# Patient Record
Sex: FEMALE | Race: WHITE | NOT HISPANIC OR LATINO
[De-identification: names, ages, dates, MRNs, and addresses within clinical notes are randomized per-mention and may not be internally consistent; named-entity substitution may affect disease eponyms.]

---

## 2017-01-20 ENCOUNTER — APPOINTMENT (OUTPATIENT)
Dept: INTERNAL MEDICINE | Facility: CLINIC | Age: 47
End: 2017-01-20

## 2017-01-20 ENCOUNTER — OUTPATIENT (OUTPATIENT)
Dept: OUTPATIENT SERVICES | Facility: HOSPITAL | Age: 47
LOS: 1 days | End: 2017-01-20
Payer: MEDICARE

## 2017-01-20 ENCOUNTER — RESULT CHARGE (OUTPATIENT)
Age: 47
End: 2017-01-20

## 2017-01-20 VITALS — HEART RATE: 62 BPM | DIASTOLIC BLOOD PRESSURE: 70 MMHG | SYSTOLIC BLOOD PRESSURE: 110 MMHG

## 2017-01-20 DIAGNOSIS — I10 ESSENTIAL (PRIMARY) HYPERTENSION: ICD-10-CM

## 2017-01-20 DIAGNOSIS — N39.0 URINARY TRACT INFECTION, SITE NOT SPECIFIED: ICD-10-CM

## 2017-01-20 PROCEDURE — G0463: CPT

## 2017-01-21 LAB
BILIRUB UR QL STRIP: NORMAL
CLARITY UR: CLEAR
COLLECTION METHOD: NORMAL
GLUCOSE UR-MCNC: NORMAL
HCG UR QL: 0.2 EU/DL
HGB UR QL STRIP.AUTO: NORMAL
KETONES UR-MCNC: NORMAL
LEUKOCYTE ESTERASE UR QL STRIP: NORMAL
NITRITE UR QL STRIP: NORMAL
PH UR STRIP: 5
PROT UR STRIP-MCNC: NORMAL
SP GR UR STRIP: 1.01

## 2017-01-22 LAB
APPEARANCE: CLEAR
BILIRUBIN URINE: NEGATIVE
BLOOD URINE: NEGATIVE
COLOR: YELLOW
GLUCOSE QUALITATIVE U: NORMAL MG/DL
KETONES URINE: NEGATIVE
LEUKOCYTE ESTERASE URINE: NEGATIVE
NITRITE URINE: NEGATIVE
PH URINE: 5.5
PROTEIN URINE: NEGATIVE MG/DL
SPECIFIC GRAVITY URINE: 1.02
UROBILINOGEN URINE: NORMAL MG/DL

## 2017-02-07 ENCOUNTER — APPOINTMENT (OUTPATIENT)
Dept: INFECTIOUS DISEASE | Facility: CLINIC | Age: 47
End: 2017-02-07

## 2017-02-07 VITALS
TEMPERATURE: 97 F | HEART RATE: 79 BPM | OXYGEN SATURATION: 98 % | DIASTOLIC BLOOD PRESSURE: 68 MMHG | HEIGHT: 59 IN | SYSTOLIC BLOOD PRESSURE: 103 MMHG

## 2017-03-28 ENCOUNTER — APPOINTMENT (OUTPATIENT)
Dept: ENDOCRINOLOGY | Facility: CLINIC | Age: 47
End: 2017-03-28

## 2017-03-28 VITALS
OXYGEN SATURATION: 99 % | DIASTOLIC BLOOD PRESSURE: 74 MMHG | SYSTOLIC BLOOD PRESSURE: 110 MMHG | HEIGHT: 59 IN | HEART RATE: 85 BPM

## 2017-03-31 ENCOUNTER — APPOINTMENT (OUTPATIENT)
Dept: INTERNAL MEDICINE | Facility: CLINIC | Age: 47
End: 2017-03-31

## 2017-03-31 LAB
25(OH)D3 SERPL-MCNC: 44 NG/ML
ALBUMIN SERPL ELPH-MCNC: 4.1 G/DL
ALP BLD-CCNC: 59 U/L
ALT SERPL-CCNC: 12 U/L
ANION GAP SERPL CALC-SCNC: 18 MMOL/L
AST SERPL-CCNC: 14 U/L
BASOPHILS # BLD AUTO: 0.03 K/UL
BASOPHILS NFR BLD AUTO: 0.5 %
BILIRUB SERPL-MCNC: 0.2 MG/DL
BUN SERPL-MCNC: 11 MG/DL
CALCIUM SERPL-MCNC: 9.3 MG/DL
CHLORIDE SERPL-SCNC: 101 MMOL/L
CHOLEST SERPL-MCNC: 215 MG/DL
CHOLEST/HDLC SERPL: 2.7 RATIO
CO2 SERPL-SCNC: 21 MMOL/L
CREAT SERPL-MCNC: 0.4 MG/DL
DHEA-S SERPL-MCNC: 77.9 UG/DL
EOSINOPHIL # BLD AUTO: 0.13 K/UL
EOSINOPHIL NFR BLD AUTO: 2.3 %
ESTRADIOL SERPL-MCNC: 76 PG/ML
FSH SERPL-MCNC: 8.5 IU/L
GLUCOSE SERPL-MCNC: 81 MG/DL
HCT VFR BLD CALC: 35.2 %
HDLC SERPL-MCNC: 79 MG/DL
HGB BLD-MCNC: 11.7 G/DL
IMM GRANULOCYTES NFR BLD AUTO: 0.2 %
LDLC SERPL CALC-MCNC: 126 MG/DL
LH SERPL-ACNC: 6.6 IU/L
LYMPHOCYTES # BLD AUTO: 1.83 K/UL
LYMPHOCYTES NFR BLD AUTO: 32.9 %
MAN DIFF?: NORMAL
MCHC RBC-ENTMCNC: 30 PG
MCHC RBC-ENTMCNC: 33.2 GM/DL
MCV RBC AUTO: 90.3 FL
MONOCYTES # BLD AUTO: 0.44 K/UL
MONOCYTES NFR BLD AUTO: 7.9 %
NEUTROPHILS # BLD AUTO: 3.13 K/UL
NEUTROPHILS NFR BLD AUTO: 56.2 %
PLATELET # BLD AUTO: 229 K/UL
POTASSIUM SERPL-SCNC: 4.2 MMOL/L
PROLACTIN SERPL-MCNC: 105 NG/ML
PROT SERPL-MCNC: 6.9 G/DL
RBC # BLD: 3.9 M/UL
RBC # FLD: 13.1 %
SODIUM SERPL-SCNC: 140 MMOL/L
T4 FREE SERPL-MCNC: 1.1 NG/DL
TESTOST SERPL-MCNC: 18.3 NG/DL
TRIGL SERPL-MCNC: 51 MG/DL
TSH SERPL-ACNC: 5.16 UIU/ML
WBC # FLD AUTO: 5.57 K/UL

## 2017-04-06 ENCOUNTER — APPOINTMENT (OUTPATIENT)
Dept: OTOLARYNGOLOGY | Facility: CLINIC | Age: 47
End: 2017-04-06

## 2017-04-26 ENCOUNTER — FORM ENCOUNTER (OUTPATIENT)
Age: 47
End: 2017-04-26

## 2017-04-27 ENCOUNTER — OUTPATIENT (OUTPATIENT)
Dept: OUTPATIENT SERVICES | Facility: HOSPITAL | Age: 47
LOS: 1 days | End: 2017-04-27
Payer: MEDICARE

## 2017-04-27 ENCOUNTER — APPOINTMENT (OUTPATIENT)
Dept: ULTRASOUND IMAGING | Facility: IMAGING CENTER | Age: 47
End: 2017-04-27

## 2017-04-27 ENCOUNTER — APPOINTMENT (OUTPATIENT)
Dept: MAMMOGRAPHY | Facility: IMAGING CENTER | Age: 47
End: 2017-04-27

## 2017-04-27 DIAGNOSIS — Z00.8 ENCOUNTER FOR OTHER GENERAL EXAMINATION: ICD-10-CM

## 2017-04-27 PROCEDURE — 77067 SCR MAMMO BI INCL CAD: CPT

## 2017-04-27 PROCEDURE — 76641 ULTRASOUND BREAST COMPLETE: CPT

## 2017-04-27 PROCEDURE — 77063 BREAST TOMOSYNTHESIS BI: CPT

## 2017-04-28 ENCOUNTER — APPOINTMENT (OUTPATIENT)
Dept: ULTRASOUND IMAGING | Facility: IMAGING CENTER | Age: 47
End: 2017-04-28

## 2017-04-28 ENCOUNTER — OUTPATIENT (OUTPATIENT)
Dept: OUTPATIENT SERVICES | Facility: HOSPITAL | Age: 47
LOS: 1 days | End: 2017-04-28
Payer: MEDICARE

## 2017-04-28 DIAGNOSIS — Z00.8 ENCOUNTER FOR OTHER GENERAL EXAMINATION: ICD-10-CM

## 2017-04-28 PROCEDURE — 76856 US EXAM PELVIC COMPLETE: CPT

## 2017-05-02 ENCOUNTER — APPOINTMENT (OUTPATIENT)
Dept: OBGYN | Facility: CLINIC | Age: 47
End: 2017-05-02

## 2017-05-09 ENCOUNTER — CLINICAL ADVICE (OUTPATIENT)
Age: 47
End: 2017-05-09

## 2017-05-11 ENCOUNTER — APPOINTMENT (OUTPATIENT)
Dept: OTOLARYNGOLOGY | Facility: CLINIC | Age: 47
End: 2017-05-11

## 2017-05-12 ENCOUNTER — OTHER (OUTPATIENT)
Age: 47
End: 2017-05-12

## 2017-05-12 ENCOUNTER — APPOINTMENT (OUTPATIENT)
Dept: SURGICAL ONCOLOGY | Facility: CLINIC | Age: 47
End: 2017-05-12

## 2017-05-12 VITALS
WEIGHT: 115 LBS | BODY MASS INDEX: 23.18 KG/M2 | HEART RATE: 87 BPM | DIASTOLIC BLOOD PRESSURE: 62 MMHG | RESPIRATION RATE: 15 BRPM | SYSTOLIC BLOOD PRESSURE: 96 MMHG | HEIGHT: 59 IN

## 2017-05-15 LAB
APPEARANCE: CLEAR
BACTERIA UR CULT: NORMAL
BACTERIA: NEGATIVE
BILIRUBIN URINE: NEGATIVE
BLOOD URINE: NEGATIVE
COLOR: YELLOW
GLUCOSE QUALITATIVE U: NORMAL MG/DL
HYALINE CASTS: 0 /LPF
KETONES URINE: NEGATIVE
LEUKOCYTE ESTERASE URINE: NEGATIVE
MICROSCOPIC-UA: NORMAL
NITRITE URINE: NEGATIVE
PH URINE: 6.5
PROTEIN URINE: NEGATIVE MG/DL
RED BLOOD CELLS URINE: 0 /HPF
SPECIFIC GRAVITY URINE: 1.01
SQUAMOUS EPITHELIAL CELLS: 0 /HPF
UROBILINOGEN URINE: NORMAL MG/DL
WHITE BLOOD CELLS URINE: 0 /HPF

## 2017-05-30 ENCOUNTER — APPOINTMENT (OUTPATIENT)
Dept: OPHTHALMOLOGY | Facility: CLINIC | Age: 47
End: 2017-05-30

## 2017-06-06 ENCOUNTER — APPOINTMENT (OUTPATIENT)
Dept: NEUROLOGY | Facility: CLINIC | Age: 47
End: 2017-06-06

## 2017-06-06 VITALS
SYSTOLIC BLOOD PRESSURE: 107 MMHG | HEIGHT: 59 IN | HEART RATE: 99 BPM | WEIGHT: 115 LBS | DIASTOLIC BLOOD PRESSURE: 74 MMHG | BODY MASS INDEX: 23.18 KG/M2

## 2017-06-09 ENCOUNTER — APPOINTMENT (OUTPATIENT)
Dept: INTERNAL MEDICINE | Facility: CLINIC | Age: 47
End: 2017-06-09

## 2017-06-16 ENCOUNTER — OTHER (OUTPATIENT)
Age: 47
End: 2017-06-16

## 2017-06-27 ENCOUNTER — APPOINTMENT (OUTPATIENT)
Dept: OTOLARYNGOLOGY | Facility: CLINIC | Age: 47
End: 2017-06-27

## 2017-06-27 VITALS
DIASTOLIC BLOOD PRESSURE: 68 MMHG | HEIGHT: 59 IN | BODY MASS INDEX: 23.18 KG/M2 | SYSTOLIC BLOOD PRESSURE: 103 MMHG | WEIGHT: 115 LBS | HEART RATE: 77 BPM

## 2017-07-14 ENCOUNTER — APPOINTMENT (OUTPATIENT)
Dept: INTERNAL MEDICINE | Facility: CLINIC | Age: 47
End: 2017-07-14

## 2017-09-01 ENCOUNTER — APPOINTMENT (OUTPATIENT)
Dept: INFECTIOUS DISEASE | Facility: CLINIC | Age: 47
End: 2017-09-01

## 2017-09-05 LAB
APPEARANCE: CLEAR
BACTERIA UR CULT: NORMAL
BACTERIA: NEGATIVE
BILIRUBIN URINE: NEGATIVE
BLOOD URINE: NEGATIVE
COLOR: YELLOW
GLUCOSE QUALITATIVE U: NORMAL MG/DL
KETONES URINE: NEGATIVE
LEUKOCYTE ESTERASE URINE: NEGATIVE
MICROSCOPIC-UA: NORMAL
NITRITE URINE: NEGATIVE
PH URINE: 6.5
PROTEIN URINE: NEGATIVE MG/DL
RED BLOOD CELLS URINE: 0 /HPF
SPECIFIC GRAVITY URINE: 1.01
SQUAMOUS EPITHELIAL CELLS: 0 /HPF
UROBILINOGEN URINE: NORMAL MG/DL
WHITE BLOOD CELLS URINE: 0 /HPF

## 2017-09-21 ENCOUNTER — APPOINTMENT (OUTPATIENT)
Dept: UROLOGY | Facility: CLINIC | Age: 47
End: 2017-09-21
Payer: MEDICARE

## 2017-09-21 PROCEDURE — 99213 OFFICE O/P EST LOW 20 MIN: CPT

## 2017-09-21 RX ORDER — CEFPODOXIME PROXETIL 100 MG/1
100 TABLET, FILM COATED ORAL
Qty: 56 | Refills: 0 | Status: COMPLETED | COMMUNITY
Start: 2017-08-08 | End: 2017-09-21

## 2017-09-22 ENCOUNTER — LABORATORY RESULT (OUTPATIENT)
Age: 47
End: 2017-09-22

## 2017-09-22 ENCOUNTER — APPOINTMENT (OUTPATIENT)
Dept: INTERNAL MEDICINE | Facility: CLINIC | Age: 47
End: 2017-09-22
Payer: MEDICARE

## 2017-09-22 ENCOUNTER — OUTPATIENT (OUTPATIENT)
Dept: OUTPATIENT SERVICES | Facility: HOSPITAL | Age: 47
LOS: 1 days | End: 2017-09-22
Payer: MEDICARE

## 2017-09-22 VITALS
HEIGHT: 59 IN | BODY MASS INDEX: 23.18 KG/M2 | WEIGHT: 115 LBS | SYSTOLIC BLOOD PRESSURE: 110 MMHG | DIASTOLIC BLOOD PRESSURE: 80 MMHG

## 2017-09-22 DIAGNOSIS — I10 ESSENTIAL (PRIMARY) HYPERTENSION: ICD-10-CM

## 2017-09-22 DIAGNOSIS — N31.9 NEUROMUSCULAR DYSFUNCTION OF BLADDER, UNSPECIFIED: ICD-10-CM

## 2017-09-22 DIAGNOSIS — G80.9 CEREBRAL PALSY, UNSPECIFIED: ICD-10-CM

## 2017-09-22 DIAGNOSIS — E03.9 HYPOTHYROIDISM, UNSPECIFIED: ICD-10-CM

## 2017-09-22 DIAGNOSIS — E22.1 HYPERPROLACTINEMIA: ICD-10-CM

## 2017-09-22 LAB
BILIRUB UR QL STRIP: NORMAL
CLARITY UR: CLEAR
COLLECTION METHOD: NORMAL
GLUCOSE UR-MCNC: NORMAL
HCG UR QL: 0.2 EU/DL
HGB UR QL STRIP.AUTO: NORMAL
KETONES UR-MCNC: NORMAL
LEUKOCYTE ESTERASE UR QL STRIP: NORMAL
NITRITE UR QL STRIP: NORMAL
PH UR STRIP: 5.5
PROT UR STRIP-MCNC: NORMAL
SP GR UR STRIP: 1.01

## 2017-09-22 PROCEDURE — 99213 OFFICE O/P EST LOW 20 MIN: CPT | Mod: GE

## 2017-09-22 PROCEDURE — G0463: CPT

## 2017-09-22 PROCEDURE — 85027 COMPLETE CBC AUTOMATED: CPT

## 2017-09-22 PROCEDURE — 84443 ASSAY THYROID STIM HORMONE: CPT

## 2017-09-22 PROCEDURE — 80053 COMPREHEN METABOLIC PANEL: CPT

## 2017-09-22 PROCEDURE — 84439 ASSAY OF FREE THYROXINE: CPT

## 2017-09-22 PROCEDURE — 84146 ASSAY OF PROLACTIN: CPT

## 2017-09-22 RX ORDER — OXICONAZOLE NITRATE 10 MG/ML
1 LOTION TOPICAL
Qty: 60 | Refills: 0 | Status: DISCONTINUED | COMMUNITY
Start: 2017-02-13 | End: 2017-09-22

## 2017-09-23 LAB
ALBUMIN SERPL ELPH-MCNC: 4.1 G/DL — SIGNIFICANT CHANGE UP (ref 3.3–5)
ALP SERPL-CCNC: 55 U/L — SIGNIFICANT CHANGE UP (ref 40–120)
ALT FLD-CCNC: 13 U/L — SIGNIFICANT CHANGE UP (ref 10–45)
ANION GAP SERPL CALC-SCNC: 14 MMOL/L — SIGNIFICANT CHANGE UP (ref 5–17)
AST SERPL-CCNC: 13 U/L — SIGNIFICANT CHANGE UP (ref 10–40)
BILIRUB SERPL-MCNC: 0.3 MG/DL — SIGNIFICANT CHANGE UP (ref 0.2–1.2)
BUN SERPL-MCNC: 13 MG/DL — SIGNIFICANT CHANGE UP (ref 7–23)
CALCIUM SERPL-MCNC: 9 MG/DL — SIGNIFICANT CHANGE UP (ref 8.4–10.5)
CHLORIDE SERPL-SCNC: 103 MMOL/L — SIGNIFICANT CHANGE UP (ref 96–108)
CO2 SERPL-SCNC: 23 MMOL/L — SIGNIFICANT CHANGE UP (ref 22–31)
CREAT SERPL-MCNC: 0.36 MG/DL — LOW (ref 0.5–1.3)
GLUCOSE SERPL-MCNC: 80 MG/DL — SIGNIFICANT CHANGE UP (ref 70–99)
HCT VFR BLD CALC: 34.7 % — SIGNIFICANT CHANGE UP (ref 34.5–45)
HGB BLD-MCNC: 11.2 G/DL — LOW (ref 11.5–15.5)
MCHC RBC-ENTMCNC: 29.6 PG — SIGNIFICANT CHANGE UP (ref 27–34)
MCHC RBC-ENTMCNC: 32.3 GM/DL — SIGNIFICANT CHANGE UP (ref 32–36)
MCV RBC AUTO: 91.8 FL — SIGNIFICANT CHANGE UP (ref 80–100)
PLATELET # BLD AUTO: 221 K/UL — SIGNIFICANT CHANGE UP (ref 150–400)
POTASSIUM SERPL-MCNC: 4.1 MMOL/L — SIGNIFICANT CHANGE UP (ref 3.5–5.3)
POTASSIUM SERPL-SCNC: 4.1 MMOL/L — SIGNIFICANT CHANGE UP (ref 3.5–5.3)
PROLACTIN SERPL-MCNC: 109.2 NG/ML — HIGH (ref 3.4–24.1)
PROT SERPL-MCNC: 7 G/DL — SIGNIFICANT CHANGE UP (ref 6–8.3)
RBC # BLD: 3.78 M/UL — LOW (ref 3.8–5.2)
RBC # FLD: 14.6 % — HIGH (ref 10.3–14.5)
SODIUM SERPL-SCNC: 140 MMOL/L — SIGNIFICANT CHANGE UP (ref 135–145)
T4 FREE SERPL-MCNC: 1.1 NG/DL — SIGNIFICANT CHANGE UP (ref 0.9–1.8)
TSH SERPL-MCNC: 3.55 UIU/ML — SIGNIFICANT CHANGE UP (ref 0.27–4.2)
WBC # BLD: 5.06 K/UL — SIGNIFICANT CHANGE UP (ref 3.8–10.5)
WBC # FLD AUTO: 5.06 K/UL — SIGNIFICANT CHANGE UP (ref 3.8–10.5)

## 2017-09-26 ENCOUNTER — APPOINTMENT (OUTPATIENT)
Dept: INFECTIOUS DISEASE | Facility: CLINIC | Age: 47
End: 2017-09-26
Payer: MEDICARE

## 2017-09-26 VITALS
HEIGHT: 55 IN | DIASTOLIC BLOOD PRESSURE: 68 MMHG | HEART RATE: 82 BPM | OXYGEN SATURATION: 98 % | SYSTOLIC BLOOD PRESSURE: 104 MMHG | TEMPERATURE: 99.5 F

## 2017-09-26 LAB — BACTERIA UR CULT: NORMAL

## 2017-09-26 PROCEDURE — 99213 OFFICE O/P EST LOW 20 MIN: CPT

## 2017-09-26 RX ORDER — NYSTATIN AND TRIAMCINOLONE ACETONIDE 100000; 1 MG/G; MG/G
100000-0.1 CREAM TOPICAL DAILY
Qty: 1 | Refills: 0 | Status: DISCONTINUED | COMMUNITY
Start: 2017-08-08 | End: 2017-09-26

## 2017-09-27 ENCOUNTER — APPOINTMENT (OUTPATIENT)
Dept: OBGYN | Facility: CLINIC | Age: 47
End: 2017-09-27

## 2017-09-29 ENCOUNTER — APPOINTMENT (OUTPATIENT)
Dept: ULTRASOUND IMAGING | Facility: IMAGING CENTER | Age: 47
End: 2017-09-29
Payer: MEDICARE

## 2017-10-03 ENCOUNTER — APPOINTMENT (OUTPATIENT)
Dept: ENDOCRINOLOGY | Facility: CLINIC | Age: 47
End: 2017-10-03

## 2017-10-11 ENCOUNTER — FORM ENCOUNTER (OUTPATIENT)
Age: 47
End: 2017-10-11

## 2017-10-12 ENCOUNTER — APPOINTMENT (OUTPATIENT)
Dept: ULTRASOUND IMAGING | Facility: IMAGING CENTER | Age: 47
End: 2017-10-12
Payer: MEDICARE

## 2017-10-12 ENCOUNTER — OUTPATIENT (OUTPATIENT)
Dept: OUTPATIENT SERVICES | Facility: HOSPITAL | Age: 47
LOS: 1 days | End: 2017-10-12
Payer: MEDICARE

## 2017-10-12 DIAGNOSIS — Z00.8 ENCOUNTER FOR OTHER GENERAL EXAMINATION: ICD-10-CM

## 2017-10-12 PROCEDURE — 76770 US EXAM ABDO BACK WALL COMP: CPT | Mod: 26

## 2017-10-12 PROCEDURE — 76770 US EXAM ABDO BACK WALL COMP: CPT

## 2017-10-17 ENCOUNTER — APPOINTMENT (OUTPATIENT)
Dept: PHYSICAL MEDICINE AND REHAB | Facility: CLINIC | Age: 47
End: 2017-10-17
Payer: MEDICARE

## 2017-10-17 PROCEDURE — 99213 OFFICE O/P EST LOW 20 MIN: CPT

## 2017-10-17 RX ORDER — CEFPODOXIME PROXETIL 100 MG/1
100 TABLET, FILM COATED ORAL
Qty: 56 | Refills: 0 | Status: DISCONTINUED | COMMUNITY
Start: 2017-09-26 | End: 2017-10-17

## 2017-10-18 ENCOUNTER — TRANSCRIPTION ENCOUNTER (OUTPATIENT)
Age: 47
End: 2017-10-18

## 2017-11-21 ENCOUNTER — APPOINTMENT (OUTPATIENT)
Dept: ENDOCRINOLOGY | Facility: CLINIC | Age: 47
End: 2017-11-21
Payer: MEDICARE

## 2017-11-21 VITALS — SYSTOLIC BLOOD PRESSURE: 100 MMHG | OXYGEN SATURATION: 98 % | HEART RATE: 84 BPM | DIASTOLIC BLOOD PRESSURE: 70 MMHG

## 2017-11-21 VITALS — BODY MASS INDEX: 21.17 KG/M2 | HEIGHT: 59 IN | WEIGHT: 105 LBS

## 2017-11-21 PROCEDURE — G0008: CPT

## 2017-11-21 PROCEDURE — 77080 DXA BONE DENSITY AXIAL: CPT | Mod: GA

## 2017-11-21 PROCEDURE — ZZZZZ: CPT

## 2017-11-21 PROCEDURE — 99214 OFFICE O/P EST MOD 30 MIN: CPT | Mod: 25

## 2017-11-21 PROCEDURE — 90686 IIV4 VACC NO PRSV 0.5 ML IM: CPT

## 2017-11-28 ENCOUNTER — FORM ENCOUNTER (OUTPATIENT)
Age: 47
End: 2017-11-28

## 2017-11-29 ENCOUNTER — APPOINTMENT (OUTPATIENT)
Dept: MRI IMAGING | Facility: IMAGING CENTER | Age: 47
End: 2017-11-29
Payer: MEDICARE

## 2017-11-29 ENCOUNTER — OUTPATIENT (OUTPATIENT)
Dept: OUTPATIENT SERVICES | Facility: HOSPITAL | Age: 47
LOS: 1 days | End: 2017-11-29
Payer: MEDICARE

## 2017-11-29 DIAGNOSIS — M47.12 OTHER SPONDYLOSIS WITH MYELOPATHY, CERVICAL REGION: ICD-10-CM

## 2017-11-29 DIAGNOSIS — M47.22 OTHER SPONDYLOSIS WITH RADICULOPATHY, CERVICAL REGION: ICD-10-CM

## 2017-11-29 PROCEDURE — 72141 MRI NECK SPINE W/O DYE: CPT | Mod: 26

## 2017-11-29 PROCEDURE — 72141 MRI NECK SPINE W/O DYE: CPT

## 2017-12-19 ENCOUNTER — APPOINTMENT (OUTPATIENT)
Dept: OBGYN | Facility: CLINIC | Age: 47
End: 2017-12-19

## 2017-12-21 ENCOUNTER — APPOINTMENT (OUTPATIENT)
Dept: OTOLARYNGOLOGY | Facility: CLINIC | Age: 47
End: 2017-12-21
Payer: MEDICARE

## 2017-12-21 VITALS
WEIGHT: 105 LBS | HEIGHT: 59 IN | HEART RATE: 71 BPM | SYSTOLIC BLOOD PRESSURE: 98 MMHG | DIASTOLIC BLOOD PRESSURE: 66 MMHG | BODY MASS INDEX: 21.17 KG/M2

## 2017-12-21 PROCEDURE — 99213 OFFICE O/P EST LOW 20 MIN: CPT | Mod: 25

## 2017-12-21 PROCEDURE — 69210 REMOVE IMPACTED EAR WAX UNI: CPT

## 2018-03-06 ENCOUNTER — OUTPATIENT (OUTPATIENT)
Dept: OUTPATIENT SERVICES | Facility: HOSPITAL | Age: 48
LOS: 1 days | End: 2018-03-06
Payer: MEDICARE

## 2018-03-06 ENCOUNTER — APPOINTMENT (OUTPATIENT)
Dept: ULTRASOUND IMAGING | Facility: IMAGING CENTER | Age: 48
End: 2018-03-06
Payer: MEDICARE

## 2018-03-06 DIAGNOSIS — N92.1 EXCESSIVE AND FREQUENT MENSTRUATION WITH IRREGULAR CYCLE: ICD-10-CM

## 2018-03-06 PROCEDURE — 76830 TRANSVAGINAL US NON-OB: CPT | Mod: 26

## 2018-03-06 PROCEDURE — 76856 US EXAM PELVIC COMPLETE: CPT | Mod: 26

## 2018-03-06 PROCEDURE — 76830 TRANSVAGINAL US NON-OB: CPT

## 2018-03-06 PROCEDURE — 76856 US EXAM PELVIC COMPLETE: CPT

## 2018-03-13 ENCOUNTER — RESULT REVIEW (OUTPATIENT)
Age: 48
End: 2018-03-13

## 2018-03-14 ENCOUNTER — APPOINTMENT (OUTPATIENT)
Dept: OBGYN | Facility: CLINIC | Age: 48
End: 2018-03-14
Payer: MEDICARE

## 2018-03-14 PROCEDURE — 36415 COLL VENOUS BLD VENIPUNCTURE: CPT

## 2018-03-14 PROCEDURE — G0101: CPT

## 2018-04-12 ENCOUNTER — APPOINTMENT (OUTPATIENT)
Dept: OTOLARYNGOLOGY | Facility: CLINIC | Age: 48
End: 2018-04-12
Payer: MEDICARE

## 2018-04-12 VITALS
HEART RATE: 91 BPM | SYSTOLIC BLOOD PRESSURE: 96 MMHG | WEIGHT: 105 LBS | BODY MASS INDEX: 21.17 KG/M2 | DIASTOLIC BLOOD PRESSURE: 62 MMHG | HEIGHT: 59 IN

## 2018-04-12 PROCEDURE — 99213 OFFICE O/P EST LOW 20 MIN: CPT | Mod: 25

## 2018-04-12 PROCEDURE — 69210 REMOVE IMPACTED EAR WAX UNI: CPT

## 2018-04-18 ENCOUNTER — APPOINTMENT (OUTPATIENT)
Dept: INFECTIOUS DISEASE | Facility: CLINIC | Age: 48
End: 2018-04-18
Payer: MEDICARE

## 2018-04-18 ENCOUNTER — LABORATORY RESULT (OUTPATIENT)
Age: 48
End: 2018-04-18

## 2018-04-18 VITALS
RESPIRATION RATE: 16 BRPM | WEIGHT: 100 LBS | TEMPERATURE: 98.1 F | DIASTOLIC BLOOD PRESSURE: 61 MMHG | SYSTOLIC BLOOD PRESSURE: 104 MMHG | OXYGEN SATURATION: 97 % | HEIGHT: 59 IN | HEART RATE: 75 BPM | BODY MASS INDEX: 20.16 KG/M2

## 2018-04-18 PROCEDURE — 99214 OFFICE O/P EST MOD 30 MIN: CPT

## 2018-04-19 ENCOUNTER — APPOINTMENT (OUTPATIENT)
Dept: UROLOGY | Facility: CLINIC | Age: 48
End: 2018-04-19
Payer: MEDICARE

## 2018-04-19 PROCEDURE — 99213 OFFICE O/P EST LOW 20 MIN: CPT

## 2018-04-24 ENCOUNTER — APPOINTMENT (OUTPATIENT)
Dept: ENDOCRINOLOGY | Facility: CLINIC | Age: 48
End: 2018-04-24
Payer: MEDICARE

## 2018-04-24 VITALS
WEIGHT: 100 LBS | HEART RATE: 83 BPM | OXYGEN SATURATION: 96 % | BODY MASS INDEX: 20.16 KG/M2 | HEIGHT: 59 IN | DIASTOLIC BLOOD PRESSURE: 70 MMHG | SYSTOLIC BLOOD PRESSURE: 110 MMHG

## 2018-04-24 DIAGNOSIS — L65.9 NONSCARRING HAIR LOSS, UNSPECIFIED: ICD-10-CM

## 2018-04-24 PROCEDURE — 99214 OFFICE O/P EST MOD 30 MIN: CPT

## 2018-04-25 LAB
25(OH)D3 SERPL-MCNC: 53.5 NG/ML
ALBUMIN SERPL ELPH-MCNC: 4 G/DL
ALP BLD-CCNC: 60 U/L
ALT SERPL-CCNC: 13 U/L
ANION GAP SERPL CALC-SCNC: 12 MMOL/L
AST SERPL-CCNC: 14 U/L
BASOPHILS # BLD AUTO: 0.03 K/UL
BASOPHILS NFR BLD AUTO: 0.6 %
BILIRUB SERPL-MCNC: 0.2 MG/DL
BUN SERPL-MCNC: 10 MG/DL
CALCIUM SERPL-MCNC: 9.2 MG/DL
CHLORIDE SERPL-SCNC: 100 MMOL/L
CHOLEST SERPL-MCNC: 190 MG/DL
CHOLEST/HDLC SERPL: 2.1 RATIO
CO2 SERPL-SCNC: 24 MMOL/L
CREAT SERPL-MCNC: 0.39 MG/DL
EOSINOPHIL # BLD AUTO: 0.13 K/UL
EOSINOPHIL NFR BLD AUTO: 2.4 %
ESTRADIOL SERPL-MCNC: 53 PG/ML
FERRITIN SERPL-MCNC: 18 NG/ML
FSH SERPL-MCNC: 21.8 IU/L
GLUCOSE SERPL-MCNC: 90 MG/DL
HCT VFR BLD CALC: 35.5 %
HDLC SERPL-MCNC: 89 MG/DL
HGB BLD-MCNC: 11.2 G/DL
IGF-1 INTERP: NORMAL
IGF-I BLD-MCNC: 166 NG/ML
IMM GRANULOCYTES NFR BLD AUTO: 0 %
IRON SATN MFR SERPL: 44 %
IRON SERPL-MCNC: 147 UG/DL
LDLC SERPL CALC-MCNC: 92 MG/DL
LH SERPL-ACNC: 11.3 IU/L
LYMPHOCYTES # BLD AUTO: 1.84 K/UL
LYMPHOCYTES NFR BLD AUTO: 34.7 %
MAN DIFF?: NORMAL
MCHC RBC-ENTMCNC: 30.3 PG
MCHC RBC-ENTMCNC: 31.5 GM/DL
MCV RBC AUTO: 95.9 FL
MONOCYTES # BLD AUTO: 0.45 K/UL
MONOCYTES NFR BLD AUTO: 8.5 %
NEUTROPHILS # BLD AUTO: 2.86 K/UL
NEUTROPHILS NFR BLD AUTO: 53.8 %
PLATELET # BLD AUTO: 273 K/UL
POTASSIUM SERPL-SCNC: 4.3 MMOL/L
PROLACTIN SERPL-MCNC: 93.2 NG/ML
PROT SERPL-MCNC: 6.8 G/DL
RBC # BLD: 3.7 M/UL
RBC # FLD: 13.4 %
SODIUM SERPL-SCNC: 136 MMOL/L
T4 FREE SERPL-MCNC: 1.2 NG/DL
TIBC SERPL-MCNC: 337 UG/DL
TRIGL SERPL-MCNC: 44 MG/DL
TSH SERPL-ACNC: 5.38 UIU/ML
UIBC SERPL-MCNC: 190 UG/DL
WBC # FLD AUTO: 5.31 K/UL

## 2018-05-07 ENCOUNTER — FORM ENCOUNTER (OUTPATIENT)
Age: 48
End: 2018-05-07

## 2018-05-08 ENCOUNTER — APPOINTMENT (OUTPATIENT)
Dept: ULTRASOUND IMAGING | Facility: IMAGING CENTER | Age: 48
End: 2018-05-08
Payer: MEDICARE

## 2018-05-08 ENCOUNTER — OUTPATIENT (OUTPATIENT)
Dept: OUTPATIENT SERVICES | Facility: HOSPITAL | Age: 48
LOS: 1 days | End: 2018-05-08
Payer: MEDICARE

## 2018-05-08 ENCOUNTER — APPOINTMENT (OUTPATIENT)
Dept: MAMMOGRAPHY | Facility: IMAGING CENTER | Age: 48
End: 2018-05-08
Payer: MEDICARE

## 2018-05-08 DIAGNOSIS — Z00.8 ENCOUNTER FOR OTHER GENERAL EXAMINATION: ICD-10-CM

## 2018-05-08 PROCEDURE — 77066 DX MAMMO INCL CAD BI: CPT

## 2018-05-08 PROCEDURE — 77063 BREAST TOMOSYNTHESIS BI: CPT | Mod: 26

## 2018-05-08 PROCEDURE — 77067 SCR MAMMO BI INCL CAD: CPT

## 2018-05-08 PROCEDURE — G0279: CPT

## 2018-05-08 PROCEDURE — 77067 SCR MAMMO BI INCL CAD: CPT | Mod: 26

## 2018-05-08 PROCEDURE — 76641 ULTRASOUND BREAST COMPLETE: CPT

## 2018-05-08 PROCEDURE — 77063 BREAST TOMOSYNTHESIS BI: CPT

## 2018-05-09 ENCOUNTER — APPOINTMENT (OUTPATIENT)
Dept: SURGICAL ONCOLOGY | Facility: CLINIC | Age: 48
End: 2018-05-09
Payer: MEDICARE

## 2018-05-09 VITALS
HEIGHT: 59 IN | RESPIRATION RATE: 16 BRPM | SYSTOLIC BLOOD PRESSURE: 104 MMHG | OXYGEN SATURATION: 95 % | HEART RATE: 64 BPM | DIASTOLIC BLOOD PRESSURE: 68 MMHG

## 2018-05-09 PROCEDURE — 99214 OFFICE O/P EST MOD 30 MIN: CPT

## 2018-05-09 PROCEDURE — 76641 ULTRASOUND BREAST COMPLETE: CPT | Mod: 26,50

## 2018-05-25 ENCOUNTER — OUTPATIENT (OUTPATIENT)
Dept: OUTPATIENT SERVICES | Facility: HOSPITAL | Age: 48
LOS: 1 days | End: 2018-05-25
Payer: MEDICARE

## 2018-05-25 ENCOUNTER — APPOINTMENT (OUTPATIENT)
Dept: ULTRASOUND IMAGING | Facility: IMAGING CENTER | Age: 48
End: 2018-05-25
Payer: MEDICARE

## 2018-05-25 DIAGNOSIS — Z00.8 ENCOUNTER FOR OTHER GENERAL EXAMINATION: ICD-10-CM

## 2018-05-25 PROCEDURE — 76856 US EXAM PELVIC COMPLETE: CPT | Mod: 26

## 2018-05-25 PROCEDURE — 76830 TRANSVAGINAL US NON-OB: CPT | Mod: 26

## 2018-05-25 PROCEDURE — 76830 TRANSVAGINAL US NON-OB: CPT

## 2018-05-25 PROCEDURE — 76856 US EXAM PELVIC COMPLETE: CPT

## 2018-05-29 ENCOUNTER — APPOINTMENT (OUTPATIENT)
Dept: OBGYN | Facility: CLINIC | Age: 48
End: 2018-05-29
Payer: MEDICARE

## 2018-05-29 PROCEDURE — 99213 OFFICE O/P EST LOW 20 MIN: CPT

## 2018-06-05 ENCOUNTER — APPOINTMENT (OUTPATIENT)
Dept: INFECTIOUS DISEASE | Facility: CLINIC | Age: 48
End: 2018-06-05

## 2018-06-12 LAB — BACTERIA UR CULT: NORMAL

## 2018-06-28 ENCOUNTER — APPOINTMENT (OUTPATIENT)
Dept: UROLOGY | Facility: CLINIC | Age: 48
End: 2018-06-28
Payer: MEDICARE

## 2018-06-28 VITALS
DIASTOLIC BLOOD PRESSURE: 64 MMHG | HEIGHT: 59 IN | SYSTOLIC BLOOD PRESSURE: 104 MMHG | WEIGHT: 105 LBS | BODY MASS INDEX: 21.17 KG/M2

## 2018-06-28 PROCEDURE — 99215 OFFICE O/P EST HI 40 MIN: CPT

## 2018-07-11 LAB
BASOPHILS # BLD AUTO: 0.04 K/UL
BASOPHILS NFR BLD AUTO: 0.9 %
EOSINOPHIL # BLD AUTO: 0.12 K/UL
EOSINOPHIL NFR BLD AUTO: 2.7 %
HCT VFR BLD CALC: 33.9 %
HGB BLD-MCNC: 10.8 G/DL
IMM GRANULOCYTES NFR BLD AUTO: 0 %
LYMPHOCYTES # BLD AUTO: 2.11 K/UL
LYMPHOCYTES NFR BLD AUTO: 48.1 %
MAN DIFF?: NORMAL
MCHC RBC-ENTMCNC: 29 PG
MCHC RBC-ENTMCNC: 31.9 GM/DL
MCV RBC AUTO: 91.1 FL
MONOCYTES # BLD AUTO: 0.43 K/UL
MONOCYTES NFR BLD AUTO: 9.8 %
NEUTROPHILS # BLD AUTO: 1.69 K/UL
NEUTROPHILS NFR BLD AUTO: 38.5 %
PLATELET # BLD AUTO: 237 K/UL
RBC # BLD: 3.72 M/UL
RBC # FLD: 13.5 %
WBC # FLD AUTO: 4.39 K/UL

## 2018-07-13 ENCOUNTER — APPOINTMENT (OUTPATIENT)
Dept: INTERNAL MEDICINE | Facility: CLINIC | Age: 48
End: 2018-07-13

## 2018-07-24 ENCOUNTER — OUTPATIENT (OUTPATIENT)
Dept: OUTPATIENT SERVICES | Facility: HOSPITAL | Age: 48
LOS: 1 days | End: 2018-07-24
Payer: MEDICARE

## 2018-07-24 ENCOUNTER — APPOINTMENT (OUTPATIENT)
Dept: INTERNAL MEDICINE | Facility: CLINIC | Age: 48
End: 2018-07-24
Payer: MEDICARE

## 2018-07-24 VITALS
HEART RATE: 80 BPM | TEMPERATURE: 99.2 F | HEIGHT: 59 IN | DIASTOLIC BLOOD PRESSURE: 60 MMHG | SYSTOLIC BLOOD PRESSURE: 100 MMHG

## 2018-07-24 DIAGNOSIS — M25.559 PAIN IN UNSPECIFIED HIP: ICD-10-CM

## 2018-07-24 DIAGNOSIS — I10 ESSENTIAL (PRIMARY) HYPERTENSION: ICD-10-CM

## 2018-07-24 DIAGNOSIS — R55 SYNCOPE AND COLLAPSE: ICD-10-CM

## 2018-07-24 DIAGNOSIS — Z91.018 ALLERGY TO OTHER FOODS: ICD-10-CM

## 2018-07-24 DIAGNOSIS — H10.13 ACUTE ATOPIC CONJUNCTIVITIS, BILATERAL: ICD-10-CM

## 2018-07-24 DIAGNOSIS — Z87.09 PERSONAL HISTORY OF OTHER DISEASES OF THE RESPIRATORY SYSTEM: ICD-10-CM

## 2018-07-24 DIAGNOSIS — H91.93 UNSPECIFIED HEARING LOSS, BILATERAL: ICD-10-CM

## 2018-07-24 DIAGNOSIS — Z11.3 ENCOUNTER FOR SCREENING FOR INFECTIONS WITH A PREDOMINANTLY SEXUAL MODE OF TRANSMISSION: ICD-10-CM

## 2018-07-24 DIAGNOSIS — T50.905A ADVERSE EFFECT OF UNSPECIFIED DRUGS, MEDICAMENTS AND BIOLOGICAL SUBSTANCES, INITIAL ENCOUNTER: ICD-10-CM

## 2018-07-24 DIAGNOSIS — Z87.81 PERSONAL HISTORY OF (HEALED) TRAUMATIC FRACTURE: ICD-10-CM

## 2018-07-24 DIAGNOSIS — M24.50 CONTRACTURE, UNSPECIFIED JOINT: ICD-10-CM

## 2018-07-24 DIAGNOSIS — M77.8 OTHER ENTHESOPATHIES, NOT ELSEWHERE CLASSIFIED: ICD-10-CM

## 2018-07-24 DIAGNOSIS — Z87.898 PERSONAL HISTORY OF OTHER SPECIFIED CONDITIONS: ICD-10-CM

## 2018-07-24 DIAGNOSIS — Z87.440 PERSONAL HISTORY OF URINARY (TRACT) INFECTIONS: ICD-10-CM

## 2018-07-24 DIAGNOSIS — N84.1 POLYP OF CERVIX UTERI: ICD-10-CM

## 2018-07-24 DIAGNOSIS — R07.0 PAIN IN THROAT: ICD-10-CM

## 2018-07-24 DIAGNOSIS — T78.1XXA OTHER ADVERSE FOOD REACTIONS, NOT ELSEWHERE CLASSIFIED, INITIAL ENCOUNTER: ICD-10-CM

## 2018-07-24 DIAGNOSIS — M25.569 PAIN IN UNSPECIFIED KNEE: ICD-10-CM

## 2018-07-24 PROCEDURE — 99213 OFFICE O/P EST LOW 20 MIN: CPT | Mod: GE

## 2018-07-24 PROCEDURE — G0463: CPT

## 2018-07-24 RX ORDER — FOLIC ACID 1 MG/1
1 TABLET ORAL DAILY
Refills: 0 | Status: ACTIVE | COMMUNITY
Start: 2018-07-24

## 2018-07-24 RX ORDER — METRONIDAZOLE 7.5 MG/G
0.75 GEL VAGINAL
Qty: 70 | Refills: 3 | Status: DISCONTINUED | COMMUNITY
Start: 2018-06-28 | End: 2018-07-24

## 2018-07-25 LAB
APPEARANCE: CLEAR
BILIRUBIN URINE: NEGATIVE
BLOOD URINE: NEGATIVE
COLOR: YELLOW
GLUCOSE QUALITATIVE U: NEGATIVE MG/DL
KETONES URINE: NEGATIVE
LEUKOCYTE ESTERASE URINE: NEGATIVE
NITRITE URINE: NEGATIVE
PH URINE: 6
PROTEIN URINE: NEGATIVE MG/DL
SPECIFIC GRAVITY URINE: 1.02
UROBILINOGEN URINE: NEGATIVE MG/DL

## 2018-07-25 NOTE — ASSESSMENT
[FreeTextEntry1] : 49 yo F with a PMH cerebral palsy, fibroids and resultant anemia, cervical neuralgia, chronic urinary incontinence (daily straight cath) with recurrent UTIs, vaginitis, and BV, hyperprolactinemia, and subclinical hypothyroidism who presents for f/u reported Hb drop.\par \par 1) Anemia- asymptomatic\par - Stable, likely 2/2 menstrual bleeding and spotting from myomata\par - Recently started on increased dose of iron QD, continue and plan for labs to monitor at next visit\par - No need for blood work today as asymptomatic\par - Discussed with pt if having sxs such as dizziness/lightheadedness to go to the ED\par - Discussed need to make GYN appt\par \par 2) Suprapubic TTP\par - Pt states when she has UTI's "It's really obvious" but in the setting of TTP, will check UA and Ucx. Pt is afebrile, without CVA TTP, and otherwise asymptomatic\par - Of note, pt has chronic incontinence with straight cath daily, and is on Keflex PPX\par - Of note, pt on Keflex chronically. No history of resistant organisms in past UTIs\par \par 3) Breast nodule, L breast\par - BiRADS 3\par - Due for repeat mammogram/US 6 months from last one, in November 2018.  \par - Followed by breast surgeon\par \par 4) Leiomyoma\par - Likely cause of bleeding\par - No thickened endometrium on US, no family hx of cancer, no weight loss in past 8 months\par - Hb relatively stable, will monitor as noted above\par \par 5) Vitamin D deficiency\par - Last level nml, will continue for now\par \par 6) Hyperprolactinemia\par - Follows up with endo\par - No galactorrhea\par \par 7) Hypothyroidism, subclinical\par - Pt's menorrhagia has reasonable alternative cause (myomata), and otherwise asymptomatic\par - Last free T4 wnl\par - Follows up with endo\par \par HCM\par - Mammogram/US as noted above\par - No bloodwork as noted above\par - UTD on tdap\par - CPE in 2 months\par \par D/w Dr. Fleming

## 2018-07-25 NOTE — REVIEW OF SYSTEMS
[Lower Ext Edema] : lower extremity edema [Fever] : no fever [Chills] : no chills [Fatigue] : no fatigue [Vision Problems] : no vision problems [Hearing Loss] : no hearing loss [Sore Throat] : no sore throat [Chest Pain] : no chest pain [Palpitations] : no palpitations [Shortness Of Breath] : no shortness of breath [Cough] : no cough [Abdominal Pain] : no abdominal pain [Constipation] : no constipation [Diarrhea] : diarrhea [Vomiting] : no vomiting [Melena] : no melena [Dysuria] : no dysuria [Hematuria] : no hematuria [Joint Pain] : no joint pain [Back Pain] : no back pain [Itching] : no itching [Skin Rash] : no skin rash [Headache] : no headache [Dizziness] : no dizziness [Anxiety] : no anxiety [Depression] : no depression [Easy Bleeding] : no easy bleeding [Easy Bruising] : no easy bruising [FreeTextEntry8] : menorrhagia and vaginal spotting

## 2018-07-25 NOTE — PHYSICAL EXAM
[No Acute Distress] : no acute distress [Well Nourished] : well nourished [Well-Appearing] : well-appearing [Normal Sclera/Conjunctiva] : normal sclera/conjunctiva [PERRL] : pupils equal round and reactive to light [EOMI] : extraocular movements intact [Normal Outer Ear/Nose] : the outer ears and nose were normal in appearance [Normal Oropharynx] : the oropharynx was normal [Supple] : supple [Thyroid Normal, No Nodules] : the thyroid was normal and there were no nodules present [No Respiratory Distress] : no respiratory distress  [Clear to Auscultation] : lungs were clear to auscultation bilaterally [Normal Rate] : normal rate  [Regular Rhythm] : with a regular rhythm [Normal S1, S2] : normal S1 and S2 [No Murmur] : no murmur heard [Soft] : abdomen soft [Non-distended] : non-distended [No Masses] : no abdominal mass palpated [No HSM] : no HSM [Normal Bowel Sounds] : normal bowel sounds [Normal Supraclavicular Nodes] : no supraclavicular lymphadenopathy [Normal Posterior Cervical Nodes] : no posterior cervical lymphadenopathy [Normal Anterior Cervical Nodes] : no anterior cervical lymphadenopathy [No CVA Tenderness] : no CVA  tenderness [No Spinal Tenderness] : no spinal tenderness [No Joint Swelling] : no joint swelling [No Rash] : no rash [No Skin Lesions] : no skin lesions [No Focal Deficits] : no focal deficits [Normal Affect] : the affect was normal [Alert and Oriented x3] : oriented to person, place, and time [Normal Mood] : the mood was normal [de-identified] : wheelchair bound [de-identified] : MM dry [de-identified] : 1+ pitting edema (baseline) [de-identified] : No rebound or guarding [de-identified] : suprapubic TTP with mild palpation, no rebound or guarding

## 2018-07-25 NOTE — HISTORY OF PRESENT ILLNESS
[FreeTextEntry8] : 49 yo F with a PMH cerebral palsy, fibroids and resultant anemia, cervical neuralgia, chronic urinary incontinence (daily straight cath) with recurrent UTIs, vaginitis, and BV, hyperprolactinemia, and subclinical hypothyroidism who presents for f/u Hb drop. She has been having vaginal spotting for most of her period month for the past 4-5 months and heavier periods for the past 3 months. Her heavy period days last about 3 days every month. \par She started 3 days ago an increased dose of iron. She had been on 27 mg iron chronically but was increased to 65 (325 total). She has not had spotting for the past week.\par \par She denies dizziness, CP, sob, palpitations, pain, burning with urination, fevers, or chills.\par \par Her gynecologist is Dr. Abdalla, whom she follows regularly. She does not have an appt scheduled yet.

## 2018-07-25 NOTE — DATA REVIEWED
[FreeTextEntry1] : [facility] HIE:\par May 2018\par - TV US: stable myomata in the setting of pt's known history of fibroids\par - Mammogram and Breast US: BiRADS-3, new L breast mass, benign appearing

## 2018-08-08 DIAGNOSIS — D64.9 ANEMIA, UNSPECIFIED: ICD-10-CM

## 2018-08-08 DIAGNOSIS — R32 UNSPECIFIED URINARY INCONTINENCE: ICD-10-CM

## 2018-08-08 DIAGNOSIS — E22.1 HYPERPROLACTINEMIA: ICD-10-CM

## 2018-08-08 DIAGNOSIS — E55.9 VITAMIN D DEFICIENCY, UNSPECIFIED: ICD-10-CM

## 2018-08-08 DIAGNOSIS — R10.819 ABDOMINAL TENDERNESS, UNSPECIFIED SITE: ICD-10-CM

## 2018-09-04 ENCOUNTER — APPOINTMENT (OUTPATIENT)
Dept: PHYSICAL MEDICINE AND REHAB | Facility: CLINIC | Age: 48
End: 2018-09-04
Payer: MEDICARE

## 2018-09-04 VITALS — DIASTOLIC BLOOD PRESSURE: 66 MMHG | SYSTOLIC BLOOD PRESSURE: 102 MMHG | OXYGEN SATURATION: 97 % | HEART RATE: 60 BPM

## 2018-09-04 PROCEDURE — 99213 OFFICE O/P EST LOW 20 MIN: CPT

## 2018-09-05 ENCOUNTER — APPOINTMENT (OUTPATIENT)
Dept: ORTHOPEDIC SURGERY | Facility: CLINIC | Age: 48
End: 2018-09-05
Payer: MEDICARE

## 2018-09-05 DIAGNOSIS — G89.29 PAIN IN RIGHT HIP: ICD-10-CM

## 2018-09-05 DIAGNOSIS — M24.851 OTHER SPECIFIC JOINT DERANGEMENTS OF RIGHT HIP, NOT ELSEWHERE CLASSIFIED: ICD-10-CM

## 2018-09-05 DIAGNOSIS — M25.551 PAIN IN RIGHT HIP: ICD-10-CM

## 2018-09-05 PROCEDURE — 73502 X-RAY EXAM HIP UNI 2-3 VIEWS: CPT | Mod: RT

## 2018-09-05 PROCEDURE — 99214 OFFICE O/P EST MOD 30 MIN: CPT

## 2018-09-06 ENCOUNTER — APPOINTMENT (OUTPATIENT)
Dept: OTOLARYNGOLOGY | Facility: CLINIC | Age: 48
End: 2018-09-06
Payer: MEDICARE

## 2018-09-26 ENCOUNTER — LABORATORY RESULT (OUTPATIENT)
Age: 48
End: 2018-09-26

## 2018-09-26 ENCOUNTER — APPOINTMENT (OUTPATIENT)
Dept: INTERNAL MEDICINE | Facility: CLINIC | Age: 48
End: 2018-09-26

## 2018-09-26 ENCOUNTER — OUTPATIENT (OUTPATIENT)
Dept: OUTPATIENT SERVICES | Facility: HOSPITAL | Age: 48
LOS: 1 days | End: 2018-09-26
Payer: MEDICARE

## 2018-09-26 PROCEDURE — 82746 ASSAY OF FOLIC ACID SERUM: CPT

## 2018-09-26 PROCEDURE — 82728 ASSAY OF FERRITIN: CPT

## 2018-09-26 PROCEDURE — 82607 VITAMIN B-12: CPT

## 2018-09-26 PROCEDURE — 83550 IRON BINDING TEST: CPT

## 2018-09-26 PROCEDURE — 85045 AUTOMATED RETICULOCYTE COUNT: CPT

## 2018-09-27 DIAGNOSIS — R32 UNSPECIFIED URINARY INCONTINENCE: ICD-10-CM

## 2018-09-27 DIAGNOSIS — D64.9 ANEMIA, UNSPECIFIED: ICD-10-CM

## 2018-09-27 DIAGNOSIS — R10.819 ABDOMINAL TENDERNESS, UNSPECIFIED SITE: ICD-10-CM

## 2018-09-27 DIAGNOSIS — E55.9 VITAMIN D DEFICIENCY, UNSPECIFIED: ICD-10-CM

## 2018-09-27 DIAGNOSIS — E22.1 HYPERPROLACTINEMIA: ICD-10-CM

## 2018-09-27 LAB
BASOPHILS # BLD AUTO: 0.02 K/UL — SIGNIFICANT CHANGE UP (ref 0–0.2)
BASOPHILS NFR BLD AUTO: 0.4 % — SIGNIFICANT CHANGE UP (ref 0–2)
EOSINOPHIL # BLD AUTO: 0.12 K/UL — SIGNIFICANT CHANGE UP (ref 0–0.5)
EOSINOPHIL NFR BLD AUTO: 2.2 % — SIGNIFICANT CHANGE UP (ref 0–6)
FERRITIN SERPL-MCNC: 39 NG/ML — SIGNIFICANT CHANGE UP (ref 15–150)
FOLATE SERPL-MCNC: 16.3 NG/ML — SIGNIFICANT CHANGE UP
HCT VFR BLD CALC: 36.5 % — SIGNIFICANT CHANGE UP (ref 34.5–45)
HGB BLD-MCNC: 11.5 G/DL — SIGNIFICANT CHANGE UP (ref 11.5–15.5)
IMM GRANULOCYTES NFR BLD AUTO: 0 % — SIGNIFICANT CHANGE UP (ref 0–1.5)
IRON SATN MFR SERPL: 206 UG/DL — HIGH (ref 30–160)
IRON SATN MFR SERPL: 61 % — HIGH (ref 14–50)
LYMPHOCYTES # BLD AUTO: 2.04 K/UL — SIGNIFICANT CHANGE UP (ref 1–3.3)
LYMPHOCYTES # BLD AUTO: 37 % — SIGNIFICANT CHANGE UP (ref 13–44)
MCHC RBC-ENTMCNC: 29.9 PG — SIGNIFICANT CHANGE UP (ref 27–34)
MCHC RBC-ENTMCNC: 31.5 GM/DL — LOW (ref 32–36)
MCV RBC AUTO: 95.1 FL — SIGNIFICANT CHANGE UP (ref 80–100)
MONOCYTES # BLD AUTO: 0.34 K/UL — SIGNIFICANT CHANGE UP (ref 0–0.9)
MONOCYTES NFR BLD AUTO: 6.2 % — SIGNIFICANT CHANGE UP (ref 2–14)
NEUTROPHILS # BLD AUTO: 2.99 K/UL — SIGNIFICANT CHANGE UP (ref 1.8–7.4)
NEUTROPHILS NFR BLD AUTO: 54.2 % — SIGNIFICANT CHANGE UP (ref 43–77)
PLATELET # BLD AUTO: 232 K/UL — SIGNIFICANT CHANGE UP (ref 150–400)
RBC # BLD: 3.84 M/UL — SIGNIFICANT CHANGE UP (ref 3.8–5.2)
RBC # BLD: 3.84 M/UL — SIGNIFICANT CHANGE UP (ref 3.8–5.2)
RBC # FLD: 13.8 % — SIGNIFICANT CHANGE UP (ref 10.3–14.5)
RETICS #: 60.7 K/UL — SIGNIFICANT CHANGE UP (ref 25–125)
RETICS/RBC NFR: 1.6 % — SIGNIFICANT CHANGE UP (ref 0.5–2.5)
TIBC SERPL-MCNC: 340 UG/DL — SIGNIFICANT CHANGE UP (ref 220–430)
UIBC SERPL-MCNC: 134 UG/DL — SIGNIFICANT CHANGE UP (ref 110–370)
VIT B12 SERPL-MCNC: >2000 PG/ML — HIGH (ref 232–1245)
WBC # BLD: 5.51 K/UL — SIGNIFICANT CHANGE UP (ref 3.8–10.5)
WBC # FLD AUTO: 5.51 K/UL — SIGNIFICANT CHANGE UP (ref 3.8–10.5)

## 2018-10-02 ENCOUNTER — FORM ENCOUNTER (OUTPATIENT)
Age: 48
End: 2018-10-02

## 2018-10-03 ENCOUNTER — APPOINTMENT (OUTPATIENT)
Dept: ULTRASOUND IMAGING | Facility: IMAGING CENTER | Age: 48
End: 2018-10-03
Payer: MEDICARE

## 2018-10-03 ENCOUNTER — OUTPATIENT (OUTPATIENT)
Dept: OUTPATIENT SERVICES | Facility: HOSPITAL | Age: 48
LOS: 1 days | End: 2018-10-03
Payer: MEDICARE

## 2018-10-03 DIAGNOSIS — N31.9 NEUROMUSCULAR DYSFUNCTION OF BLADDER, UNSPECIFIED: ICD-10-CM

## 2018-10-03 PROCEDURE — 76770 US EXAM ABDO BACK WALL COMP: CPT

## 2018-10-03 PROCEDURE — 76770 US EXAM ABDO BACK WALL COMP: CPT | Mod: 26

## 2018-10-18 ENCOUNTER — APPOINTMENT (OUTPATIENT)
Dept: OBGYN | Facility: CLINIC | Age: 48
End: 2018-10-18
Payer: MEDICARE

## 2018-10-18 PROCEDURE — 99213 OFFICE O/P EST LOW 20 MIN: CPT

## 2018-10-19 ENCOUNTER — APPOINTMENT (OUTPATIENT)
Dept: INTERNAL MEDICINE | Facility: CLINIC | Age: 48
End: 2018-10-19

## 2018-10-23 ENCOUNTER — APPOINTMENT (OUTPATIENT)
Dept: INFECTIOUS DISEASE | Facility: CLINIC | Age: 48
End: 2018-10-23
Payer: MEDICARE

## 2018-10-23 VITALS — SYSTOLIC BLOOD PRESSURE: 133 MMHG | DIASTOLIC BLOOD PRESSURE: 82 MMHG

## 2018-10-23 VITALS
HEIGHT: 59 IN | OXYGEN SATURATION: 97 % | TEMPERATURE: 98.1 F | WEIGHT: 105 LBS | HEART RATE: 95 BPM | DIASTOLIC BLOOD PRESSURE: 89 MMHG | BODY MASS INDEX: 21.17 KG/M2 | SYSTOLIC BLOOD PRESSURE: 139 MMHG

## 2018-10-23 PROCEDURE — 99214 OFFICE O/P EST MOD 30 MIN: CPT

## 2018-10-25 ENCOUNTER — APPOINTMENT (OUTPATIENT)
Dept: UROLOGY | Facility: CLINIC | Age: 48
End: 2018-10-25

## 2018-10-26 ENCOUNTER — APPOINTMENT (OUTPATIENT)
Dept: UROLOGY | Facility: CLINIC | Age: 48
End: 2018-10-26
Payer: MEDICARE

## 2018-10-26 ENCOUNTER — APPOINTMENT (OUTPATIENT)
Dept: OBGYN | Facility: CLINIC | Age: 48
End: 2018-10-26
Payer: MEDICARE

## 2018-10-26 PROCEDURE — 99213 OFFICE O/P EST LOW 20 MIN: CPT

## 2018-10-30 ENCOUNTER — APPOINTMENT (OUTPATIENT)
Dept: ENDOCRINOLOGY | Facility: CLINIC | Age: 48
End: 2018-10-30
Payer: MEDICARE

## 2018-10-30 VITALS
TEMPERATURE: 98.7 F | OXYGEN SATURATION: 98 % | HEART RATE: 80 BPM | SYSTOLIC BLOOD PRESSURE: 110 MMHG | DIASTOLIC BLOOD PRESSURE: 70 MMHG | HEIGHT: 59 IN

## 2018-10-30 PROCEDURE — G0008: CPT

## 2018-10-30 PROCEDURE — 99214 OFFICE O/P EST MOD 30 MIN: CPT | Mod: 25

## 2018-10-30 PROCEDURE — 90686 IIV4 VACC NO PRSV 0.5 ML IM: CPT

## 2018-11-01 ENCOUNTER — OUTPATIENT (OUTPATIENT)
Dept: OUTPATIENT SERVICES | Facility: HOSPITAL | Age: 48
LOS: 1 days | End: 2018-11-01
Payer: MEDICARE

## 2018-11-01 ENCOUNTER — APPOINTMENT (OUTPATIENT)
Dept: INTERNAL MEDICINE | Facility: CLINIC | Age: 48
End: 2018-11-01
Payer: MEDICARE

## 2018-11-01 VITALS
WEIGHT: 105 LBS | HEIGHT: 59 IN | BODY MASS INDEX: 21.17 KG/M2 | DIASTOLIC BLOOD PRESSURE: 70 MMHG | SYSTOLIC BLOOD PRESSURE: 110 MMHG

## 2018-11-01 DIAGNOSIS — Z87.2 PERSONAL HISTORY OF DISEASES OF THE SKIN AND SUBCUTANEOUS TISSUE: ICD-10-CM

## 2018-11-01 DIAGNOSIS — I10 ESSENTIAL (PRIMARY) HYPERTENSION: ICD-10-CM

## 2018-11-01 PROCEDURE — G0463: CPT

## 2018-11-01 PROCEDURE — 99213 OFFICE O/P EST LOW 20 MIN: CPT | Mod: GE

## 2018-11-02 LAB
ALBUMIN SERPL ELPH-MCNC: 4 G/DL
ALP BLD-CCNC: 75 U/L
ALT SERPL-CCNC: 27 U/L
ANION GAP SERPL CALC-SCNC: 12 MMOL/L
AST SERPL-CCNC: 15 U/L
BASOPHILS # BLD AUTO: 0.02 K/UL
BASOPHILS NFR BLD AUTO: 0.4 %
BILIRUB SERPL-MCNC: 0.2 MG/DL
BUN SERPL-MCNC: 11 MG/DL
CALCIUM SERPL-MCNC: 9.2 MG/DL
CHLORIDE SERPL-SCNC: 103 MMOL/L
CO2 SERPL-SCNC: 22 MMOL/L
CREAT SERPL-MCNC: 0.34 MG/DL
EOSINOPHIL # BLD AUTO: 0.11 K/UL
EOSINOPHIL NFR BLD AUTO: 2.2 %
ESTRADIOL SERPL-MCNC: 169 PG/ML
FSH SERPL-MCNC: 5.4 IU/L
GLUCOSE SERPL-MCNC: 80 MG/DL
HCT VFR BLD CALC: 33.6 %
HGB BLD-MCNC: 10.8 G/DL
IMM GRANULOCYTES NFR BLD AUTO: 0.4 %
LH SERPL-ACNC: 9.7 IU/L
LYMPHOCYTES # BLD AUTO: 1.74 K/UL
LYMPHOCYTES NFR BLD AUTO: 34.9 %
MAN DIFF?: NORMAL
MCHC RBC-ENTMCNC: 30 PG
MCHC RBC-ENTMCNC: 32.1 GM/DL
MCV RBC AUTO: 93.3 FL
MONOCYTES # BLD AUTO: 0.44 K/UL
MONOCYTES NFR BLD AUTO: 8.8 %
NEUTROPHILS # BLD AUTO: 2.66 K/UL
NEUTROPHILS NFR BLD AUTO: 53.3 %
PLATELET # BLD AUTO: 424 K/UL
POTASSIUM SERPL-SCNC: 4.3 MMOL/L
PROLACTIN SERPL-MCNC: 123.9 NG/ML
PROT SERPL-MCNC: 7.5 G/DL
RBC # BLD: 3.6 M/UL
RBC # FLD: 13 %
SODIUM SERPL-SCNC: 137 MMOL/L
T4 FREE SERPL-MCNC: 1.1 NG/DL
TSH SERPL-ACNC: 4.13 UIU/ML
WBC # FLD AUTO: 4.99 K/UL

## 2018-11-02 NOTE — ASSESSMENT
[FreeTextEntry1] : 47 yo F with a PMH cerebral palsy, uterine fibroids with menorrhagia, anemia, cervical neuralgia, chronic urinary incontinence (daily straight cath) with recurrent UTIs, vaginitis, and BV, hyperprolactinemia, and subclinical hypothyroidism presenting for CPE.\par \par #Anemia\par - Pt with Hgb drop from 12 to 11, then 10 within the past few months, coinciding with when she developed heavy menses; however, MCV has always remained in the 90s, RDW normal, with normal reticulocyte count\par - Iron levels low before starting ferrous sulfate, ferritin borderline low suggestive of iron deficiency anemia, however normal MCV and RDW may suggest another ongoing process\par - Vitamin B12 and folate levels WNL\par - Will refer pt to hematology for further evaluation\par \par #Bartholin cyst\par - Symptoms currently resolved\par - Pt will follow up with her gynecologist next week\par \par #Recurrent UTIs\par - C/w Keflex for chronic suppression\par - Pt to follow up with ID in 4 months\par \par #Hyperprolactinemia\par - Pt following up with endocrinology; prolactin levels remain elevated but relatively stable\par \par #HCM\par - UTD with mammogram in March; US scheduled for next week\par - Flu shot given on 10/30\par - UTD with Tdap\par \par RTC in 1 year for CPE

## 2018-11-02 NOTE — PHYSICAL EXAM
[No Acute Distress] : no acute distress [Well Nourished] : well nourished [Well Developed] : well developed [Well-Appearing] : well-appearing [Normal Sclera/Conjunctiva] : normal sclera/conjunctiva [PERRL] : pupils equal round and reactive to light [EOMI] : extraocular movements intact [Normal Outer Ear/Nose] : the outer ears and nose were normal in appearance [Normal Oropharynx] : the oropharynx was normal [No JVD] : no jugular venous distention [Supple] : supple [No Lymphadenopathy] : no lymphadenopathy [No Respiratory Distress] : no respiratory distress  [Clear to Auscultation] : lungs were clear to auscultation bilaterally [No Accessory Muscle Use] : no accessory muscle use [Normal Rate] : normal rate  [Regular Rhythm] : with a regular rhythm [Normal S1, S2] : normal S1 and S2 [No Murmur] : no murmur heard [No Edema] : there was no peripheral edema [Soft] : abdomen soft [Non Tender] : non-tender [Non-distended] : non-distended [Normal Bowel Sounds] : normal bowel sounds [Normal Posterior Cervical Nodes] : no posterior cervical lymphadenopathy [Normal Anterior Cervical Nodes] : no anterior cervical lymphadenopathy [No Joint Swelling] : no joint swelling [Grossly Normal Strength/Tone] : grossly normal strength/tone [No Rash] : no rash [No Skin Lesions] : no skin lesions [Normal Affect] : the affect was normal [Normal Insight/Judgement] : insight and judgment were intact [de-identified] : Pt sitting in wheelchair [de-identified] : Cerumen in left canal, impacted cerumen in right canal [de-identified] : Contracted hands b/l [de-identified] : Pt able to move upper extremities, decreased strength in b/l lower extremities

## 2018-11-02 NOTE — REVIEW OF SYSTEMS
[Lower Ext Edema] : lower extremity edema [Shortness Of Breath] : shortness of breath [Nausea] : nausea [Incontinence] : incontinence [Dizziness] : dizziness [Fever] : no fever [Chills] : no chills [Fatigue] : no fatigue [Discharge] : no discharge [Itching] : no itching [Hearing Loss] : no hearing loss [Sore Throat] : no sore throat [Chest Pain] : no chest pain [Palpitations] : no palpitations [Cough] : no cough [Abdominal Pain] : no abdominal pain [Constipation] : no constipation [Diarrhea] : diarrhea [Vomiting] : no vomiting [Dysuria] : no dysuria [Hematuria] : no hematuria [Joint Pain] : no joint pain [Muscle Pain] : no muscle pain [Skin Rash] : no skin rash [Depression] : no depression

## 2018-11-02 NOTE — HISTORY OF PRESENT ILLNESS
[FreeTextEntry1] : CPE [de-identified] : 47 yo F with a PMH cerebral palsy, uterine fibroids with menorrhagia, anemia, cervical neuralgia, chronic urinary incontinence (daily straight cath) with recurrent UTIs, vaginitis, and BV, hyperprolactinemia, and subclinical hypothyroidism presenting for CPE.\bryce [place] Pt feels well at today's visit. 2 weeks ago, however, she had a right-sided Bartholin abscess which was very painful. She had fevers at home and saw her gynecologist, who prescribed her cefpodoxime, which she took for 7 days. She states that the abscess drained on its own at home and she no longer has any symptoms. She continues to straight cath daily due to urinary retention. \bryce [place] Pt reports her last LMP was 2 weeks ago, and she is still having heavy periods. She goes through multiple pads daily during her period. She feels dizzy and SOB at times. She continues to take iron supplements as prescribed. \bryce [place] Pt lives at home with a home attendant for 12 hours during the day every day of the week. She is wheelchair bound and needs assistance with ADLs. She denies any history of smoking, alcohol use or illicit drug use.

## 2018-11-06 DIAGNOSIS — E22.1 HYPERPROLACTINEMIA: ICD-10-CM

## 2018-11-06 DIAGNOSIS — D64.9 ANEMIA, UNSPECIFIED: ICD-10-CM

## 2018-11-06 DIAGNOSIS — N39.0 URINARY TRACT INFECTION, SITE NOT SPECIFIED: ICD-10-CM

## 2018-11-06 DIAGNOSIS — E03.9 HYPOTHYROIDISM, UNSPECIFIED: ICD-10-CM

## 2018-11-07 ENCOUNTER — FORM ENCOUNTER (OUTPATIENT)
Age: 48
End: 2018-11-07

## 2018-11-08 ENCOUNTER — OUTPATIENT (OUTPATIENT)
Dept: OUTPATIENT SERVICES | Facility: HOSPITAL | Age: 48
LOS: 1 days | End: 2018-11-08
Payer: MEDICARE

## 2018-11-08 ENCOUNTER — APPOINTMENT (OUTPATIENT)
Dept: ULTRASOUND IMAGING | Facility: IMAGING CENTER | Age: 48
End: 2018-11-08
Payer: MEDICARE

## 2018-11-08 DIAGNOSIS — N63.0 UNSPECIFIED LUMP IN UNSPECIFIED BREAST: ICD-10-CM

## 2018-11-08 PROCEDURE — 76642 ULTRASOUND BREAST LIMITED: CPT

## 2018-11-08 PROCEDURE — 76642 ULTRASOUND BREAST LIMITED: CPT | Mod: 26,LT

## 2018-11-09 ENCOUNTER — APPOINTMENT (OUTPATIENT)
Dept: OBGYN | Facility: CLINIC | Age: 48
End: 2018-11-09
Payer: MEDICARE

## 2018-11-09 PROCEDURE — 99213 OFFICE O/P EST LOW 20 MIN: CPT

## 2018-11-13 ENCOUNTER — RX RENEWAL (OUTPATIENT)
Age: 48
End: 2018-11-13

## 2018-11-15 ENCOUNTER — APPOINTMENT (OUTPATIENT)
Dept: OTOLARYNGOLOGY | Facility: CLINIC | Age: 48
End: 2018-11-15
Payer: MEDICARE

## 2018-11-15 VITALS — BODY MASS INDEX: 21.17 KG/M2 | HEIGHT: 59 IN | WEIGHT: 105 LBS

## 2018-11-15 PROCEDURE — 69210 REMOVE IMPACTED EAR WAX UNI: CPT

## 2018-11-15 PROCEDURE — 99213 OFFICE O/P EST LOW 20 MIN: CPT | Mod: 25

## 2018-11-20 ENCOUNTER — APPOINTMENT (OUTPATIENT)
Dept: OPHTHALMOLOGY | Facility: CLINIC | Age: 48
End: 2018-11-20
Payer: MEDICARE

## 2018-11-20 DIAGNOSIS — H31.091 OTHER CHORIORETINAL SCARS, RIGHT EYE: ICD-10-CM

## 2018-11-20 PROCEDURE — 92014 COMPRE OPH EXAM EST PT 1/>: CPT

## 2018-11-23 ENCOUNTER — APPOINTMENT (OUTPATIENT)
Dept: UROLOGY | Facility: CLINIC | Age: 48
End: 2018-11-23

## 2018-11-30 ENCOUNTER — APPOINTMENT (OUTPATIENT)
Dept: HEMATOLOGY ONCOLOGY | Facility: CLINIC | Age: 48
End: 2018-11-30

## 2018-12-03 ENCOUNTER — OUTPATIENT (OUTPATIENT)
Dept: OUTPATIENT SERVICES | Facility: HOSPITAL | Age: 48
LOS: 1 days | Discharge: HOME | End: 2018-12-03

## 2018-12-03 DIAGNOSIS — D64.9 ANEMIA, UNSPECIFIED: ICD-10-CM

## 2018-12-04 ENCOUNTER — APPOINTMENT (OUTPATIENT)
Dept: HEMATOLOGY ONCOLOGY | Facility: CLINIC | Age: 48
End: 2018-12-04
Payer: MEDICARE

## 2018-12-04 ENCOUNTER — RESULT REVIEW (OUTPATIENT)
Age: 48
End: 2018-12-04

## 2018-12-04 VITALS
SYSTOLIC BLOOD PRESSURE: 107 MMHG | RESPIRATION RATE: 16 BRPM | HEART RATE: 75 BPM | DIASTOLIC BLOOD PRESSURE: 72 MMHG | TEMPERATURE: 98.6 F | OXYGEN SATURATION: 99 %

## 2018-12-04 LAB
BASOPHILS # BLD AUTO: 0 K/UL — SIGNIFICANT CHANGE UP (ref 0–0.2)
BASOPHILS NFR BLD AUTO: 0.7 % — SIGNIFICANT CHANGE UP (ref 0–2)
EOSINOPHIL # BLD AUTO: 0.1 K/UL — SIGNIFICANT CHANGE UP (ref 0–0.5)
EOSINOPHIL NFR BLD AUTO: 1.5 % — SIGNIFICANT CHANGE UP (ref 0–6)
HCT VFR BLD CALC: 35.2 % — SIGNIFICANT CHANGE UP (ref 34.5–45)
HGB BLD-MCNC: 11.9 G/DL — SIGNIFICANT CHANGE UP (ref 11.5–15.5)
LYMPHOCYTES # BLD AUTO: 1.9 K/UL — SIGNIFICANT CHANGE UP (ref 1–3.3)
LYMPHOCYTES # BLD AUTO: 29.9 % — SIGNIFICANT CHANGE UP (ref 13–44)
MCHC RBC-ENTMCNC: 30.9 PG — SIGNIFICANT CHANGE UP (ref 27–34)
MCHC RBC-ENTMCNC: 33.8 G/DL — SIGNIFICANT CHANGE UP (ref 32–36)
MCV RBC AUTO: 91.3 FL — SIGNIFICANT CHANGE UP (ref 80–100)
MONOCYTES # BLD AUTO: 0.5 K/UL — SIGNIFICANT CHANGE UP (ref 0–0.9)
MONOCYTES NFR BLD AUTO: 8.2 % — SIGNIFICANT CHANGE UP (ref 2–14)
NEUTROPHILS # BLD AUTO: 3.8 K/UL — SIGNIFICANT CHANGE UP (ref 1.8–7.4)
NEUTROPHILS NFR BLD AUTO: 59.7 % — SIGNIFICANT CHANGE UP (ref 43–77)
PLATELET # BLD AUTO: 224 K/UL — SIGNIFICANT CHANGE UP (ref 150–400)
RBC # BLD: 3.86 M/UL — SIGNIFICANT CHANGE UP (ref 3.8–5.2)
RBC # FLD: 12.1 % — SIGNIFICANT CHANGE UP (ref 10.3–14.5)
WBC # BLD: 6.4 K/UL — SIGNIFICANT CHANGE UP (ref 3.8–10.5)
WBC # FLD AUTO: 6.4 K/UL — SIGNIFICANT CHANGE UP (ref 3.8–10.5)

## 2018-12-04 PROCEDURE — 99204 OFFICE O/P NEW MOD 45 MIN: CPT

## 2018-12-06 NOTE — PHYSICAL EXAM
[Normal] : affect appropriate [de-identified] : in wheelchair [de-identified] : contracted hands b/l; decreased strength b/l LE

## 2018-12-06 NOTE — ASSESSMENT
[FreeTextEntry1] : 47yo F w/ cerebral palsy, chronic urinary incontinence (daily straight cath) with recurrent UTIs, vaginitis, hyperprolactinemia, and subclinical hypothyroidism here for evaluation of anemia.\par Her Hgb today is wnl - 11.9. Will continue her current vitamins. Cont monitoring her CBC every 4 months. \par f/u w/ PMD, gyn and endo as scheduled.

## 2018-12-06 NOTE — HISTORY OF PRESENT ILLNESS
[de-identified] : 47yo F w/ cerebral palsy, chronic urinary incontinence (daily straight cath) with recurrent UTIs, vaginitis, hyperprolactinemia, and subclinical hypothyroidism here for evaluation of anemia.\par Her Hgb has ranged from 10.8-11.2 over the last year. Her MCV is normal with normal WBC and plt count. Iron studies, B12, folate unremarkable in Sept. Retic count wnl. CMP showed a low Cr, but otherwise normal. \par Had recent UTI s/p course of Abx. She is on Keflex for PPx. No other medications. She is taking multiple vitamins including iron 325mg once daily (since July 2018), vit C 1000, B12, folic acid, D3 5000, and probiotic. \par \par She has very heavy menses for 2 days, gotten heavier since April. She has known fibroids. LMP 11/14/18, lasts 2 weeks.

## 2019-01-03 ENCOUNTER — CLINICAL ADVICE (OUTPATIENT)
Age: 49
End: 2019-01-03

## 2019-02-12 ENCOUNTER — APPOINTMENT (OUTPATIENT)
Dept: INTERNAL MEDICINE | Facility: CLINIC | Age: 49
End: 2019-02-12

## 2019-02-12 VITALS — SYSTOLIC BLOOD PRESSURE: 120 MMHG | DIASTOLIC BLOOD PRESSURE: 60 MMHG

## 2019-02-12 DIAGNOSIS — B35.1 TINEA UNGUIUM: ICD-10-CM

## 2019-02-13 NOTE — PHYSICAL EXAM
[No Acute Distress] : no acute distress [Well Nourished] : well nourished [Well Developed] : well developed [Normal Sclera/Conjunctiva] : normal sclera/conjunctiva [PERRL] : pupils equal round and reactive to light [Normal Outer Ear/Nose] : the outer ears and nose were normal in appearance [Normal Oropharynx] : the oropharynx was normal [No JVD] : no jugular venous distention [Supple] : supple [No Respiratory Distress] : no respiratory distress  [Clear to Auscultation] : lungs were clear to auscultation bilaterally [No Accessory Muscle Use] : no accessory muscle use [Normal Rate] : normal rate  [Regular Rhythm] : with a regular rhythm [Normal S1, S2] : normal S1 and S2 [No Murmur] : no murmur heard [No Edema] : there was no peripheral edema [Soft] : abdomen soft [Non Tender] : non-tender [Normal Bowel Sounds] : normal bowel sounds [Normal Posterior Cervical Nodes] : no posterior cervical lymphadenopathy [Normal Anterior Cervical Nodes] : no anterior cervical lymphadenopathy [No Joint Swelling] : no joint swelling [No Rash] : no rash [No Skin Lesions] : no skin lesions [Speech Grossly Normal] : speech grossly normal [Normal Affect] : the affect was normal [de-identified] : Wheelchair-bound [de-identified] : B/l hands contracted at the wrist [de-identified] : Weakness of b/l upper and lower extremities, wheelchair-bound

## 2019-02-13 NOTE — ASSESSMENT
[facility] [address] [address]    Phone:  [phone]                                       After Visit Summary   2018    BabyFarzad Matthews    MRN: [id number]            ID Band Verification     Baby ID 4-part identification band #: [id number]  My baby and I both have the same number on our ID bands. I have confirmed this with a nurse.    .....................................................................................................................    ...........     Patient/Patient Representative Signature           DATE                  After Visit Summary Signature Page     I have received my discharge instructions, and my questions have been answered. I have discussed any challenges I see with this plan with the nurse or doctor.    ..........................................................................................................................................  Patient/Patient Representative Signature      ..........................................................................................................................................  Patient Representative Print Name and Relationship to Patient    ..................................................               ................................................  Date                                            Time    ..........................................................................................................................................  Reviewed by Signature/Title    ...................................................              ..............................................  Date                                                            Time           [FreeTextEntry1] : 49 yo F with a history of cerebral palsy, uterine fibroids with menorrhagia, anemia, cervical neuralgia, chronic urinary incontinence (daily straight cath) with recurrent UTIs, vaginitis, and BV, hyperprolactinemia, and subclinical hypothyroidism presenting for a follow up visit.\par \par

## 2019-02-13 NOTE — HISTORY OF PRESENT ILLNESS
[FreeTextEntry1] : Follow up [de-identified] : 49 yo F with a history of cerebral palsy, uterine fibroids with menorrhagia, anemia, cervical neuralgia, chronic urinary incontinence (daily straight cath) with recurrent UTIs, vaginitis, and BV, hyperprolactinemia, and subclinical hypothyroidism presenting for a follow up visit.\par \par Patient today needs several forms filled out for nursing and home attendant care. She has had issues with her insurance and she has not had home attendants every day for the past 7 weeks. She has been relying on the help of several volunteer nurses to take care of her at home instead. She has not been able to catheterize herself daily and feels some mild burning with urination and thinks she may be having a yeast infection again due to her menses. She denies any suprapubic pain, fevers or chills. She has also been having increased stress and anxiety which has been worsening her symptoms of IBS. She has been having increased loose stools which she is upset about as she does not reliably have someone at home on a daily basis to change her and she is worried about developing a UTI.

## 2019-02-13 NOTE — REVIEW OF SYSTEMS
[Dysuria] : dysuria [Vaginal Discharge] : vaginal discharge [Joint Pain] : joint pain [Back Pain] : back pain [Headache] : headache [Fever] : no fever [Chills] : no chills [Recent Change In Weight] : ~T no recent weight change [Discharge] : no discharge [Vision Problems] : no vision problems [Hearing Loss] : no hearing loss [Sore Throat] : no sore throat [Chest Pain] : no chest pain [Palpitations] : no palpitations [Shortness Of Breath] : no shortness of breath [Cough] : no cough [Abdominal Pain] : no abdominal pain [Vomiting] : no vomiting [Incontinence] : no incontinence [Hematuria] : no hematuria [Frequency] : no frequency [Itching] : no itching [Skin Rash] : no skin rash

## 2019-02-13 NOTE — END OF VISIT
[] : Resident [FreeTextEntry3] : Patient is a 49 yo F with a history of cerebral palsy, uterine fibroids with menorrhagia, anemia, cervical neuralgia, chronic urinary incontinence (daily straight cath) with recurrent UTIs, vaginitis, and BV, hyperprolactinemia, and subclinical hypothyroidism presenting for a follow up visit. Complaints of mild burning with urination, but endorses it does not feel like a UTI but rather like a yeast infection and she will start antifungal topicals today. No other signs or symptoms of UTI. If does not improves, advised to call clinic for possible empiric treatment and obtaining urine cultures. Will fill out needed paper work today.

## 2019-03-16 ENCOUNTER — TRANSCRIPTION ENCOUNTER (OUTPATIENT)
Age: 49
End: 2019-03-16

## 2019-03-19 ENCOUNTER — APPOINTMENT (OUTPATIENT)
Dept: PHYSICAL MEDICINE AND REHAB | Facility: CLINIC | Age: 49
End: 2019-03-19
Payer: MEDICARE

## 2019-03-19 VITALS — SYSTOLIC BLOOD PRESSURE: 112 MMHG | DIASTOLIC BLOOD PRESSURE: 71 MMHG | HEART RATE: 84 BPM

## 2019-03-19 PROCEDURE — 99213 OFFICE O/P EST LOW 20 MIN: CPT

## 2019-03-19 NOTE — HISTORY OF PRESENT ILLNESS
[FreeTextEntry1] : Patient still with neck discomfort - wears collar at times.\par Does not want medications.\par Working with PT- doing stand-pivot transfers.\par Concerned about leg length discrepancy. \par No other new medical issues at this time.

## 2019-03-19 NOTE — REVIEW OF SYSTEMS
[Dysmenorrhea/Abn Vaginal Bleeding] : dysmenorrhea/abnormal vaginal bleeding [Negative] : Heme/Lymph [FreeTextEntry9] : neck pain [de-identified] : spasticity, quadriplegia

## 2019-03-19 NOTE — PHYSICAL EXAM
[Normal] : Oriented to person, place, and time, insight and judgement were intact and the affect was normal [FreeTextEntry1] : \par L leg from ASIS to med malleolus- 29.25"\par R leg from ASIS to med malleolus- 30.25"\par  [de-identified] : In W/C; + tenderness with palpation cervical paraspinals, discomfort with end range rotation and flexion [de-identified] : Isamar 3+ spasticity bl LE extensors, bl FFs/WFs, bl LEs trace, bl UE fair

## 2019-03-19 NOTE — ASSESSMENT
[FreeTextEntry1] : - Referred to PT/OT\par - Offered botox for UE spasticity- will consider\par - Cervical collar when in automobile\par - AFOs\par - Shoe lift for leg length discrepancy\par - f/u 4 months

## 2019-03-20 ENCOUNTER — TRANSCRIPTION ENCOUNTER (OUTPATIENT)
Age: 49
End: 2019-03-20

## 2019-03-29 ENCOUNTER — RX RENEWAL (OUTPATIENT)
Age: 49
End: 2019-03-29

## 2019-04-11 ENCOUNTER — APPOINTMENT (OUTPATIENT)
Dept: NEUROLOGY | Facility: CLINIC | Age: 49
End: 2019-04-11

## 2019-04-24 ENCOUNTER — OUTPATIENT (OUTPATIENT)
Dept: OUTPATIENT SERVICES | Facility: HOSPITAL | Age: 49
LOS: 1 days | End: 2019-04-24
Payer: MEDICARE

## 2019-04-24 ENCOUNTER — APPOINTMENT (OUTPATIENT)
Dept: INTERNAL MEDICINE | Facility: CLINIC | Age: 49
End: 2019-04-24
Payer: MEDICARE

## 2019-04-24 VITALS
BODY MASS INDEX: 22.18 KG/M2 | SYSTOLIC BLOOD PRESSURE: 100 MMHG | DIASTOLIC BLOOD PRESSURE: 70 MMHG | OXYGEN SATURATION: 98 % | WEIGHT: 110 LBS | TEMPERATURE: 98.8 F | HEART RATE: 88 BPM | HEIGHT: 59 IN

## 2019-04-24 DIAGNOSIS — I10 ESSENTIAL (PRIMARY) HYPERTENSION: ICD-10-CM

## 2019-04-24 PROCEDURE — 99213 OFFICE O/P EST LOW 20 MIN: CPT | Mod: GE

## 2019-04-24 PROCEDURE — G0463: CPT

## 2019-04-26 NOTE — END OF VISIT
[FreeTextEntry3] : Kidney function to be assessed (BMP) because of possibility of post-obstructive uropathy. [] : Resident

## 2019-04-26 NOTE — PHYSICAL EXAM
[No Acute Distress] : no acute distress [Well Nourished] : well nourished [Well Developed] : well developed [Normal Sclera/Conjunctiva] : normal sclera/conjunctiva [PERRL] : pupils equal round and reactive to light [Normal Outer Ear/Nose] : the outer ears and nose were normal in appearance [Normal Oropharynx] : the oropharynx was normal [No JVD] : no jugular venous distention [Supple] : supple [No Respiratory Distress] : no respiratory distress  [Clear to Auscultation] : lungs were clear to auscultation bilaterally [No Accessory Muscle Use] : no accessory muscle use [Normal Rate] : normal rate  [Regular Rhythm] : with a regular rhythm [Normal S1, S2] : normal S1 and S2 [No Murmur] : no murmur heard [No Edema] : there was no peripheral edema [Soft] : abdomen soft [Non Tender] : non-tender [Normal Bowel Sounds] : normal bowel sounds [Normal Posterior Cervical Nodes] : no posterior cervical lymphadenopathy [Normal Anterior Cervical Nodes] : no anterior cervical lymphadenopathy [No Joint Swelling] : no joint swelling [No Rash] : no rash [No Skin Lesions] : no skin lesions [Speech Grossly Normal] : speech grossly normal [Normal Affect] : the affect was normal [de-identified] : Wheelchair-bound [de-identified] : Weakness of b/l upper and lower extremities, wheelchair-bound [de-identified] : B/l hands contracted at the wrist

## 2019-04-26 NOTE — ASSESSMENT
[FreeTextEntry1] : 49 yo F with a history of cerebral palsy, uterine fibroids with menorrhagia, anemia, cervical neuralgia, chronic urinary incontinence (daily straight cath) with recurrent UTIs, vaginitis, and BV, hyperprolactinemia, and subclinical hypothyroidism presenting with cc of lower extremity swelling.\par \par

## 2019-04-26 NOTE — HISTORY OF PRESENT ILLNESS
[FreeTextEntry1] : 47 yo F with a history of cerebral palsy, uterine fibroids with menorrhagia, anemia, cervical neuralgia, chronic urinary incontinence (daily straight cath) with recurrent UTIs, vaginitis, and BV, hyperprolactinemia, and subclinical hypothyroidism presenting with cc of lower extremity swelling.  [de-identified] : Patient reported wheelchair bound for the past 20 years and dependent on straight cath. Patient's visiting nurse service was interrupted recently 4/1 to 4/13 and she was not able to have straight cath for urinary retention during the interim. Patient noticed bilateral LE swelling up to knee around the same time. Since the resumption of this nursing service and straight cath provided, she noticed LE swelling has resolved. Patient also had sore throat for one day. No f/c or sob. Only dry cough only.

## 2019-04-26 NOTE — PLAN
[FreeTextEntry1] : LE swelling: unclear etiology, unclear if this is related to urinary retention. However, currently resolved.\par - Will continue to monitor.\par \par Urinary retention:\par - c/w chronic abx for UTI ppx.\par - Patient does has medical needs for nursing service providing straight cath daily at this time.\par \par URI:\par - No f/c or signs of bacterial infection.\par - Saline nasal irrigation.\par - Prn flonase.\par \par Plan discussed with attending.\par \par Keagan Wright PGY3

## 2019-04-26 NOTE — REVIEW OF SYSTEMS
[Cough] : cough [Fever] : no fever [Chills] : no chills [Recent Change In Weight] : ~T no recent weight change [Discharge] : no discharge [Vision Problems] : no vision problems [Hearing Loss] : no hearing loss [Sore Throat] : no sore throat [Chest Pain] : no chest pain [Palpitations] : no palpitations [Shortness Of Breath] : no shortness of breath [Wheezing] : no wheezing [Dyspnea on Exertion] : no dyspnea on exertion [Abdominal Pain] : no abdominal pain [Vomiting] : no vomiting [Dysuria] : no dysuria [Incontinence] : no incontinence [Hematuria] : no hematuria [Frequency] : no frequency [Vaginal Discharge] : no vaginal discharge [Joint Pain] : no joint pain [Joint Stiffness] : no joint stiffness [Joint Swelling] : no joint swelling [Muscle Weakness] : no muscle weakness [Muscle Pain] : no muscle pain [Back Pain] : no back pain [Itching] : no itching [Skin Rash] : no skin rash [Headache] : no headache

## 2019-04-30 ENCOUNTER — APPOINTMENT (OUTPATIENT)
Dept: PHYSICAL MEDICINE AND REHAB | Facility: CLINIC | Age: 49
End: 2019-04-30

## 2019-04-30 ENCOUNTER — APPOINTMENT (OUTPATIENT)
Dept: INFECTIOUS DISEASE | Facility: CLINIC | Age: 49
End: 2019-04-30
Payer: MEDICARE

## 2019-04-30 VITALS
DIASTOLIC BLOOD PRESSURE: 64 MMHG | TEMPERATURE: 98.1 F | WEIGHT: 110 LBS | BODY MASS INDEX: 22.22 KG/M2 | OXYGEN SATURATION: 98 % | SYSTOLIC BLOOD PRESSURE: 97 MMHG | HEART RATE: 77 BPM

## 2019-04-30 PROCEDURE — 99214 OFFICE O/P EST MOD 30 MIN: CPT

## 2019-05-01 ENCOUNTER — APPOINTMENT (OUTPATIENT)
Dept: ENDOCRINOLOGY | Facility: CLINIC | Age: 49
End: 2019-05-01
Payer: MEDICARE

## 2019-05-01 VITALS
BODY MASS INDEX: 22.18 KG/M2 | HEART RATE: 70 BPM | OXYGEN SATURATION: 97 % | HEIGHT: 59 IN | DIASTOLIC BLOOD PRESSURE: 60 MMHG | SYSTOLIC BLOOD PRESSURE: 90 MMHG | WEIGHT: 110 LBS

## 2019-05-01 DIAGNOSIS — R32 UNSPECIFIED URINARY INCONTINENCE: ICD-10-CM

## 2019-05-01 DIAGNOSIS — G82.20 PARAPLEGIA, UNSPECIFIED: ICD-10-CM

## 2019-05-01 DIAGNOSIS — N39.0 URINARY TRACT INFECTION, SITE NOT SPECIFIED: ICD-10-CM

## 2019-05-01 PROCEDURE — 99214 OFFICE O/P EST MOD 30 MIN: CPT

## 2019-05-01 NOTE — ASSESSMENT
[FreeTextEntry1] : 48 y.o. female with h/o hyperprolactinemia, subclinical hypothyroidism, and vitamin D def.\par 1. Hyperprolactinemia- Suspect pituitary microadenoma. Since getting menses and no galactorrhea, have been monitoring given medication sensitivity. If prolactin level is stable, will continue to monitor. Will check serum prolactin. \par 2. Subclinical hypothyroidism- Will check TFTs and if stable, will continue to monitor.\par 3. Vitamin D def- Will check 25 vitamin D level and will continue supplement. Regarding bone health, DEXA scan shows normal BMD. Will monitor for now and repeat in several years.\par 4. Anemia- Will check CBC and iron studies.\par \par If stable, follow up in 6 months

## 2019-05-01 NOTE — REVIEW OF SYSTEMS
[Irregular Menses] : irregular menses [Fatigue] : fatigue [Myalgia] : myalgia  [Dry Skin] : dry skin [Hair Loss] : hair loss [Negative] : Respiratory [FreeTextEntry7] : bowels fluctuate [Polydipsia] : no polydipsia [Swelling] : no swelling

## 2019-05-01 NOTE — HISTORY OF PRESENT ILLNESS
[FreeTextEntry1] : 48 y.o. female with h/o CP, hyperprolactinemia and subclinical hypothyroidism here for follow up visit. Reports regular menses and follows with GYN. Does have fibroids and reports bleeding in between menses has stopped. C/o dry skin. Had avulsion fracture in the past. Last brain MRI in June 2014 showed no obvious pituitary lesion but was noncontrast. Getting headaches but not severe. No galactorrhea. C/o hair loss but stable. Has been taking iron 325 mg daily. Dealing with chronic UTIs and BV infection. Follows with ID. Frustrated with home assistant not being able to make visits for urine catherization. \par \par In regards to thyroid disease, did not tolerate treatment with T4 in the past. Energy is fair. No neck complaints. \par \par Regarding bone health, wheel chair bound therefore unable to perform weight bearing activity. Had DEXA scan in November 2017 showing spine -0.9 (arthritic changes) , left femoral neck -0.5, total hip -0.6 and 1/3 radius 0.2. Takes vitamin D3 5,000 IU daily. Eats leafy greens and cheese. No falls.

## 2019-05-01 NOTE — PHYSICAL EXAM
[No Acute Distress] : no acute distress [Alert] : alert [Supple] : the neck was supple [Normal Sclera/Conjunctiva] : normal sclera/conjunctiva [EOMI] : extra ocular movement intact [No LAD] : no lymphadenopathy [Thyroid Not Enlarged] : the thyroid was not enlarged [No Accessory Muscle Use] : no accessory muscle use [Clear to Auscultation] : lungs were clear to auscultation bilaterally [Regular Rhythm] : with a regular rhythm [Normal S1, S2] : normal S1 and S2 [Not Tender] : non-tender [No Edema] : there was no peripheral edema [Normal Bowel Sounds] : normal bowel sounds [No Clubbing, Cyanosis] : no clubbing  or cyanosis of the fingernails [Soft] : abdomen soft [Not Distended] : not distended [No Rash] : no rash [Normal Affect] : the affect was normal [Normal Mood] : the mood was normal [Normal Reflexes] : deep tendon reflexes were 2+ and symmetric [Abdominal Striae] : no abdominal striae [Acne] : no acne [de-identified] : contracted [Acanthosis Nigricans] : no acanthosis nigricans

## 2019-05-02 LAB — BACTERIA UR CULT: NORMAL

## 2019-05-03 LAB
25(OH)D3 SERPL-MCNC: 52 NG/ML
ALBUMIN SERPL ELPH-MCNC: 4.2 G/DL
ALP BLD-CCNC: 65 U/L
ALT SERPL-CCNC: 19 U/L
ANION GAP SERPL CALC-SCNC: 9 MMOL/L
AST SERPL-CCNC: 14 U/L
BASOPHILS # BLD AUTO: 0.04 K/UL
BASOPHILS NFR BLD AUTO: 0.7 %
BILIRUB SERPL-MCNC: <0.2 MG/DL
BUN SERPL-MCNC: 18 MG/DL
CALCIUM SERPL-MCNC: 9.2 MG/DL
CHLORIDE SERPL-SCNC: 101 MMOL/L
CHOLEST SERPL-MCNC: 209 MG/DL
CHOLEST/HDLC SERPL: 2.5 RATIO
CO2 SERPL-SCNC: 25 MMOL/L
CREAT SERPL-MCNC: 0.35 MG/DL
EOSINOPHIL # BLD AUTO: 0.16 K/UL
EOSINOPHIL NFR BLD AUTO: 2.9 %
FERRITIN SERPL-MCNC: 25 NG/ML
GLUCOSE SERPL-MCNC: 89 MG/DL
HCT VFR BLD CALC: 35.9 %
HDLC SERPL-MCNC: 84 MG/DL
HGB BLD-MCNC: 11.8 G/DL
IMM GRANULOCYTES NFR BLD AUTO: 0.4 %
IRON SATN MFR SERPL: 81 %
IRON SERPL-MCNC: 266 UG/DL
LDLC SERPL CALC-MCNC: 113 MG/DL
LYMPHOCYTES # BLD AUTO: 1.99 K/UL
LYMPHOCYTES NFR BLD AUTO: 36.4 %
MAN DIFF?: NORMAL
MCHC RBC-ENTMCNC: 30.4 PG
MCHC RBC-ENTMCNC: 32.9 GM/DL
MCV RBC AUTO: 92.5 FL
MONOCYTES # BLD AUTO: 0.46 K/UL
MONOCYTES NFR BLD AUTO: 8.4 %
NEUTROPHILS # BLD AUTO: 2.79 K/UL
NEUTROPHILS NFR BLD AUTO: 51.2 %
PLATELET # BLD AUTO: 257 K/UL
POTASSIUM SERPL-SCNC: 4.3 MMOL/L
PROLACTIN SERPL-MCNC: 96.9 NG/ML
PROT SERPL-MCNC: 7.1 G/DL
RBC # BLD: 3.88 M/UL
RBC # FLD: 12.3 %
SODIUM SERPL-SCNC: 135 MMOL/L
T4 FREE SERPL-MCNC: 1.2 NG/DL
TIBC SERPL-MCNC: 329 UG/DL
TRIGL SERPL-MCNC: 62 MG/DL
TSH SERPL-ACNC: 4.67 UIU/ML
UIBC SERPL-MCNC: 63 UG/DL
WBC # FLD AUTO: 5.46 K/UL

## 2019-05-03 NOTE — ASSESSMENT
[FreeTextEntry1] : 48-year-old female comes for followup visit for UTI and chronic abx prophylaxis. Her  symptoms are stable overall. \par \par Continue Keflex for chronic prophylaxis. Pt seems to be overall improved since starting daily prophylaxis\par \par Will check urine cx.\par \par F/u with medical team for medical issues.  [Treatment Adherence] : treatment adherence [Treatment Education] : treatment education [Rx Dose / Side Effects] : Rx dose/side effects

## 2019-05-03 NOTE — PHYSICAL EXAM
[General Appearance - In No Acute Distress] : in no acute distress [General Appearance - Alert] : alert [Sclera] : the sclera and conjunctiva were normal [PERRL With Normal Accommodation] : pupils were equal in size, round, reactive to light [Extraocular Movements] : extraocular movements were intact [Outer Ear] : the ears and nose were normal in appearance [Oropharynx] : the oropharynx was normal with no thrush [Neck Appearance] : the appearance of the neck was normal [Jugular Venous Distention Increased] : there was no jugular-venous distention [Neck Cervical Mass (___cm)] : no neck mass was observed [Thyroid Diffuse Enlargement] : the thyroid was not enlarged [Auscultation Breath Sounds / Voice Sounds] : lungs were clear to auscultation bilaterally [Heart Sounds] : normal S1 and S2 [Heart Rate And Rhythm] : heart rate was normal and rhythm regular [Heart Sounds Gallop] : no gallops [Murmurs] : no murmurs [Abdomen Soft] : soft [Heart Sounds Pericardial Friction Rub] : no pericardial rub [Bowel Sounds] : normal bowel sounds [Abdomen Tenderness] : non-tender [Abdomen Mass (___ Cm)] : no abdominal mass palpated [] : no rash [Costovertebral Angle Tenderness] : no CVA tenderness [FreeTextEntry1] : wheelchair bound [Oriented To Time, Place, And Person] : oriented to person, place, and time [Affect] : the affect was normal

## 2019-05-03 NOTE — HISTORY OF PRESENT ILLNESS
[FreeTextEntry1] : 47 y/o female comes for f/u visit. \par \par She is on long term keflex for chronic UTI. No fever.\par \par Stable. Having issues with nursing care at home due to insurance issues.

## 2019-05-10 ENCOUNTER — APPOINTMENT (OUTPATIENT)
Dept: OTOLARYNGOLOGY | Facility: CLINIC | Age: 49
End: 2019-05-10

## 2019-05-13 ENCOUNTER — FORM ENCOUNTER (OUTPATIENT)
Age: 49
End: 2019-05-13

## 2019-05-14 ENCOUNTER — APPOINTMENT (OUTPATIENT)
Dept: ULTRASOUND IMAGING | Facility: IMAGING CENTER | Age: 49
End: 2019-05-14
Payer: MEDICARE

## 2019-05-14 ENCOUNTER — OUTPATIENT (OUTPATIENT)
Dept: OUTPATIENT SERVICES | Facility: HOSPITAL | Age: 49
LOS: 1 days | End: 2019-05-14
Payer: MEDICARE

## 2019-05-14 ENCOUNTER — APPOINTMENT (OUTPATIENT)
Dept: MAMMOGRAPHY | Facility: IMAGING CENTER | Age: 49
End: 2019-05-14
Payer: MEDICARE

## 2019-05-14 DIAGNOSIS — Z00.8 ENCOUNTER FOR OTHER GENERAL EXAMINATION: ICD-10-CM

## 2019-05-14 PROCEDURE — 77067 SCR MAMMO BI INCL CAD: CPT

## 2019-05-14 PROCEDURE — 77063 BREAST TOMOSYNTHESIS BI: CPT | Mod: 26

## 2019-05-14 PROCEDURE — 77067 SCR MAMMO BI INCL CAD: CPT | Mod: 26

## 2019-05-14 PROCEDURE — 77063 BREAST TOMOSYNTHESIS BI: CPT

## 2019-05-14 PROCEDURE — 76641 ULTRASOUND BREAST COMPLETE: CPT | Mod: 26,50

## 2019-05-14 PROCEDURE — 76641 ULTRASOUND BREAST COMPLETE: CPT

## 2019-05-15 ENCOUNTER — APPOINTMENT (OUTPATIENT)
Dept: ULTRASOUND IMAGING | Facility: IMAGING CENTER | Age: 49
End: 2019-05-15
Payer: MEDICARE

## 2019-05-15 ENCOUNTER — APPOINTMENT (OUTPATIENT)
Age: 49
End: 2019-05-15
Payer: MEDICARE

## 2019-05-15 ENCOUNTER — OUTPATIENT (OUTPATIENT)
Dept: OUTPATIENT SERVICES | Facility: HOSPITAL | Age: 49
LOS: 1 days | End: 2019-05-15
Payer: MEDICARE

## 2019-05-15 VITALS
BODY MASS INDEX: 22.18 KG/M2 | SYSTOLIC BLOOD PRESSURE: 102 MMHG | WEIGHT: 110 LBS | HEART RATE: 78 BPM | RESPIRATION RATE: 15 BRPM | HEIGHT: 59 IN | OXYGEN SATURATION: 98 % | DIASTOLIC BLOOD PRESSURE: 66 MMHG

## 2019-05-15 DIAGNOSIS — D25.9 LEIOMYOMA OF UTERUS, UNSPECIFIED: ICD-10-CM

## 2019-05-15 PROCEDURE — 76856 US EXAM PELVIC COMPLETE: CPT | Mod: 26

## 2019-05-15 PROCEDURE — 76856 US EXAM PELVIC COMPLETE: CPT

## 2019-05-15 PROCEDURE — 99214 OFFICE O/P EST MOD 30 MIN: CPT

## 2019-05-15 NOTE — ASSESSMENT
[FreeTextEntry1] : Multiple bilateral breast nodules\par New nodules bilaterally\par BIRADS-3 breast ultrasound \par Reassured patient index of suspicion for malignancy is low.\par Will get 6 month follow up bilateral breast ultrasound\par RTO 1 year after yearly breast imaging.

## 2019-05-15 NOTE — HISTORY OF PRESENT ILLNESS
[de-identified] : 47 y/o female presents for follow up.\par History multiple bilateral benign US guided needle biopsies (fibrocystic changes and fibroadenomas) \par Denies personal or family history of breast or ovarian cancer\par \par Bilateral MMG/US 5/14/19: Multiple bilateral cysts and nodules bilaterally. Right breast 12:00 4 cm fn: newly noted 0.5 cm circumscribed hypoechoic mass. Newly noted circumscribed hypoechoic masses in left breast at 3:00 4 cm fn, 3:00 3 cm fn, and 7:00 8 cm fn. No evidence of malignancy. BIRADS-3. Mammogram/sonogram recommended in one year. \par \par She c/o breast tenderness upon palpation. \par Denies palpable breast masses, nipple discharge, nipple retraction/inversion, or skin changes. . \par Denies constitutional symptoms. \par Denies fever or chills.  \par Pt has CP and presents in a wheelchair.

## 2019-05-15 NOTE — PHYSICAL EXAM
[Normal] : supple, no neck mass and thyroid not enlarged [Normal Neck Lymph Nodes] : normal neck lymph nodes  [Normal Supraclavicular Lymph Nodes] : normal supraclavicular lymph nodes [Normal Axillary Lymph Nodes] : normal axillary lymph nodes [Normal Groin Lymph Nodes] : normal groin lymph nodes [Normal] : oriented to person, place and time, with appropriate affect [de-identified] : No dominant masses or adenopathy bilaterally.

## 2019-05-15 NOTE — ADDENDUM
[FreeTextEntry1] : I, Misha Jesus, acted solely as a scribe for Dr. Nav Garza on this date 05/15/2019.

## 2019-05-15 NOTE — CONSULT LETTER
[Dear  ___] : Dear  [unfilled], [Courtesy Letter:] : I had the pleasure of seeing your patient, [unfilled], in my office today. [Please see my note below.] : Please see my note below. [Sincerely,] : Sincerely, [FreeTextEntry3] : Nav Garza MD FACS

## 2019-06-10 ENCOUNTER — RESULT REVIEW (OUTPATIENT)
Age: 49
End: 2019-06-10

## 2019-06-11 ENCOUNTER — APPOINTMENT (OUTPATIENT)
Dept: OBGYN | Facility: CLINIC | Age: 49
End: 2019-06-11
Payer: MEDICARE

## 2019-06-11 PROCEDURE — 99213 OFFICE O/P EST LOW 20 MIN: CPT | Mod: 25

## 2019-06-11 PROCEDURE — G0101: CPT

## 2019-06-14 ENCOUNTER — APPOINTMENT (OUTPATIENT)
Dept: UROLOGY | Facility: CLINIC | Age: 49
End: 2019-06-14
Payer: MEDICARE

## 2019-06-14 ENCOUNTER — RX RENEWAL (OUTPATIENT)
Age: 49
End: 2019-06-14

## 2019-06-14 PROCEDURE — 99213 OFFICE O/P EST LOW 20 MIN: CPT

## 2019-06-14 NOTE — REVIEW OF SYSTEMS
[Diarrhea] : diarrhea [Incontinence] : incontinence [see HPI] : see HPI [Limb Weakness] : limb weakness [Difficulty Walking] : difficulty walking [Negative] : Heme/Lymph

## 2019-06-14 NOTE — HISTORY OF PRESENT ILLNESS
[FreeTextEntry1] : 49 year old woman with CP,Tethered cord syndrome,NGB, multiple surgeries on Hips and ankles as a child.She has been told of NGB since 1995 , last UDS was in 2016 -Urodynamic Interpretation : Normal bladder sensation. Normal bladder capacity. Patient experiencing no detrusor instability. The uroflow rate is decreased. The uroflow pattern is staccato. The detrusor contractility is normal. I am unable to assess if patient has bladder outlet obstruction. The intravesical voiding pressure is increased. The patient has incomplete bladder emptying, a post void residual of 185 cc. Unable to assess the spincter activity. \par She also reports Hx of tethered cord- low lying conus 2014, not sure of SCI.She was instructed to do CIC X 2 , but unable to do same due to dexterity issues and Home care agency only covers 1  CIC visit.\par She had no CIC services form home care agency from Dec 31  TO MID APRIL 2019 . She gets CIC once per day with home care RN with volumes of 500   cc.Volitional voids X 4  hours with hesitancy, straining, weak stream ,intermittency and post void dribbling with incomplete emptying sensation.Denies UI, use pad for vaginal discharge. Fluids vary 80 - 90 oz - water mainly, herbal tea ,and cranberry juice.She is on prophylactic Keflex 250 mg daily and it has helped with reduction in her UTI.Reports NO UTI since last visit.Her SS with a UTI are dysuria,sp pressure, muscle aches,frequency ,urgency.Reports rare Urge Incontinence , uses 1 pad for security when going out for vaginal discharge.She has been on Keflex 250  mg po prophylaxis since 5/14/2012 and doing well , no UTI since last visit.She uses # 14 hydrophilic catheter for her CIC and wants to know if we have BPA free product - will look into it and inform pt.  \par Bowel issues: IBS- diarrhea predominant .\par She has multiple uterine fibroids and may consider Hysterectomy and will update us. \par Renal US in Oct 2019 due- orders in.\par RTO in a year.

## 2019-06-14 NOTE — PHYSICAL EXAM
[Normal Appearance] : normal appearance [Well Groomed] : well groomed [General Appearance - Well Nourished] : well nourished [Abdomen Soft] : soft [General Appearance - In No Acute Distress] : no acute distress [] : no respiratory distress [Oriented To Time, Place, And Person] : oriented to person, place, and time [FreeTextEntry1] : Wheelchair bound - assist with ADLS

## 2019-06-14 NOTE — ASSESSMENT
[FreeTextEntry1] : Impression/Plan:49 year old woman with CP,Tethered cord syndrome,NGB, multiple surgeries on Hips and ankles as a child.She has been told of NGB since 1995 , last UDS was in 2016 -Urodynamic Interpretation : Normal bladder sensation. Normal bladder capacity. Patient experiencing no detrusor instability. The uroflow rate is decreased. The uroflow pattern is staccato. The detrusor contractility is normal. I am unable to assess if patient has bladder outlet obstruction. The intravesical voiding pressure is increased. The patient has incomplete bladder emptying, a post void residual of 185 cc. Unable to assess the spincter activity. \par She also reports Hx of tethered cord- low lying conus 2014, not sure of SCI.She was instructed to do CIC X 2 , but unable to do same due to dexterity issues and Home care agency only covers 1  CIC visit.\par She had no CIC services form home care agency from Dec 31  TO MID APRIL 2019 . She gets CIC once per day with home care RN with volumes of 500   cc.Volitional voids X 4  hours with hesitancy, straining, weak stream ,intermittency and post void dribbling with incomplete emptying sensation. Fluids vary 80 - 90 oz - water mainly, herbal tea ,and cranberry juice.She is on prophylactic Keflex 250 mg daily and it has helped with reduction in her UTI.Reports NO UTI since last visit.Her SS with a UTI are dysuria,sp pressure, muscle aches,frequency ,urgency.Reports rare Urge Incontinence , uses 1 pad for security when going out for vaginal discharge.\par Bowel issues: IBS- diarrhea predominant .\par She has multiple uterine fibroids and may consider Hysterectomy and will update us. Call us if any home care agency issues with doing CIC.\par 1.Renal US in Oct 2019 due- orders in.\par 2.RTO in a year. \par

## 2019-07-16 ENCOUNTER — FORM ENCOUNTER (OUTPATIENT)
Age: 49
End: 2019-07-16

## 2019-07-17 ENCOUNTER — APPOINTMENT (OUTPATIENT)
Dept: OBGYN | Facility: CLINIC | Age: 49
End: 2019-07-17
Payer: MEDICARE

## 2019-07-17 ENCOUNTER — RESULT REVIEW (OUTPATIENT)
Age: 49
End: 2019-07-17

## 2019-07-17 ENCOUNTER — FORM ENCOUNTER (OUTPATIENT)
Age: 49
End: 2019-07-17

## 2019-07-17 PROCEDURE — 56405 I&D VULVA/PERINEAL ABSCESS: CPT

## 2019-07-19 ENCOUNTER — APPOINTMENT (OUTPATIENT)
Dept: OBGYN | Facility: CLINIC | Age: 49
End: 2019-07-19

## 2019-07-19 ENCOUNTER — APPOINTMENT (OUTPATIENT)
Dept: OBGYN | Facility: CLINIC | Age: 49
End: 2019-07-19
Payer: MEDICARE

## 2019-07-19 PROCEDURE — 99213 OFFICE O/P EST LOW 20 MIN: CPT

## 2019-07-24 ENCOUNTER — APPOINTMENT (OUTPATIENT)
Dept: OBGYN | Facility: CLINIC | Age: 49
End: 2019-07-24
Payer: MEDICARE

## 2019-07-24 PROCEDURE — 99024 POSTOP FOLLOW-UP VISIT: CPT

## 2019-08-09 ENCOUNTER — APPOINTMENT (OUTPATIENT)
Dept: INTERNAL MEDICINE | Facility: CLINIC | Age: 49
End: 2019-08-09

## 2019-08-29 ENCOUNTER — APPOINTMENT (OUTPATIENT)
Dept: INTERNAL MEDICINE | Facility: CLINIC | Age: 49
End: 2019-08-29

## 2019-09-06 ENCOUNTER — APPOINTMENT (OUTPATIENT)
Dept: PHYSICAL MEDICINE AND REHAB | Facility: CLINIC | Age: 49
End: 2019-09-06

## 2019-09-12 ENCOUNTER — APPOINTMENT (OUTPATIENT)
Dept: OTOLARYNGOLOGY | Facility: CLINIC | Age: 49
End: 2019-09-12
Payer: MEDICARE

## 2019-09-12 VITALS
WEIGHT: 110 LBS | DIASTOLIC BLOOD PRESSURE: 59 MMHG | BODY MASS INDEX: 22.18 KG/M2 | HEART RATE: 86 BPM | SYSTOLIC BLOOD PRESSURE: 94 MMHG | HEIGHT: 59 IN

## 2019-09-12 PROCEDURE — 69210 REMOVE IMPACTED EAR WAX UNI: CPT

## 2019-09-12 PROCEDURE — 99213 OFFICE O/P EST LOW 20 MIN: CPT | Mod: 25

## 2019-09-12 NOTE — HISTORY OF PRESENT ILLNESS
[No] : patient does not have a  history of radiation therapy [de-identified] : 48 yo\par chronic nasal congestion not consistently performing steam humidification\par Bilateral chronic severe cerumen impaction requiring debridement\par neurologic disorder requiring wheelchair confinement\par no throat complaints\par no modifying factors\par  [Ear Fullness] : ear fullness [Otalgia] : otalgia [Nasal Congestion] : nasal congestion [None] : No risk factors have been identified.

## 2019-09-12 NOTE — REASON FOR VISIT
[Subsequent Evaluation] : a subsequent evaluation for [Ear Pain] : ear pain [Nasal Obstruction] : nasal obstruction

## 2019-09-12 NOTE — ASSESSMENT
[FreeTextEntry1] : After informed verbal consent is obtained, cerumen is removed from the right and left  ear canal with a curette and suction.\par Rec: \par Debrox is to be placed in both ears on a routine basis to keep them free of wax.\par Routine debridement suggested to keep the ears free of wax.\par \par deviated septum with associated rhinitis-hypertonic saline nasal irrigations or steam

## 2019-09-12 NOTE — PHYSICAL EXAM
[de-identified] : bilateral cerumen [Nasal Endoscopy Performed] : nasal endoscopy was performed, see procedure section for findings [] : septum deviated bilaterally [de-identified] : congested [Midline] : trachea located in midline position [Normal] : salivary glands are normal

## 2019-09-13 ENCOUNTER — APPOINTMENT (OUTPATIENT)
Dept: NEUROLOGY | Facility: CLINIC | Age: 49
End: 2019-09-13
Payer: MEDICARE

## 2019-09-13 VITALS
DIASTOLIC BLOOD PRESSURE: 69 MMHG | SYSTOLIC BLOOD PRESSURE: 107 MMHG | HEIGHT: 59 IN | HEART RATE: 78 BPM | BODY MASS INDEX: 22.18 KG/M2 | WEIGHT: 110 LBS

## 2019-09-13 PROCEDURE — 99213 OFFICE O/P EST LOW 20 MIN: CPT

## 2019-09-13 NOTE — ASSESSMENT
[FreeTextEntry1] : Patient has a cervical myelopathy not cerebral palsy, based on her history and the normal MRI brain in 2008 and the severe myelomalacia on the MRI C spine from 2017. \par \par She will continue to follow with PM&R.  I have explained the difference in the diagnosis and she voiced understanding.  \par \par f/u with me PRN

## 2019-09-13 NOTE — HISTORY OF PRESENT ILLNESS
[FreeTextEntry1] : Last visit was two years ago, she is still WC bound, has some days that right side arm and leg are weaker but then it improves.  She has aide 12 hours/day.  She states she never walked independently and was diagnosed with CP at age 5.

## 2019-09-13 NOTE — PHYSICAL EXAM
[Person] : oriented to person [Place] : oriented to place [Time] : oriented to time [Remote Intact] : remote memory intact [Short Term Intact] : short term memory intact [Registration Intact] : recent registration memory intact [Concentration Intact] : normal concentrating ability [Visual Intact] : visual attention was ~T not ~L decreased [Naming Objects] : no difficulty naming common objects [Repeating Phrases] : no difficulty repeating a phrase [Writing A Sentence] : no difficulty writing a sentence [Fluency] : fluency intact [Comprehension] : comprehension intact [Past History] : adequate knowledge of personal past history [Reading] : reading intact [Cranial Nerves Oculomotor (III)] : extraocular motion intact [Cranial Nerves Optic (II)] : visual acuity intact bilaterally,  visual fields full to confrontation, pupils equal round and reactive to light [Cranial Nerves Facial (VII)] : face symmetrical [Cranial Nerves Trigeminal (V)] : facial sensation intact symmetrically [Cranial Nerves Vestibulocochlear (VIII)] : hearing was intact bilaterally [Cranial Nerves Glossopharyngeal (IX)] : tongue and palate midline [Cranial Nerves Accessory (XI - Cranial And Spinal)] : head turning and shoulder shrug symmetric [Cranial Nerves Hypoglossal (XII)] : there was no tongue deviation with protrusion [Motor Tone] : muscle tone was normal in all four extremities [No Muscle Atrophy] : normal bulk in all four extremities [Motor Handedness Right-Handed] : the patient is right hand dominant [Hand Weakness Right] : the hand  was weak [4] : wrist flexion 4/5 [Hand Weakness Left] : the hand  was weak [3] : hip extension 3/5 [Sensation Tactile Decrease] : light touch was intact [-4] : ankle plantar flexion -4/5 [Sensation Vibration Decrease] : vibration was intact [Position Sensation Decrease Leg/Foot At Level Of Toes] : impaired at the toes in both legs [Tremor] : no tremor present [Past-pointing] : there was no past-pointing [4+] : Ankle jerk right 4+ [Plantar Reflex Right Only] : abnormal on the right [___] : [unfilled] ~Ubeats present on the right [Plantar Reflex Left Only] : abnormal on the left [___] : [unfilled] ~Ubeats present on the left [FreeTextEntry6] : Isamar 3.5 in most muscle groups of flexion and pronation UE, 3 in extensors, 3.5 in LE's

## 2019-10-10 ENCOUNTER — FORM ENCOUNTER (OUTPATIENT)
Age: 49
End: 2019-10-10

## 2019-10-11 ENCOUNTER — OUTPATIENT (OUTPATIENT)
Dept: OUTPATIENT SERVICES | Facility: HOSPITAL | Age: 49
LOS: 1 days | End: 2019-10-11
Payer: MEDICARE

## 2019-10-11 ENCOUNTER — APPOINTMENT (OUTPATIENT)
Dept: ULTRASOUND IMAGING | Facility: IMAGING CENTER | Age: 49
End: 2019-10-11
Payer: MEDICARE

## 2019-10-11 DIAGNOSIS — N31.9 NEUROMUSCULAR DYSFUNCTION OF BLADDER, UNSPECIFIED: ICD-10-CM

## 2019-10-11 DIAGNOSIS — N30.20 OTHER CHRONIC CYSTITIS WITHOUT HEMATURIA: ICD-10-CM

## 2019-10-11 PROCEDURE — 76770 US EXAM ABDO BACK WALL COMP: CPT

## 2019-10-11 PROCEDURE — 76770 US EXAM ABDO BACK WALL COMP: CPT | Mod: 26

## 2019-11-15 ENCOUNTER — APPOINTMENT (OUTPATIENT)
Dept: ENDOCRINOLOGY | Facility: CLINIC | Age: 49
End: 2019-11-15
Payer: MEDICARE

## 2019-11-15 VITALS — OXYGEN SATURATION: 98 % | DIASTOLIC BLOOD PRESSURE: 68 MMHG | SYSTOLIC BLOOD PRESSURE: 122 MMHG | HEART RATE: 84 BPM

## 2019-11-15 PROCEDURE — 90674 CCIIV4 VAC NO PRSV 0.5 ML IM: CPT

## 2019-11-15 PROCEDURE — G0008: CPT

## 2019-11-15 PROCEDURE — 99214 OFFICE O/P EST MOD 30 MIN: CPT | Mod: 25

## 2019-11-15 NOTE — PHYSICAL EXAM
[Alert] : alert [Normal Sclera/Conjunctiva] : normal sclera/conjunctiva [No Acute Distress] : no acute distress [EOMI] : extra ocular movement intact [Supple] : the neck was supple [Thyroid Not Enlarged] : the thyroid was not enlarged [No LAD] : no lymphadenopathy [No Accessory Muscle Use] : no accessory muscle use [Clear to Auscultation] : lungs were clear to auscultation bilaterally [Normal S1, S2] : normal S1 and S2 [Regular Rhythm] : with a regular rhythm [No Edema] : there was no peripheral edema [Normal Bowel Sounds] : normal bowel sounds [Soft] : abdomen soft [Not Tender] : non-tender [Not Distended] : not distended [No Clubbing, Cyanosis] : no clubbing  or cyanosis of the fingernails [No Rash] : no rash [Acne] : no acne [Abdominal Striae] : no abdominal striae [Normal Reflexes] : deep tendon reflexes were 2+ and symmetric [Acanthosis Nigricans] : no acanthosis nigricans [Normal Affect] : the affect was normal [Normal Mood] : the mood was normal [de-identified] : contracted

## 2019-11-15 NOTE — ASSESSMENT
[FreeTextEntry1] : 49 y.o. female with h/o hyperprolactinemia, subclinical hypothyroidism, and vitamin D def.\par 1. Hyperprolactinemia- Suspect pituitary microadenoma. Since getting menses and no galactorrhea, have been monitoring given medication sensitivity. If prolactin level is stable, will continue to monitor. Will check serum prolactin. \par 2. Subclinical hypothyroidism- Will check TFTs and if stable, will continue to monitor.\par 3. Vitamin D def- Will check 25 vitamin D level and will continue supplement. Regarding bone health, DEXA scan shows normal BMD. Will monitor for now and repeat in several years.\par 4. Anemia- Will check CBC and iron studies.\par \par If stable, follow up in 6 months\par Flu vaccine given today

## 2019-11-15 NOTE — HISTORY OF PRESENT ILLNESS
[FreeTextEntry1] : 49 y.o. female with h/o CP, hyperprolactinemia and subclinical hypothyroidism here for follow up visit. Reports irregular menses and follows with GYN. Menstrual cycle in September was just spotting and in October had heavy menstrual cycle. Does have fibroids. C/o dry skin. Had avulsion fracture in the past. Last brain MRI in June 2014 showed no obvious pituitary lesion but was noncontrast. Getting headaches but not severe. No galactorrhea. C/o hair loss is worse. Has been taking iron 325 mg daily. Dealing with chronic UTIs and BV infection. Follows with ID. Does have daily urine catherization. \par \par In regards to thyroid disease, did not tolerate treatment with T4 in the past. Energy is fair. No neck complaints. \par \par Regarding bone health, wheel chair bound therefore unable to perform weight bearing activity. Had DEXA scan in November 2017 showing spine -0.9 (arthritic changes) , left femoral neck -0.5, total hip -0.6 and 1/3 radius 0.2. Takes vitamin D3 5,000 IU daily. Eats leafy greens and cheese. No falls.

## 2019-11-15 NOTE — REVIEW OF SYSTEMS
[Fatigue] : fatigue [Irregular Menses] : irregular menses [Hair Loss] : hair loss [Myalgia] : myalgia  [Dry Skin] : dry skin [Swelling] : no swelling [Polydipsia] : no polydipsia [FreeTextEntry7] : bowels fluctuate [Negative] : Respiratory

## 2019-11-18 LAB
25(OH)D3 SERPL-MCNC: 49.2 NG/ML
ALBUMIN SERPL ELPH-MCNC: 4.3 G/DL
ALP BLD-CCNC: 60 U/L
ALT SERPL-CCNC: 13 U/L
ANION GAP SERPL CALC-SCNC: 12 MMOL/L
AST SERPL-CCNC: 13 U/L
BASOPHILS # BLD AUTO: 0.05 K/UL
BASOPHILS NFR BLD AUTO: 0.9 %
BILIRUB SERPL-MCNC: 0.2 MG/DL
BUN SERPL-MCNC: 16 MG/DL
CALCIUM SERPL-MCNC: 9.5 MG/DL
CHLORIDE SERPL-SCNC: 102 MMOL/L
CHOLEST SERPL-MCNC: 225 MG/DL
CHOLEST/HDLC SERPL: 2.9 RATIO
CO2 SERPL-SCNC: 21 MMOL/L
CREAT SERPL-MCNC: 0.34 MG/DL
EOSINOPHIL # BLD AUTO: 0.15 K/UL
EOSINOPHIL NFR BLD AUTO: 2.8 %
ESTRADIOL SERPL-MCNC: 58 PG/ML
FERRITIN SERPL-MCNC: 19 NG/ML
FSH SERPL-MCNC: 3.7 IU/L
GLUCOSE SERPL-MCNC: 87 MG/DL
HCT VFR BLD CALC: 36 %
HDLC SERPL-MCNC: 77 MG/DL
HGB BLD-MCNC: 11.9 G/DL
IMM GRANULOCYTES NFR BLD AUTO: 0 %
LDLC SERPL CALC-MCNC: 137 MG/DL
LH SERPL-ACNC: 1 IU/L
LYMPHOCYTES # BLD AUTO: 1.87 K/UL
LYMPHOCYTES NFR BLD AUTO: 35.5 %
MAN DIFF?: NORMAL
MCHC RBC-ENTMCNC: 30.1 PG
MCHC RBC-ENTMCNC: 33.1 GM/DL
MCV RBC AUTO: 91.1 FL
MONOCYTES # BLD AUTO: 0.51 K/UL
MONOCYTES NFR BLD AUTO: 9.7 %
NEUTROPHILS # BLD AUTO: 2.69 K/UL
NEUTROPHILS NFR BLD AUTO: 51.1 %
PLATELET # BLD AUTO: 225 K/UL
POTASSIUM SERPL-SCNC: 4.1 MMOL/L
PROLACTIN SERPL-MCNC: 138 NG/ML
PROT SERPL-MCNC: 6.8 G/DL
RBC # BLD: 3.95 M/UL
RBC # FLD: 12.9 %
SODIUM SERPL-SCNC: 135 MMOL/L
T4 FREE SERPL-MCNC: 1 NG/DL
TRIGL SERPL-MCNC: 53 MG/DL
TSH SERPL-ACNC: 4.19 UIU/ML
WBC # FLD AUTO: 5.27 K/UL

## 2019-11-27 ENCOUNTER — LABORATORY RESULT (OUTPATIENT)
Age: 49
End: 2019-11-27

## 2019-11-27 ENCOUNTER — APPOINTMENT (OUTPATIENT)
Dept: INTERNAL MEDICINE | Facility: CLINIC | Age: 49
End: 2019-11-27
Payer: MEDICARE

## 2019-11-27 ENCOUNTER — OUTPATIENT (OUTPATIENT)
Dept: OUTPATIENT SERVICES | Facility: HOSPITAL | Age: 49
LOS: 1 days | End: 2019-11-27

## 2019-11-27 ENCOUNTER — INPATIENT (INPATIENT)
Facility: HOSPITAL | Age: 49
LOS: 0 days | Discharge: HOME HEALTH | DRG: 153 | End: 2019-11-28
Attending: STUDENT IN AN ORGANIZED HEALTH CARE EDUCATION/TRAINING PROGRAM | Admitting: STUDENT IN AN ORGANIZED HEALTH CARE EDUCATION/TRAINING PROGRAM
Payer: COMMERCIAL

## 2019-11-27 VITALS
SYSTOLIC BLOOD PRESSURE: 103 MMHG | OXYGEN SATURATION: 97 % | HEIGHT: 59 IN | DIASTOLIC BLOOD PRESSURE: 75 MMHG | RESPIRATION RATE: 18 BRPM | WEIGHT: 110.01 LBS | TEMPERATURE: 100 F | HEART RATE: 120 BPM

## 2019-11-27 VITALS
DIASTOLIC BLOOD PRESSURE: 70 MMHG | SYSTOLIC BLOOD PRESSURE: 130 MMHG | OXYGEN SATURATION: 98 % | HEART RATE: 120 BPM | TEMPERATURE: 103.1 F

## 2019-11-27 DIAGNOSIS — I10 ESSENTIAL (PRIMARY) HYPERTENSION: ICD-10-CM

## 2019-11-27 DIAGNOSIS — A41.9 SEPSIS, UNSPECIFIED ORGANISM: ICD-10-CM

## 2019-11-27 DIAGNOSIS — Z87.09 PERSONAL HISTORY OF OTHER DISEASES OF THE RESPIRATORY SYSTEM: ICD-10-CM

## 2019-11-27 DIAGNOSIS — R50.9 FEVER, UNSPECIFIED: ICD-10-CM

## 2019-11-27 DIAGNOSIS — N31.9 NEUROMUSCULAR DYSFUNCTION OF BLADDER, UNSPECIFIED: ICD-10-CM

## 2019-11-27 DIAGNOSIS — Z98.890 OTHER SPECIFIED POSTPROCEDURAL STATES: Chronic | ICD-10-CM

## 2019-11-27 DIAGNOSIS — J06.9 ACUTE UPPER RESPIRATORY INFECTION, UNSPECIFIED: ICD-10-CM

## 2019-11-27 DIAGNOSIS — D64.9 ANEMIA, UNSPECIFIED: ICD-10-CM

## 2019-11-27 DIAGNOSIS — N39.0 URINARY TRACT INFECTION, SITE NOT SPECIFIED: ICD-10-CM

## 2019-11-27 DIAGNOSIS — Z29.9 ENCOUNTER FOR PROPHYLACTIC MEASURES, UNSPECIFIED: ICD-10-CM

## 2019-11-27 LAB
ALBUMIN SERPL ELPH-MCNC: 3.9 G/DL — SIGNIFICANT CHANGE UP (ref 3.3–5)
ALP SERPL-CCNC: 55 U/L — SIGNIFICANT CHANGE UP (ref 40–120)
ALT FLD-CCNC: 15 U/L — SIGNIFICANT CHANGE UP (ref 10–45)
ANION GAP SERPL CALC-SCNC: 13 MMOL/L — SIGNIFICANT CHANGE UP (ref 5–17)
APPEARANCE UR: CLEAR — SIGNIFICANT CHANGE UP
APTT BLD: 29.8 SEC — SIGNIFICANT CHANGE UP (ref 27.5–36.3)
AST SERPL-CCNC: 16 U/L — SIGNIFICANT CHANGE UP (ref 10–40)
B PERT DNA SPEC QL NAA+PROBE: SIGNIFICANT CHANGE UP
BASOPHILS # BLD AUTO: 0 K/UL — SIGNIFICANT CHANGE UP (ref 0–0.2)
BASOPHILS NFR BLD AUTO: 0 % — SIGNIFICANT CHANGE UP (ref 0–2)
BILIRUB SERPL-MCNC: 0.3 MG/DL — SIGNIFICANT CHANGE UP (ref 0.2–1.2)
BILIRUB UR-MCNC: NEGATIVE — SIGNIFICANT CHANGE UP
BUN SERPL-MCNC: 7 MG/DL — SIGNIFICANT CHANGE UP (ref 7–23)
C PNEUM DNA SPEC QL NAA+PROBE: SIGNIFICANT CHANGE UP
CALCIUM SERPL-MCNC: 9.2 MG/DL — SIGNIFICANT CHANGE UP (ref 8.4–10.5)
CHLORIDE SERPL-SCNC: 98 MMOL/L — SIGNIFICANT CHANGE UP (ref 96–108)
CO2 SERPL-SCNC: 23 MMOL/L — SIGNIFICANT CHANGE UP (ref 22–31)
COLOR SPEC: SIGNIFICANT CHANGE UP
CREAT SERPL-MCNC: <0.3 MG/DL — LOW (ref 0.5–1.3)
DIFF PNL FLD: NEGATIVE — SIGNIFICANT CHANGE UP
EOSINOPHIL # BLD AUTO: 0 K/UL — SIGNIFICANT CHANGE UP (ref 0–0.5)
EOSINOPHIL NFR BLD AUTO: 0 % — SIGNIFICANT CHANGE UP (ref 0–6)
FLUAV H1 2009 PAND RNA SPEC QL NAA+PROBE: SIGNIFICANT CHANGE UP
FLUAV H1 RNA SPEC QL NAA+PROBE: SIGNIFICANT CHANGE UP
FLUAV H3 RNA SPEC QL NAA+PROBE: SIGNIFICANT CHANGE UP
FLUAV SUBTYP SPEC NAA+PROBE: SIGNIFICANT CHANGE UP
FLUBV RNA SPEC QL NAA+PROBE: SIGNIFICANT CHANGE UP
GAS PNL BLDV: SIGNIFICANT CHANGE UP
GLUCOSE SERPL-MCNC: 114 MG/DL — HIGH (ref 70–99)
GLUCOSE UR QL: NEGATIVE — SIGNIFICANT CHANGE UP
HADV DNA SPEC QL NAA+PROBE: SIGNIFICANT CHANGE UP
HCG SERPL-ACNC: <2 MIU/ML — SIGNIFICANT CHANGE UP
HCOV PNL SPEC NAA+PROBE: SIGNIFICANT CHANGE UP
HCT VFR BLD CALC: 29.5 % — LOW (ref 34.5–45)
HGB BLD-MCNC: 10 G/DL — LOW (ref 11.5–15.5)
HMPV RNA SPEC QL NAA+PROBE: DETECTED
HPIV1 RNA SPEC QL NAA+PROBE: SIGNIFICANT CHANGE UP
HPIV2 RNA SPEC QL NAA+PROBE: SIGNIFICANT CHANGE UP
HPIV3 RNA SPEC QL NAA+PROBE: SIGNIFICANT CHANGE UP
HPIV4 RNA SPEC QL NAA+PROBE: SIGNIFICANT CHANGE UP
INR BLD: 1.08 RATIO — SIGNIFICANT CHANGE UP (ref 0.88–1.16)
KETONES UR-MCNC: ABNORMAL
LEUKOCYTE ESTERASE UR-ACNC: NEGATIVE — SIGNIFICANT CHANGE UP
LYMPHOCYTES # BLD AUTO: 0.73 K/UL — LOW (ref 1–3.3)
LYMPHOCYTES # BLD AUTO: 10.4 % — LOW (ref 13–44)
MCHC RBC-ENTMCNC: 30 PG — SIGNIFICANT CHANGE UP (ref 27–34)
MCHC RBC-ENTMCNC: 33.9 GM/DL — SIGNIFICANT CHANGE UP (ref 32–36)
MCV RBC AUTO: 88.6 FL — SIGNIFICANT CHANGE UP (ref 80–100)
MONOCYTES # BLD AUTO: 0.79 K/UL — SIGNIFICANT CHANGE UP (ref 0–0.9)
MONOCYTES NFR BLD AUTO: 11.3 % — SIGNIFICANT CHANGE UP (ref 2–14)
NEUTROPHILS # BLD AUTO: 5.5 K/UL — SIGNIFICANT CHANGE UP (ref 1.8–7.4)
NEUTROPHILS NFR BLD AUTO: 78.3 % — HIGH (ref 43–77)
NITRITE UR-MCNC: NEGATIVE — SIGNIFICANT CHANGE UP
PH UR: 6.5 — SIGNIFICANT CHANGE UP (ref 5–8)
PLATELET # BLD AUTO: 215 K/UL — SIGNIFICANT CHANGE UP (ref 150–400)
POTASSIUM SERPL-MCNC: 3.8 MMOL/L — SIGNIFICANT CHANGE UP (ref 3.5–5.3)
POTASSIUM SERPL-SCNC: 3.8 MMOL/L — SIGNIFICANT CHANGE UP (ref 3.5–5.3)
PROT SERPL-MCNC: 7.2 G/DL — SIGNIFICANT CHANGE UP (ref 6–8.3)
PROT UR-MCNC: SIGNIFICANT CHANGE UP
PROTHROM AB SERPL-ACNC: 12.3 SEC — SIGNIFICANT CHANGE UP (ref 10–12.9)
RAPID RVP RESULT: DETECTED
RBC # BLD: 3.33 M/UL — LOW (ref 3.8–5.2)
RBC # FLD: 13 % — SIGNIFICANT CHANGE UP (ref 10.3–14.5)
RV+EV RNA SPEC QL NAA+PROBE: SIGNIFICANT CHANGE UP
SODIUM SERPL-SCNC: 134 MMOL/L — LOW (ref 135–145)
SP GR SPEC: 1.02 — SIGNIFICANT CHANGE UP (ref 1.01–1.02)
UROBILINOGEN FLD QL: NEGATIVE — SIGNIFICANT CHANGE UP
WBC # BLD: 7.03 K/UL — SIGNIFICANT CHANGE UP (ref 3.8–10.5)
WBC # FLD AUTO: 7.03 K/UL — SIGNIFICANT CHANGE UP (ref 3.8–10.5)

## 2019-11-27 PROCEDURE — 93010 ELECTROCARDIOGRAM REPORT: CPT

## 2019-11-27 PROCEDURE — 99214 OFFICE O/P EST MOD 30 MIN: CPT | Mod: GC

## 2019-11-27 PROCEDURE — 99285 EMERGENCY DEPT VISIT HI MDM: CPT

## 2019-11-27 PROCEDURE — 99223 1ST HOSP IP/OBS HIGH 75: CPT | Mod: GC

## 2019-11-27 PROCEDURE — 71045 X-RAY EXAM CHEST 1 VIEW: CPT | Mod: 26

## 2019-11-27 RX ORDER — ENOXAPARIN SODIUM 100 MG/ML
40 INJECTION SUBCUTANEOUS DAILY
Refills: 0 | Status: DISCONTINUED | OUTPATIENT
Start: 2019-11-27 | End: 2019-11-28

## 2019-11-27 RX ORDER — LANOLIN ALCOHOL/MO/W.PET/CERES
0 CREAM (GRAM) TOPICAL
Qty: 0 | Refills: 0 | DISCHARGE

## 2019-11-27 RX ORDER — ACETAMINOPHEN 500 MG
650 TABLET ORAL EVERY 6 HOURS
Refills: 0 | Status: DISCONTINUED | OUTPATIENT
Start: 2019-11-27 | End: 2019-11-28

## 2019-11-27 RX ORDER — SODIUM CHLORIDE 9 MG/ML
1550 INJECTION INTRAMUSCULAR; INTRAVENOUS; SUBCUTANEOUS ONCE
Refills: 0 | Status: COMPLETED | OUTPATIENT
Start: 2019-11-27 | End: 2019-11-27

## 2019-11-27 RX ORDER — ACETAMINOPHEN 500 MG
650 TABLET ORAL ONCE
Refills: 0 | Status: COMPLETED | OUTPATIENT
Start: 2019-11-27 | End: 2019-11-27

## 2019-11-27 RX ORDER — CEFTRIAXONE 500 MG/1
1000 INJECTION, POWDER, FOR SOLUTION INTRAMUSCULAR; INTRAVENOUS ONCE
Refills: 0 | Status: COMPLETED | OUTPATIENT
Start: 2019-11-27 | End: 2019-11-27

## 2019-11-27 RX ORDER — CEPHALEXIN 500 MG
250 CAPSULE ORAL DAILY
Refills: 0 | Status: DISCONTINUED | OUTPATIENT
Start: 2019-11-28 | End: 2019-11-28

## 2019-11-27 RX ADMIN — Medication 650 MILLIGRAM(S): at 23:58

## 2019-11-27 RX ADMIN — SODIUM CHLORIDE 1550 MILLILITER(S): 9 INJECTION INTRAMUSCULAR; INTRAVENOUS; SUBCUTANEOUS at 18:25

## 2019-11-27 RX ADMIN — CEFTRIAXONE 100 MILLIGRAM(S): 500 INJECTION, POWDER, FOR SOLUTION INTRAMUSCULAR; INTRAVENOUS at 19:20

## 2019-11-27 NOTE — ED PROVIDER NOTE - NEUROLOGICAL, MLM
Alert and oriented, no focal deficits, no motor or sensory deficits. (+) wrist and elbowed in flexed position chronically, (+) decreased ROM of B/L chronically

## 2019-11-27 NOTE — H&P ADULT - PSY GEN HX ROS MEA POS PC
Mateo Hanley)  Internal Medicine  [address] [place] [address]  Phone: [phone]  Fax: [phone]  Follow Up Time: anxiety

## 2019-11-27 NOTE — ED ADULT NURSE REASSESSMENT NOTE - NS ED NURSE REASSESS COMMENT FT1
Called pharmacy to confirm that ceftriaxone okay to give w/ pts hx of abx allergies. Per pharmacy unable to determine potential rxn based on prior allergies.

## 2019-11-27 NOTE — H&P ADULT - NSICDXPASTMEDICALHX_GEN_ALL_CORE_FT
PAST MEDICAL HISTORY:  CP (cerebral palsy)     Spinal cord injury, thoracic region PAST MEDICAL HISTORY:  CP (cerebral palsy)     Menorrhagia     Spinal cord injury, thoracic region

## 2019-11-27 NOTE — ED ADULT NURSE REASSESSMENT NOTE - NS ED NURSE REASSESS COMMENT FT1
Pt straight cathed by ED RN using sterile technique. Urine specimen sent to lab. Pt straight cathed by ED RN using sterile technique. 300mL clear yellow urine drained from bladder. Urine specimen sent to lab.

## 2019-11-27 NOTE — H&P ADULT - GASTROINTESTINAL DETAILS
nontender/bowel sounds normal/no rigidity/abdomen feels little hard, likely from fibroids/no guarding

## 2019-11-27 NOTE — ED PROVIDER NOTE - ENMT, MLM
Airway patent, Nasal mucosa clear. Mouth with normal mucosa. (+) pharyngeal erythema, no exudate, no LAD, Throat has no vesicles, no oropharyngeal exudates and uvula is midline. Tm WNL B/L

## 2019-11-27 NOTE — H&P ADULT - NSHPPHYSICALEXAM_GEN_ALL_CORE
Vital Signs Last 24 Hrs  T(C): 38 (27 Nov 2019 18:34), Max: 38.3 (27 Nov 2019 18:05)  T(F): 100.4 (27 Nov 2019 18:34), Max: 100.9 (27 Nov 2019 18:05)  HR: 116 (27 Nov 2019 18:34) (115 - 120)  BP: 97/59 (27 Nov 2019 18:34) (97/59 - 110/67)  BP(mean): --  RR: 20 (27 Nov 2019 18:34) (18 - 20)  SpO2: 96% (27 Nov 2019 18:34) (95% - 97%)

## 2019-11-27 NOTE — H&P ADULT - PROBLEM SELECTOR PLAN 3
Will continue straight cath.  Outpatient urine cx grew Ecoli and E cloacae, both sensitive to CTX. Will continue straight cath.  Outpatient urine cx grew Ecoli and E cloacae, both sensitive to CTX.  F/u urine cultures

## 2019-11-27 NOTE — H&P ADULT - PROBLEM SELECTOR PLAN 1
Patient meets sepsis criteria: tachycardic, febrile, borderline BP. with elevated lactate. Likely from URI, given RVP positive for hPMV.  - s/p 1.5 L NS.  - Started on CTX  - F/u urine and blood cxs Patient meets sepsis criteria: tachycardic, febrile, borderline BP. with elevated lactate. Likely from URI, given RVP positive for hPMV.  - s/p 1.5 L NS.  - Given CTX x1  - Will hold off on additional IV antibiotics. Resume home keflex ppx.  - F/u urine and blood cxs Patient meets sepsis criteria: tachycardic, febrile, borderline BP. Likely from URI, given RVP positive for hPMV.  - s/p 1.5 L NS.  - Given CTX x1  - Will hold off on additional IV antibiotics. Resume home keflex ppx.  - F/u urine and blood cxs  - Encourage po hydration. Patient meets sepsis criteria: tachycardic, febrile, borderline BP. Likely from URI, given RVP positive for hPMV.  - s/p 1.5 L NS.  - Given CTX x1  - Will hold off on additional IV antibiotics. Resume home keflex ppx.  - F/u urine and blood cxs  - Encourage po hydration.  - check procalcitonin

## 2019-11-27 NOTE — ED ADULT NURSE REASSESSMENT NOTE - NS ED NURSE REASSESS COMMENT FT1
Pt expressed concern regarding possible reaction to Ceftriaxone due to allergy to Cefdiner. Pharmacy called, states will get back to us. Straight cath being performed.

## 2019-11-27 NOTE — ED ADULT NURSE NOTE - OBJECTIVE STATEMENT
49 year old female presenting to ED by EMS c/o fevers since Saturday. Pt reports headache, dizziness, SOB, chest pain, nausea, abdominal pain, and burning with urination since Saturday. Pt reports left-sided intermittent stabbing abdominal pain 5/10. Pt reports taking 1000mg Tylenol at 1630 in doctor's office for pain and fever with minor relief. PMH includes IBS with diarrhea and orthostatic hypotension. Pt denies vomiting, difficulty urinating, diarrhea or constipation, numbness or tingling. EKG done at triage. 49 year old female presenting to ED by EMS from PMD office c/o fevers since Saturday. Pt reports throat pain, headache, dizziness, SOB, chest pain, nausea, abdominal pain, and burning with urination since Saturday. Pt reports left-sided intermittent stabbing abdominal pain 5/10. Pt reports taking 1000mg Tylenol at 1630 in doctor's office for pain and fever with minor relief. PMH includes CP (bedbound), IBS with diarrhea and orthostatic hypotension. Hx of neurogenic bladder for which home RN straight caths pt daily. Pt denies vomiting, difficulty urinating, diarrhea or constipation, numbness or tingling.

## 2019-11-27 NOTE — H&P ADULT - PROBLEM SELECTOR PLAN 6
1.  Name of PCP:  Dr. Williams Holm  2.  PCP Contacted on Admission: [ ] Y    [x] N    3.  PCP contacted at Discharge: [ ] Y    [ ] N    [ ] N/A  4.  Post-Discharge Appointment Date and Location:  5.  Summary of Handoff given to PCP:

## 2019-11-27 NOTE — H&P ADULT - ASSESSMENT
49 F with cerebral palsy, uterine fibroids with menorrhagia, anemia, cervical neuralgia, chronic urinary incontinence (daily straight cath) with recurrent UTIs, vaginitis, and BV, hyperprolactinemia, subclinical hypothyroidism, presents to ED with cough, sore throat, admitted with sepsis likely from URI (hMPV),

## 2019-11-27 NOTE — ED ADULT NURSE NOTE - VOIDING
without difficulty/burning with urination 300 mL drained with straight cath/without difficulty/burning with urination

## 2019-11-27 NOTE — ED ADULT NURSE REASSESSMENT NOTE - NS ED NURSE REASSESS COMMENT FT1
Report received from RN Criselda ESTRADA & Kimberli JACOBS. Pt resting comfortably with aide at bedside. Awaiting lab results/admission. Pt tachycardic and febrile, recently given tylenol and IVF, will reassess for improvement. Safety maintained at all times, bed in lowest position, call bell in reach. Will continue to monitor closely.

## 2019-11-27 NOTE — ED PROVIDER NOTE - CLINICAL SUMMARY MEDICAL DECISION MAKING FREE TEXT BOX
49 y.o female with PMHx Cerebral Palsy, cervical stenosis sent by PCP today due to fevers x 3 days. pt notes she has had sore throat, B/L ear pain, and coughing. pt notes she takes keflex on a daily basis for chronic UTI. notes she gets a straight cath for urine daily. pt notes hx of weakness to b/l UE and LE 2/2 cerebral palsy. pt denies chest pain, SOB, back pain, vomiting, abdominal pain, or any other complaints. Alert ill appearing no cyanosis TONI EOM Throat congestion+ No cx adenopathy Lungs fair air entry no rales abdomen soft non tender Pt has limited neck movement active atrophy of arms and legs muscles and marked residual weakness lower extremity>Upper extremities Sensations in torso and extremities altered unable to stand or ambulate Labs cxr iv fluids re eval --Zambrano

## 2019-11-27 NOTE — ED PROVIDER NOTE - ATTENDING CONTRIBUTION TO CARE
I have seen and evaluated this patient with the advance practice clinician.   I agree with the findings  unless other wise stated. I have amended notes where needed.  After my face to face bedside evaluation, I am not[age] y.o female with PMHx Cerebral Palsy, cervical stenosis sent by PCP today due to fevers x 3 days. pt notes she has had sore throat, B/L ear pain, and coughing. pt notes she takes keflex on a daily basis for chronic UTI. notes she gets a straight cath for urine daily. pt notes hx of weakness to b/l UE and LE 2/2 cerebral palsy. pt denies chest pain, SOB, back pain, vomiting, abdominal pain, or any other complaints. Alert ill appearing no cyanosis TONI EOM Throat congestion+ No cx adenopathy Lungs fair air entry no rales abdomen soft non tender Pt has limited neck movement active atrophy of arms and legs muscles and marked residual weakness lower extremity>Upper extremities Sensations in torso and extremities altered unable to stand or ambulate Labs cxr iv fluids re eval --Zambrano

## 2019-11-27 NOTE — H&P ADULT - NSHPLABSRESULTS_GEN_ALL_CORE
CBC Full  -  ( 27 Nov 2019 19:00 )  WBC Count : 7.03 K/uL  RBC Count : 3.33 M/uL  Hemoglobin : 10.0 g/dL  Hematocrit : 29.5 %  Platelet Count - Automated : 215 K/uL  Mean Cell Volume : 88.6 fl  Mean Cell Hemoglobin : 30.0 pg  Mean Cell Hemoglobin Concentration : 33.9 gm/dL  Auto Neutrophil # : 5.50 K/uL  Auto Lymphocyte # : 0.73 K/uL  Auto Monocyte # : 0.79 K/uL  Auto Eosinophil # : 0.00 K/uL  Auto Basophil # : 0.00 K/uL  Auto Neutrophil % : 78.3 %  Auto Lymphocyte % : 10.4 %  Auto Monocyte % : 11.3 %  Auto Eosinophil % : 0.0 %  Auto Basophil % : 0.0 %    11-27    134<L>  |  98  |  7   ----------------------------<  114<H>  3.8   |  23  |  <0.30<L>    Ca    9.2      27 Nov 2019 19:00    TPro  7.2  /  Alb  3.9  /  TBili  0.3  /  DBili  x   /  AST  16  /  ALT  15  /  AlkPhos  55  11-27 CBC Full  -  ( 27 Nov 2019 19:00 )  WBC Count : 7.03 K/uL  RBC Count : 3.33 M/uL  Hemoglobin : 10.0 g/dL  Hematocrit : 29.5 %  Platelet Count - Automated : 215 K/uL  Mean Cell Volume : 88.6 fl  Mean Cell Hemoglobin : 30.0 pg  Mean Cell Hemoglobin Concentration : 33.9 gm/dL  Auto Neutrophil # : 5.50 K/uL  Auto Lymphocyte # : 0.73 K/uL  Auto Monocyte # : 0.79 K/uL  Auto Eosinophil # : 0.00 K/uL  Auto Basophil # : 0.00 K/uL  Auto Neutrophil % : 78.3 %  Auto Lymphocyte % : 10.4 %  Auto Monocyte % : 11.3 %  Auto Eosinophil % : 0.0 %  Auto Basophil % : 0.0 %    11-27    134<L>  |  98  |  7   ----------------------------<  114<H>  3.8   |  23  |  <0.30<L>    Ca    9.2      27 Nov 2019 19:00    TPro  7.2  /  Alb  3.9  /  TBili  0.3  /  DBili  x   /  AST  16  /  ALT  15  /  AlkPhos  55  11-27    CXR: clear lungs Labs reviewed   CBC Full  -  ( 27 Nov 2019 19:00 )  WBC Count : 7.03 K/uL  RBC Count : 3.33 M/uL  Hemoglobin : 10.0 g/dL  Hematocrit : 29.5 %  Platelet Count - Automated : 215 K/uL  Mean Cell Volume : 88.6 fl  Mean Cell Hemoglobin : 30.0 pg  Mean Cell Hemoglobin Concentration : 33.9 gm/dL  Auto Neutrophil # : 5.50 K/uL  Auto Lymphocyte # : 0.73 K/uL  Auto Monocyte # : 0.79 K/uL  Auto Eosinophil # : 0.00 K/uL  Auto Basophil # : 0.00 K/uL  Auto Neutrophil % : 78.3 %  Auto Lymphocyte % : 10.4 %  Auto Monocyte % : 11.3 %  Auto Eosinophil % : 0.0 %  Auto Basophil % : 0.0 %    11-27    134<L>  |  98  |  7   ----------------------------<  114<H>  3.8   |  23  |  <0.30<L>    Ca    9.2      27 Nov 2019 19:00    TPro  7.2  /  Alb  3.9  /  TBili  0.3  /  DBili  x   /  AST  16  /  ALT  15  /  AlkPhos  55  11-27    CXR reviewed: clear lungs    EKG reviewed: sinus tachycardia

## 2019-11-27 NOTE — ED ADULT NURSE NOTE - NSIMPLEMENTINTERV_GEN_ALL_ED
Implemented All Fall Risk Interventions:  [place] to call system. Call bell, personal items and telephone within reach. Instruct patient to call for assistance. Room bathroom lighting operational. Non-slip footwear when patient is off stretcher. Physically safe environment: no spills, clutter or unnecessary equipment. Stretcher in lowest position, wheels locked, appropriate side rails in place. Provide visual cue, wrist band, yellow gown, etc. Monitor gait and stability. Monitor for mental status changes and reorient to person, place, and time. Review medications for side effects contributing to fall risk. Reinforce activity limits and safety measures with patient and family.

## 2019-11-27 NOTE — ED PROVIDER NOTE - OBJECTIVE STATEMENT
49 y.o female with PMHx Cerebral Palsy, cervical stenosis sent by PCP today due to fevers x 3 days. pt notes she has had sore throat, B/L ear pain, and coughing. pt notes she takes keflex on a daily basis for chronic UTI. notes she gets a straight cath for urine daily. pt notes hx of weakness to b/l UE and LE 2/2 cerebral palsy. pt denies chest pain, SOB, back pain, vomiting, abdominal pain, or any other complaints.

## 2019-11-27 NOTE — ED ADULT NURSE REASSESSMENT NOTE - NS ED NURSE REASSESS COMMENT FT1
Vital signs indicate pt septic. Sepsis protocol initiated. RVP performed, pt placed on droplet precaution, family informed regarding isolation protocol. Will continue to monitor.

## 2019-11-27 NOTE — H&P ADULT - HISTORY OF PRESENT ILLNESS
49 y.o female with PMHx Cerebral Palsy, cervical stenosis sent by PCP today due to fevers x 3 days. pt notes she has had sore throat, B/L ear pain, and coughing. pt notes she takes keflex on a daily basis for chronic UTI. notes she gets a straight cath for urine daily. pt notes hx of weakness to b/l UE and LE 2/2 cerebral palsy. pt denies chest pain, SOB, back pain, vomiting, abdominal pain, or any other complaints. 49 F with cerebral palsy, uterine fibroids with menorrhagia, anemia, cervical neuralgia, chronic urinary incontinence (daily straight cath) with recurrent UTIs, vaginitis, and BV, hyperprolactinemia, subclinical hypothyroidism, presents from PCP's office, found to have cough, ear pain, fevers. Patient was also noted to be tachycardic in clinic. States that she received flu vaccine on 11/15 and soon after had a sick contact (HHA, who had URI). Reports SOB, chest tightness for 3 days and sore throat. Rapid strep in clinic was negative. She also has mild dysuria. Takes Keflex prophylaxis for history multiple UTIs, in setting of daily straight cath.    In ED, she was given CTX and 1.5 L of NS. EKG showed sinus tachycardia. RVP was positive for hMPV. 49 F with cerebral palsy, uterine fibroids with menorrhagia, anemia, cervical neuralgia, chronic urinary retention (daily straight cath) with recurrent UTIs, chronic prophylactic abx with keflex, vaginitis, and BV, hyperprolactinemia, subclinical hypothyroidism, presents from PCP's office w/ complaints of cough, sore throat, ear pain, fevers. Patient was also noted to be tachycardic in clinic. States that she received flu vaccine on 11/15 and soon after had a sick contact (HHA, who had URI). Reports SOB, chest tightness for 3 days and sore throat. Rapid strep in clinic was negative. She also has mild dysuria. Takes Keflex prophylaxis for history multiple UTIs, in setting of daily straight cath.    In ED, she was given CTX and 1.5 L of NS. EKG showed sinus tachycardia. RVP was positive for hMPV. 49 F with cerebral palsy, uterine fibroids with menorrhagia, anemia, cervical neuralgia, chronic urinary retention (daily straight cath) with recurrent UTIs, chronic prophylactic abx with keflex, vaginitis, and BV, hyperprolactinemia, subclinical hypothyroidism, presents from PCP's office w/ complaints of cough, sore throat, ear pain, fevers. Patient was also noted to be tachycardic in clinic. States reports that she received flu vaccine on 11/15 and about 5-6 days after she had a sick contact (HHA, who had URI). Since then she reports SOB, chest tightness, runny nose along with cough.  She also had  3 days of fever and sore throat. Rapid strep in clinic was negative. She also has mild dysuria. Takes Keflex prophylaxis for last 7 years for history multiple UTIs, in setting of daily straight cath.    In ED, she was given CTX and 1.5 L of NS. EKG showed sinus tachycardia. RVP was positive for hMPV.

## 2019-11-27 NOTE — H&P ADULT - PROBLEM SELECTOR PLAN 2
Likely viral URI. CXR showed clear lungs.   - Will continue antibiotics for 24 hours, then consider de-escalation. Likely viral URI. CXR showed clear lungs.   - Will d/c IV antibiotics and resume home Keflex ppx dose. Likely viral URI. CXR showed clear lungs.   - Will d/c IV antibiotics and resume home Keflex ppx dose.  - c/w yoana macdonald

## 2019-11-28 ENCOUNTER — TRANSCRIPTION ENCOUNTER (OUTPATIENT)
Age: 49
End: 2019-11-28

## 2019-11-28 VITALS
HEART RATE: 103 BPM | RESPIRATION RATE: 18 BRPM | OXYGEN SATURATION: 96 % | TEMPERATURE: 100 F | DIASTOLIC BLOOD PRESSURE: 68 MMHG | SYSTOLIC BLOOD PRESSURE: 99 MMHG

## 2019-11-28 DIAGNOSIS — Z02.9 ENCOUNTER FOR ADMINISTRATIVE EXAMINATIONS, UNSPECIFIED: ICD-10-CM

## 2019-11-28 LAB
ANION GAP SERPL CALC-SCNC: 11 MMOL/L — SIGNIFICANT CHANGE UP (ref 5–17)
APPEARANCE UR: CLEAR — SIGNIFICANT CHANGE UP
BILIRUB UR-MCNC: NEGATIVE — SIGNIFICANT CHANGE UP
BUN SERPL-MCNC: 6 MG/DL — LOW (ref 7–23)
CALCIUM SERPL-MCNC: 8.9 MG/DL — SIGNIFICANT CHANGE UP (ref 8.4–10.5)
CHLORIDE SERPL-SCNC: 101 MMOL/L — SIGNIFICANT CHANGE UP (ref 96–108)
CO2 SERPL-SCNC: 24 MMOL/L — SIGNIFICANT CHANGE UP (ref 22–31)
COLOR SPEC: SIGNIFICANT CHANGE UP
CREAT SERPL-MCNC: 0.3 MG/DL — LOW (ref 0.5–1.3)
CULTURE RESULTS: NO GROWTH — SIGNIFICANT CHANGE UP
CULTURE RESULTS: SIGNIFICANT CHANGE UP
DIFF PNL FLD: NEGATIVE — SIGNIFICANT CHANGE UP
GLUCOSE SERPL-MCNC: 87 MG/DL — SIGNIFICANT CHANGE UP (ref 70–99)
GLUCOSE UR QL: NEGATIVE — SIGNIFICANT CHANGE UP
HCT VFR BLD CALC: 28.8 % — LOW (ref 34.5–45)
HGB BLD-MCNC: 9.4 G/DL — LOW (ref 11.5–15.5)
KETONES UR-MCNC: ABNORMAL
LEUKOCYTE ESTERASE UR-ACNC: NEGATIVE — SIGNIFICANT CHANGE UP
MAGNESIUM SERPL-MCNC: 2.1 MG/DL — SIGNIFICANT CHANGE UP (ref 1.6–2.6)
MCHC RBC-ENTMCNC: 29.7 PG — SIGNIFICANT CHANGE UP (ref 27–34)
MCHC RBC-ENTMCNC: 32.6 GM/DL — SIGNIFICANT CHANGE UP (ref 32–36)
MCV RBC AUTO: 91.1 FL — SIGNIFICANT CHANGE UP (ref 80–100)
NITRITE UR-MCNC: NEGATIVE — SIGNIFICANT CHANGE UP
PH UR: 6 — SIGNIFICANT CHANGE UP (ref 5–8)
PHOSPHATE SERPL-MCNC: 2.7 MG/DL — SIGNIFICANT CHANGE UP (ref 2.5–4.5)
PLATELET # BLD AUTO: 207 K/UL — SIGNIFICANT CHANGE UP (ref 150–400)
POTASSIUM SERPL-MCNC: 3.4 MMOL/L — LOW (ref 3.5–5.3)
POTASSIUM SERPL-SCNC: 3.4 MMOL/L — LOW (ref 3.5–5.3)
PROCALCITONIN SERPL-MCNC: 0.05 NG/ML — SIGNIFICANT CHANGE UP (ref 0.02–0.1)
PROT UR-MCNC: NEGATIVE — SIGNIFICANT CHANGE UP
RBC # BLD: 3.16 M/UL — LOW (ref 3.8–5.2)
RBC # FLD: 13.2 % — SIGNIFICANT CHANGE UP (ref 10.3–14.5)
SODIUM SERPL-SCNC: 136 MMOL/L — SIGNIFICANT CHANGE UP (ref 135–145)
SP GR SPEC: 1.01 — SIGNIFICANT CHANGE UP (ref 1.01–1.02)
SPECIMEN SOURCE: SIGNIFICANT CHANGE UP
SPECIMEN SOURCE: SIGNIFICANT CHANGE UP
UROBILINOGEN FLD QL: SIGNIFICANT CHANGE UP
WBC # BLD: 5.66 K/UL — SIGNIFICANT CHANGE UP (ref 3.8–10.5)
WBC # FLD AUTO: 5.66 K/UL — SIGNIFICANT CHANGE UP (ref 3.8–10.5)

## 2019-11-28 PROCEDURE — 87633 RESP VIRUS 12-25 TARGETS: CPT

## 2019-11-28 PROCEDURE — 83735 ASSAY OF MAGNESIUM: CPT

## 2019-11-28 PROCEDURE — 82330 ASSAY OF CALCIUM: CPT

## 2019-11-28 PROCEDURE — 87581 M.PNEUMON DNA AMP PROBE: CPT

## 2019-11-28 PROCEDURE — 84484 ASSAY OF TROPONIN QUANT: CPT

## 2019-11-28 PROCEDURE — 82803 BLOOD GASES ANY COMBINATION: CPT

## 2019-11-28 PROCEDURE — 93005 ELECTROCARDIOGRAM TRACING: CPT | Mod: XU

## 2019-11-28 PROCEDURE — 85730 THROMBOPLASTIN TIME PARTIAL: CPT

## 2019-11-28 PROCEDURE — 87486 CHLMYD PNEUM DNA AMP PROBE: CPT

## 2019-11-28 PROCEDURE — 87798 DETECT AGENT NOS DNA AMP: CPT

## 2019-11-28 PROCEDURE — 80048 BASIC METABOLIC PNL TOTAL CA: CPT

## 2019-11-28 PROCEDURE — 85610 PROTHROMBIN TIME: CPT

## 2019-11-28 PROCEDURE — 84100 ASSAY OF PHOSPHORUS: CPT

## 2019-11-28 PROCEDURE — 84132 ASSAY OF SERUM POTASSIUM: CPT

## 2019-11-28 PROCEDURE — 84295 ASSAY OF SERUM SODIUM: CPT

## 2019-11-28 PROCEDURE — 82947 ASSAY GLUCOSE BLOOD QUANT: CPT

## 2019-11-28 PROCEDURE — 84145 PROCALCITONIN (PCT): CPT

## 2019-11-28 PROCEDURE — 81003 URINALYSIS AUTO W/O SCOPE: CPT

## 2019-11-28 PROCEDURE — 85014 HEMATOCRIT: CPT

## 2019-11-28 PROCEDURE — 82435 ASSAY OF BLOOD CHLORIDE: CPT

## 2019-11-28 PROCEDURE — 85027 COMPLETE CBC AUTOMATED: CPT

## 2019-11-28 PROCEDURE — 99239 HOSP IP/OBS DSCHRG MGMT >30: CPT

## 2019-11-28 PROCEDURE — 83605 ASSAY OF LACTIC ACID: CPT

## 2019-11-28 PROCEDURE — 99285 EMERGENCY DEPT VISIT HI MDM: CPT | Mod: 25

## 2019-11-28 PROCEDURE — 87040 BLOOD CULTURE FOR BACTERIA: CPT

## 2019-11-28 PROCEDURE — G0463: CPT

## 2019-11-28 PROCEDURE — 84702 CHORIONIC GONADOTROPIN TEST: CPT

## 2019-11-28 PROCEDURE — 87086 URINE CULTURE/COLONY COUNT: CPT

## 2019-11-28 PROCEDURE — 80053 COMPREHEN METABOLIC PANEL: CPT

## 2019-11-28 PROCEDURE — 96374 THER/PROPH/DIAG INJ IV PUSH: CPT | Mod: XU

## 2019-11-28 PROCEDURE — 71045 X-RAY EXAM CHEST 1 VIEW: CPT

## 2019-11-28 PROCEDURE — 51701 INSERT BLADDER CATHETER: CPT

## 2019-11-28 RX ORDER — IPRATROPIUM/ALBUTEROL SULFATE 18-103MCG
3 AEROSOL WITH ADAPTER (GRAM) INHALATION EVERY 6 HOURS
Refills: 0 | Status: DISCONTINUED | OUTPATIENT
Start: 2019-11-28 | End: 2019-11-28

## 2019-11-28 RX ORDER — SODIUM CHLORIDE 9 MG/ML
1000 INJECTION, SOLUTION INTRAVENOUS
Refills: 0 | Status: DISCONTINUED | OUTPATIENT
Start: 2019-11-28 | End: 2019-11-28

## 2019-11-28 RX ORDER — POTASSIUM CHLORIDE 20 MEQ
40 PACKET (EA) ORAL EVERY 4 HOURS
Refills: 0 | Status: COMPLETED | OUTPATIENT
Start: 2019-11-28 | End: 2019-11-28

## 2019-11-28 RX ADMIN — Medication 100 MILLIGRAM(S): at 09:09

## 2019-11-28 RX ADMIN — Medication 250 MILLIGRAM(S): at 12:29

## 2019-11-28 RX ADMIN — SODIUM CHLORIDE 75 MILLILITER(S): 9 INJECTION, SOLUTION INTRAVENOUS at 12:29

## 2019-11-28 RX ADMIN — Medication 40 MILLIEQUIVALENT(S): at 12:30

## 2019-11-28 RX ADMIN — Medication 40 MILLIEQUIVALENT(S): at 09:09

## 2019-11-28 NOTE — DISCHARGE NOTE PROVIDER - NSDCMRMEDTOKEN_GEN_ALL_CORE_FT
d-mannose:   Keflex 250 mg oral capsule: 1 cap(s) orally once a day  l-lysine 500mg:   Oxistat 1% topical lotion: Apply topically to affected area once a day  Tylenol 500 mg oral tablet: 2 tab(s) orally every 6 hours, As Needed  Vitamin B12 500 mcg oral tablet:   Vitamin C 1000 mg oral tablet:   Vitamin D3 5000 intl units oral tablet:   Zinc 50 mg Pink: benzonatate 100 mg oral capsule: 1 cap(s) orally every 8 hours, As needed, Cough  d-mannose:   Keflex 250 mg oral capsule: 1 cap(s) orally once a day  l-lysine 500mg:   Oxistat 1% topical lotion: Apply topically to affected area once a day  Tylenol 500 mg oral tablet: 2 tab(s) orally every 6 hours, As Needed  Vitamin B12 500 mcg oral tablet:   Vitamin C 1000 mg oral tablet:   Vitamin D3 5000 intl units oral tablet:   Zinc 50 mg Pink:

## 2019-11-28 NOTE — DISCHARGE NOTE PROVIDER - NSDCCPCAREPLAN_GEN_ALL_CORE_FT
PRINCIPAL DISCHARGE DIAGNOSIS  Diagnosis: Infection due to human metapneumovirus (hMPV)  Assessment and Plan of Treatment: You were found to have an infection with a virus called human metapneumvirus, which is part of a respiratory vinal panel test you received. This virus may explain some of your symptoms. You were taken off of IV antibiotics because your symptoms are most likely due to a viral infection. You were still given your oral keflex to treat the chronic UTI. Please follow-up with your PCP, Dr. Holm regarding further management of your care.      SECONDARY DISCHARGE DIAGNOSES  Diagnosis: Chronic UTI  Assessment and Plan of Treatment: You were found to have a UTI in your UA, which has been a chronic condition for you. You have been taking an oral antibiotic called keflex  for these symptoms for many years. please continue taking this medication in the outpatient setting and follow up with your primary care doctor regarding adjustment in your medications. PRINCIPAL DISCHARGE DIAGNOSIS  Diagnosis: Infection due to human metapneumovirus (hMPV)  Assessment and Plan of Treatment: You were found to have an infection with a virus called human metapneumvirus, which is part of a respiratory vinal panel test you received. This virus may explain some of your symptoms. You were taken off of IV antibiotics because your symptoms are most likely due to a viral infection. Please continue to take tyleneol for pain and fever relief and robitussin for cough relief. If you feel worsening shortness of breath or develop chest pain return to the hospital for further care. You were still given your oral keflex to treat the chronic UTI. Please follow-up with your PCP, Dr. Holm regarding further management of your care.      SECONDARY DISCHARGE DIAGNOSES  Diagnosis: Chronic UTI  Assessment and Plan of Treatment: You were found to have a UTI in your UA, which has been a chronic condition for you. You have been taking an oral antibiotic called keflex  for these symptoms for many years. please continue taking this medication in the outpatient setting and follow up with your primary care doctor regarding adjustment in your medications.

## 2019-11-28 NOTE — DISCHARGE NOTE NURSING/CASE MANAGEMENT/SOCIAL WORK - PATIENT PORTAL LINK FT
You can access the FollowMyHealth Patient Portal offered by [facility] by registering at the following website: [email or web address]. By joining [employer]’s FollowMyHealth portal, you will also be able to view your health information using other applications (apps) compatible with our system.

## 2019-11-28 NOTE — PROGRESS NOTE ADULT - ATTENDING COMMENTS
See, examined the patient  Sitting in chair, feels much better, remains Tmax 100.1F, SBP , -106, congested, no wheeze  - Reviewed labs, imaging. Normal WBC, UA, CXR clear  - d/c anbx, f/u blood c/s, BP closely  - d/c planning in progress if she remains stable. See, examined the patient  Sitting in chair, feels much better, remains Tmax 100.1F, SBP , -106, congested, no wheeze  - Reviewed labs, imaging. Normal WBC, UA, CXR clear  - d/c anbx, f/u blood c/s, BP closely  - d/c planning today with home care as patient requested   - d/c time 43 min

## 2019-11-28 NOTE — PROGRESS NOTE ADULT - PROBLEM SELECTOR PLAN 1
Patient meets sepsis criteria: tachycardic, febrile, borderline BP. Likely from URI, given RVP positive for hPMV.  - s/p 1.5 L NS.  - Given CTX x1  - Will hold off on additional IV antibiotics. Resume home keflex ppx.  - F/u urine and blood cxs  - Encourage po hydration.  - check procalcitonin Patient meets sepsis criteria: tachycardic, febrile, borderline BP. Likely from URI, given RVP positive for hPMV.  - currently resolved, still tachycardic, no fevers  - s/p 1.5 L NS. will give additional tody  - Given CTX x1, monitor off abx  - Resume home keflex ppx.  - F/u urine and blood cxs  - Encourage po hydration.  - check procalcitonin

## 2019-11-28 NOTE — DISCHARGE NOTE PROVIDER - NSDCFUSCHEDAPPT_GEN_ALL_CORE_FT
MELANI BROTHERS ; 12/11/2019 ; NPP Med  Comm MELANI Garcia ; 12/12/2019 ; [facility] 12/13/2019 ; NPP Med Int [address] [facility]  MELANI BROTHERS ; 12/20/2019 ; [facility] [facility] 01/09/2020 ; NPP Rad  Opd [facility] MELANI BROTHERS ; 12/11/2019 ; NPP Med  Comm MELANI Garcia ; 12/12/2019 ; [facility] 12/13/2019 ; NPP Med Int [address] [facility]  MELANI BROTHERS ; 12/20/2019 ; [facility] 01/09/2020 ; NPP Rad  Op[facility] MELANI BROTHERS ; 12/11/2019 ; NPP Med  Comm MELANI Garcia ; 12/12/2019 ; [facility] 12/13/2019 ; NPP Med Int [address] [facility]  MELANI BROTHERS ; 12/20/2019 ; [facility] ; 01/09/2020 ; NPP Rad  Opd [facility] MELANI BROTHERS ; 12/11/2019 ; NPP Med  Comm MELANI Garcia ; 12/12/2019 ; [facility] ; 12/13/2019 ; NPP Med Int [address] [facility]  MELANI BROTHERS ; 12/20/2019 ; [facility] ; 01/09/2020 ; NPP Rad  Opd [facility] MELANI BROTHERS ; 12/11/2019 ; NPP Med  Comm MELANI Garcia ; 12/12/2019 ; [facility] 12/13/2019 ; NPP Med Int [address] [facility]  MELAIN BROTHERS ; 12/20/2019 ; [facility] [facility] 01/09/2020 ; NPP Rad  Opd [facility] MELANI BROTHERS ; 12/11/2019 ; NPP Med  Comm MELANI Garcia ; 12/12/2019 ; [facility]hthal [facility] ; 12/13/2019 ; NPP Med Int [address] Op[address] [facility]  MELANI BROTHERS ; 12/20/2019 ; [facility]PP [facility]Med [facility] ; 01/09/2020 ; NPP Rad  Opd [facility] MELANI BROTHERS ; 12/11/2019 ; NPP Med  Comm MELANI Garcia ; 12/12/2019 ; [facility]hthal [facility] ; 12/13/2019 ; NPP Med Int 865 Opd [facility]  MELANI BROTHERS ; 12/20/2019 ; [facility]PP PhysMed [facility] ; 01/09/2020 ; NPP Rad  Opd [facility]

## 2019-11-28 NOTE — DISCHARGE NOTE PROVIDER - CARE PROVIDER_API CALL
Williams Holm)  Internal Medicine  [address] [facility] [address] [facility] [address] [facility], [facility] [address]  [place] [address]  Phone: [phone]  Fax: [phone]  Established Patient  Follow Up Time: 1 week

## 2019-11-28 NOTE — PROGRESS NOTE ADULT - PROBLEM SELECTOR PLAN 6
1.  Name of PCP:  Dr. Williams Holm  2.  PCP Contacted on Admission: [ ] Y    [x] N    3.  PCP contacted at Discharge: [ ] Y    [ ] N    [ ] N/A  4.  Post-Discharge Appointment Date and Location:  5.  Summary of Handoff given to PCP: 1.  Name of PCP:  Dr. Williams Holm  2.  PCP Contacted on Admission: [ ] Y    [x] N    3.  PCP contacted at Discharge: [X] Y    [ ] N    [ ] N/A  4.  Post-Discharge Appointment Date and Location: [address] [place] [address], within 1 week  5.  Summary of Handoff given to PCP: yes

## 2019-11-28 NOTE — PROGRESS NOTE ADULT - PROBLEM SELECTOR PLAN 2
Likely viral URI. CXR showed clear lungs.   - Will d/c IV antibiotics and resume home Keflex ppx dose.  - c/w yoana macdonald

## 2019-11-28 NOTE — PROGRESS NOTE ADULT - SUBJECTIVE AND OBJECTIVE BOX
DENEENMELANI  49y  Female    Complaints:  Subjective:     Pt seen sitting up in her wheelchair. Reports some low-grade temperature last night 99.3, and continued coughs. Expressing wishes to go home today. Otherwise denies chills, chest p/p, sob, n/v/d.     Vital Signs Last 24 Hrs  T(C): 37.4 (28 Nov 2019 05:40), Max: 38.3 (27 Nov 2019 18:05)  T(F): 99.3 (28 Nov 2019 05:40), Max: 100.9 (27 Nov 2019 18:05)  HR: 103 (28 Nov 2019 05:40) (103 - 120)  BP: 116/73 (28 Nov 2019 05:40) (97/59 - 116/73)  BP(mean): --  RR: 18 (28 Nov 2019 05:40) (18 - 20)  SpO2: 94% (28 Nov 2019 05:40) (94% - 97%)    PHYSICAL EXAM:  GENERAL: NAD, well-developed  HEENT - NC/AT, pupils equal and reactive to light,  ; Moist mucous membranes, Good dentition, No lesions  NECK: Supple, No JVD  CHEST/LUNG: Clear to auscultation bilaterally; No rales, rhonchi, wheezing  HEART: Regular rate and rhythm; No murmurs, rubs, or gallops  ABDOMEN: Soft, Nontender, Nondistended; Bowel sounds present  EXTREMITIES:  2+ Peripheral Pulses, No clubbing, cyanosis, or edema, sensation intact  NEURO:  No Focal deficits, sensory and motor intact  SKIN: No rashes or lesions    Consultant(s) Notes Reviewed:  [x ] YES  [ ] NO  Care Discussed with Consultants/Other Providers [ x] YES  [ ] NO    LABS:        RADIOLOGY & ADDITIONAL TESTS:    Imaging Personally Reviewed:  [ ] YES  [ ] NO  MedsMEDICATIONS  (STANDING):  cephalexin 250 milliGRAM(s) Oral daily  enoxaparin Injectable 40 milliGRAM(s) SubCutaneous daily    MEDICATIONS  (PRN):  acetaminophen   Tablet .. 650 milliGRAM(s) Oral every 6 hours PRN Temp greater or equal to 38C (100.4F)  albuterol/ipratropium for Nebulization 3 milliLiter(s) Nebulizer every 6 hours PRN Shortness of Breath and/or Wheezing  benzonatate 100 milliGRAM(s) Oral every 8 hours PRN Cough  guaiFENesin    Syrup 100 milliGRAM(s) Oral every 6 hours PRN Cough      HEALTH ISSUES - PROBLEM Dx:  Discharge planning issues: Discharge planning issues  Anemia: Anemia  Neurogenic bladder: Neurogenic bladder  URI, acute: URI, acute  Prophylactic measure: Prophylactic measure  UTI (urinary tract infection): UTI (urinary tract infection)  Sepsis: Sepsis MELANI BROTHERS  49y  Female    Complaints:  Subjective:     Pt seen sitting up in her wheelchair. Reports some low-grade temperature last night 99.3, and continued coughs. Expressing wishes to go home today. Otherwise denies chills, chest p/p, sob, n/v/d.     Vital Signs Last 24 Hrs  T(C): 37.4 (28 Nov 2019 05:40), Max: 38.3 (27 Nov 2019 18:05)  T(F): 99.3 (28 Nov 2019 05:40), Max: 100.9 (27 Nov 2019 18:05)  HR: 103 (28 Nov 2019 05:40) (103 - 120)  BP: 116/73 (28 Nov 2019 05:40) (97/59 - 116/73)  BP(mean): --  RR: 18 (28 Nov 2019 05:40) (18 - 20)  SpO2: 94% (28 Nov 2019 05:40) (94% - 97%)    PHYSICAL EXAM:  GENERAL: NAD, well-developed  HEENT - NC/AT, pupils equal and reactive to light,  ; Moist mucous membranes, Good dentition, No lesions  NECK: Supple, No JVD  CHEST/LUNG: Clear to auscultation bilaterally; No rales, rhonchi, wheezing  HEART: Regular rate and rhythm; No murmurs, rubs, or gallops  ABDOMEN: Soft, Nontender, Nondistended; Bowel sounds present  EXTREMITIES:  2+ Peripheral Pulses, No clubbing, cyanosis, or edema, sensation intact  NEURO:  No Focal deficits, sensory and motor intact  SKIN: No rashes or lesions    Consultant(s) Notes Reviewed:  [x ] YES  [ ] NO  Care Discussed with Consultants/Other Providers [ x] YES  [ ] NO    LABS:                      9.4    5.66  )-----------( 207      ( 28 Nov 2019 09:04 )             28.8     11-28    136  |  101  |  6<L>  ----------------------------<  87  3.4<L>   |  24  |  0.30<L>    Ca    8.9      28 Nov 2019 07:02  Phos  2.7     11-28  Mg     2.1     11-28    TPro  7.2  /  Alb  3.9  /  TBili  0.3  /  DBili  x   /  AST  16  /  ALT  15  /  AlkPhos  55  11-27    proBNP:   Lipid Profile:   HgA1c:   TSH:         RADIOLOGY & ADDITIONAL TESTS:    Imaging Personally Reviewed:  [ ] YES  [ ] NO  MedsMEDICATIONS  (STANDING):  cephalexin 250 milliGRAM(s) Oral daily  enoxaparin Injectable 40 milliGRAM(s) SubCutaneous daily    MEDICATIONS  (PRN):  acetaminophen   Tablet .. 650 milliGRAM(s) Oral every 6 hours PRN Temp greater or equal to 38C (100.4F)  albuterol/ipratropium for Nebulization 3 milliLiter(s) Nebulizer every 6 hours PRN Shortness of Breath and/or Wheezing  benzonatate 100 milliGRAM(s) Oral every 8 hours PRN Cough  guaiFENesin    Syrup 100 milliGRAM(s) Oral every 6 hours PRN Cough      HEALTH ISSUES - PROBLEM Dx:  Discharge planning issues: Discharge planning issues  Anemia: Anemia  Neurogenic bladder: Neurogenic bladder  URI, acute: URI, acute  Prophylactic measure: Prophylactic measure  UTI (urinary tract infection): UTI (urinary tract infection)  Sepsis: Sepsis

## 2019-11-28 NOTE — DISCHARGE NOTE PROVIDER - HOSPITAL COURSE
49 F with cerebral palsy, uterine fibroids with menorrhagia, anemia, cervical neuralgia, chronic urinary retention (daily straight cath) with recurrent UTIs, chronic prophylactic abx with keflex, vaginitis, and BV, hyperprolactinemia, subclinical hypothyroidism, presents from PCP's office w/ complaints of cough, sore throat, ear pain, fevers, septic with tachycardia to 120, fever 103. At the ED, temp 99.7, tachycardic to 120, given CTX and 1.5 L of NS. EKG showed sinus tachycardia. RVP was positive for hMPV, admitted for further management. Pt taken off IV abx, continued keflex for chronic UTI. Symptomatically managed with fluids and Robitussin for coughs. Sepsis resolved, pt hemodynamically stable throughout hospital course. Pt medically optimized for discharge with close follow-up with PCP, Dr. Holm. This is a 49 F with cerebral palsy, uterine fibroids with menorrhagia, anemia, cervical neuralgia, chronic urinary retention (daily straight cath) with recurrent UTIs, chronic prophylactic abx with keflex, vaginitis, and BV, hyperprolactinemia, subclinical hypothyroidism, presents from PCP's office w/ complaints of cough, sore throat, ear pain, low grade fever. At the ED, temp 99.7, She received IV hydration and IV ceftriaxone CTX. EKG showed sinus tachycardia. RVP was positive for hMPV and UA was negative. She was not septic. Pt taken off IV abx, continued her home medicine keflex for chronic UTI. Symptomatically managed with fluids and Robitussin for coughs. She was hemodynamically stable throughout hospital course. Pt medically optimized for discharge with close follow-up with PCP, Dr. Holm.

## 2019-11-28 NOTE — PROGRESS NOTE ADULT - PROBLEM SELECTOR PLAN 3
Will continue straight cath.  Outpatient urine cx grew Ecoli and E cloacae, both sensitive to CTX.  F/u urine cultures

## 2019-12-02 LAB
CULTURE RESULTS: SIGNIFICANT CHANGE UP
CULTURE RESULTS: SIGNIFICANT CHANGE UP
SPECIMEN SOURCE: SIGNIFICANT CHANGE UP
SPECIMEN SOURCE: SIGNIFICANT CHANGE UP

## 2019-12-02 NOTE — ASSESSMENT
[FreeTextEntry1] : 50 yo F with a history of cerebral palsy, uterine fibroids with menorrhagia, anemia, cervical neuralgia, chronic urinary incontinence (daily straight cath) with recurrent UTIs, vaginitis, and BV, hyperprolactinemia, and subclinical hypothyroidism p/w fevers, chills, and sore throat.

## 2019-12-02 NOTE — PHYSICAL EXAM
[No Acute Distress] : no acute distress [Normal Sclera/Conjunctiva] : normal sclera/conjunctiva [PERRL] : pupils equal round and reactive to light [EOMI] : extraocular movements intact [Normal Outer Ear/Nose] : the outer ears and nose were normal in appearance [Normal Oropharynx] : the oropharynx was normal [No JVD] : no jugular venous distention [Supple] : supple [No Respiratory Distress] : no respiratory distress  [No Accessory Muscle Use] : no accessory muscle use [Clear to Auscultation] : lungs were clear to auscultation bilaterally [Regular Rhythm] : with a regular rhythm [Normal S1, S2] : normal S1 and S2 [No Murmur] : no murmur heard [No Edema] : there was no peripheral edema [No Palpable Aorta] : no palpable aorta [No Extremity Clubbing/Cyanosis] : no extremity clubbing/cyanosis [Soft] : abdomen soft [Non Tender] : non-tender [Non-distended] : non-distended [No Masses] : no abdominal mass palpated [No HSM] : no HSM [Normal Bowel Sounds] : normal bowel sounds [No Joint Swelling] : no joint swelling [Normal Affect] : the affect was normal [Normal Insight/Judgement] : insight and judgment were intact [No Rash] : no rash [de-identified] : wheel chair bound. sitting in wheelchair in NAD [de-identified] : tachycardia [de-identified] : mild tender to palpation left submandibular node [de-identified] : Mild CVA tenderness left flank [de-identified] : quadraplegic

## 2019-12-02 NOTE — PLAN
[FreeTextEntry1] : \par #Sepsis given tachycardia (), Febrile (103.1F), and suspected source of infection either respiratory and/or urinary\par -patient was advised to go to the emergency room. Patient understood and EMS was called to transport patient to the ED at [facility]\par \par #Influenza like illness: likely viral\par -advised hydration\par \par #Sore throat: centor criteria 2. \par -rapid strep test negative (expected as patient is on keflex for UTI prophylaxis)\par -Patient will need antibiotics given s/s. Advised to go to the ED. patient understands\par \par #Dysuria w/ CVA tenderness, suspect pyelo.\par -POC UA: negative for nitrites/ leukesterase. \par -Patient will need antibiotics given s/s. Advised to go to the ED. patient understands\par \par d/w Dr. Bustillos

## 2019-12-02 NOTE — REVIEW OF SYSTEMS
[Fever] : fever [Chills] : chills [Night Sweats] : night sweats [Chest Pain] : chest pain [Palpitations] : palpitations [Shortness Of Breath] : shortness of breath [Cough] : cough [Nausea] : nausea [Dysuria] : dysuria [Negative] : Heme/Lymph [Wheezing] : no wheezing [Dyspnea on Exertion] : no dyspnea on exertion

## 2019-12-02 NOTE — HISTORY OF PRESENT ILLNESS
[FreeTextEntry8] : Cc: acute visit for fevers, chills, and sore throat\par 50 yo F with a history of cerebral palsy, uterine fibroids with menorrhagia, anemia, cervical neuralgia, chronic urinary incontinence (daily straight cath) with recurrent UTIs, vaginitis, and BV, hyperprolactinemia, and subclinical hypothyroidism p/w fevers, chills, and sore throat. Patient got her flu shot on 11/15th. She felt sxs on 11/23/19 with sore throat, fevers, and sore throat which got progressively worse. Patient also endorsed CP that worsens with cough. Her cough is productive with green mucus. As well as running nose and eyes. She also had generalized body aches along with her symptoms. TMax at home was 100F after taking Tylenol. She also endorses mild dysuria that started 3 days ago. \par

## 2019-12-09 DIAGNOSIS — J02.9 ACUTE PHARYNGITIS, UNSPECIFIED: ICD-10-CM

## 2019-12-09 DIAGNOSIS — N39.0 URINARY TRACT INFECTION, SITE NOT SPECIFIED: ICD-10-CM

## 2019-12-09 DIAGNOSIS — R65.10 SYSTEMIC INFLAMMATORY RESPONSE SYNDROME (SIRS) OF NON-INFECTIOUS ORIGIN WITHOUT ACUTE ORGAN DYSFUNCTION: ICD-10-CM

## 2019-12-13 ENCOUNTER — APPOINTMENT (OUTPATIENT)
Dept: INTERNAL MEDICINE | Facility: CLINIC | Age: 49
End: 2019-12-13
Payer: MEDICARE

## 2019-12-13 ENCOUNTER — OUTPATIENT (OUTPATIENT)
Dept: OUTPATIENT SERVICES | Facility: HOSPITAL | Age: 49
LOS: 1 days | End: 2019-12-13
Payer: MEDICARE

## 2019-12-13 VITALS
HEIGHT: 59 IN | WEIGHT: 110 LBS | DIASTOLIC BLOOD PRESSURE: 70 MMHG | BODY MASS INDEX: 22.18 KG/M2 | SYSTOLIC BLOOD PRESSURE: 100 MMHG

## 2019-12-13 VITALS — HEART RATE: 72 BPM

## 2019-12-13 DIAGNOSIS — I10 ESSENTIAL (PRIMARY) HYPERTENSION: ICD-10-CM

## 2019-12-13 DIAGNOSIS — Z98.890 OTHER SPECIFIED POSTPROCEDURAL STATES: Chronic | ICD-10-CM

## 2019-12-13 DIAGNOSIS — Z86.19 PERSONAL HISTORY OF OTHER INFECTIOUS AND PARASITIC DISEASES: ICD-10-CM

## 2019-12-13 PROBLEM — N92.0 EXCESSIVE AND FREQUENT MENSTRUATION WITH REGULAR CYCLE: Chronic | Status: ACTIVE | Noted: 2019-11-27

## 2019-12-13 PROCEDURE — G0463: CPT

## 2019-12-13 PROCEDURE — 99214 OFFICE O/P EST MOD 30 MIN: CPT | Mod: GC

## 2019-12-13 RX ORDER — CHLORHEXIDINE GLUCONATE 4 %
325 (65 FE) LIQUID (ML) TOPICAL DAILY
Refills: 0 | Status: DISCONTINUED | COMMUNITY
Start: 2018-07-24 | End: 2019-12-13

## 2019-12-16 NOTE — HISTORY OF PRESENT ILLNESS
[FreeTextEntry1] : annual CPE [de-identified] : 48 y/o F with hx of cerebral palsy, uterine fibroids with menorrhagia, anemia, cervical neuralgia, chronic urinary incontinence (daily straight caths) with recurrent UTIs, vaginitis, and BV, hyperprolactinemia, and subclinical hypothyroidism presenting for her annual CPE. Her last visit on 11/27/2019 was for worsening sore throat, fevers, cough, and runny nose, found to be septic and sent to the ED at [facility]. Patient was found to be positive with hPMV, which likely caused her URI symptoms and sepsis. She was admitted, initially  received antibiotics at the hospital but was stopped when etiology was found to be viral, responded to fluids, and then discharged the next day.\par \par Today, she reports feeling much better, but not at 100%. She notes that she is still fatigued at times and has less strength than her baseline. She also notes having some post-nasal drip that is worse than what she chronically has, which has resulted in a bit of phlegm. She reports having some slight increase in incontinence as well, but does not feel it is from an UTI, especially since the UA was negative for infection during her hospitalization. Her labs during hospitalization showed she was anemic to Hgb 9.4, but she notes that she was having heavy menses around that time. She still feels a little dizziness at times, but it is something she has been dealing with for a long time. She also reports some stomach discomfort at times and her stool being more loose than usual. At baseline, she needs help with ADLs and iADLs, has a home health aide who helps her out 12 hrs/day for 7 days a week. Pt is requesting form to be filled out for renewing home health services.\par \par Denied f/c/n/v, CP, SOB, abdominal pain, constipation, dysuria, hematuria, hematochezia, melena, changes in vision or hearing.

## 2019-12-16 NOTE — REVIEW OF SYSTEMS
[Fatigue] : fatigue [Postnasal Drip] : postnasal drip [Diarrhea] : diarrhea [Muscle Weakness] : muscle weakness [Dizziness] : dizziness [Negative] : Neurological [Fever] : no fever [Chills] : no chills [Hot Flashes] : no hot flashes [Discharge] : no discharge [Pain] : no pain [Vision Problems] : no vision problems [Itching] : no itching [Earache] : no earache [Hearing Loss] : no hearing loss [Sore Throat] : no sore throat [Chest Pain] : no chest pain [Palpitations] : no palpitations [Lower Ext Edema] : no lower extremity edema [Shortness Of Breath] : no shortness of breath [Wheezing] : no wheezing [Cough] : no cough [Abdominal Pain] : no abdominal pain [Nausea] : no nausea [Constipation] : no constipation [Melena] : no melena [Vomiting] : no vomiting [Dysuria] : no dysuria [Incontinence] : no incontinence [Nocturia] : no nocturia [Hematuria] : no hematuria [Frequency] : no frequency [Muscle Pain] : no muscle pain [Back Pain] : no back pain [Skin Rash] : no skin rash [Itching] : no itching [Headache] : no headache [Fainting] : no fainting [Depression] : no depression [FreeTextEntry7] : abdominal discomfort [de-identified] : redness on left elbow from sleeping on it and not being able to re-position in middle of night

## 2019-12-16 NOTE — ASSESSMENT
[FreeTextEntry1] : 48 y/o F with hx of cerebral palsy, uterine fibroids with menorrhagia, anemia, cervical neuralgia, chronic urinary incontinence (daily straight caths) with recurrent UTIs, vaginitis, and BV, hyperprolactinemia, and subclinical hypothyroidism presenting for her annual CPE.\par \par #Cerebral palsy c/b functional quadriplegia - stable\par -continue to use wheelchair\par -has HHA 12 hrs/day for 7 days/week\par -form for renewal of home health services to be filled out and faxed\par -follows with Neurology\par \par #Chronic urinary retention/incontinence - worsened after recent viral URI and septic episode\par -likely 2/2 neurogenic bladder\par -c/w daily straight caths\par -if increased incontinence persists, consider rechecking UA\par \par #Recurrent UTIs - stable\par -c/w Keflex for chronic suppression\par -follows with ID\par \par #Anemia\par -Hgb dropped to 9.4 during recent hospitalization (baseline appears to be Hgb ~12)\par -Pt states drop coincides with heavy menses\par -c/w iron supplementation\par -follows with Hematology\par -advised to return to clinic or go to ED if symptomatic anemia (i.e. dizziness, diaphoresis, SOB, etc.)\par -consider rechecking CBC at next visit or CPE\par \par #Recurrent vaginitis/BV/Vaginal candidiasis\par -c/w Terconazole vaginal  suppositories PRN\par -c/w metronidazole vaginal gel PRN\par \par #Hyperprolactinemia and subclinical hypothyroidism\par -Prolactin level 138 in 11/2019\par -TSH level 4.19 in 11/2019\par -follows with Endocrinology\par \par #Uterine fibroids w/ menorrhagia\par -follows with Ob-Gyn\par \par #HCM\par -Flu vaccine: given on 11/15/2019\par -Td/Tdap vaccine: last in May 2009, pt would like to defer vaccine for another 6 months\par -Depression screening: PHQ-2 score of 0 today\par -Cervical cancer screening: pap smear performed within the past year at Ob-Gyn, as per pt\par -Breast cancer screening: BIRADS 3; bilateral breast US scheduled for Jan 2020, as per pt\par -To be considered at next visit at age 50: Zoster vaccine, Pneumo vaccine, colon cancer screening\par -No labs needed today as patient was recently admitted to the hospital, consider labs next visit\par \par RTC in 6 months for vaccines and f/u, or 1 year for CPE\par d/w Dr. Chakraborty

## 2019-12-16 NOTE — PHYSICAL EXAM
[No Extremity Clubbing/Cyanosis] : no extremity clubbing/cyanosis [Normal] : no CVA or spinal tenderness [Coordination Grossly Intact] : coordination grossly intact [No Rash] : no rash [Speech Grossly Normal] : speech grossly normal [Memory Grossly Normal] : memory grossly normal [Normal Mood] : the mood was normal [Normal Affect] : the affect was normal [Normal Insight/Judgement] : insight and judgment were intact [de-identified] : cerumen in bilateral ear canals, TM clear bilaterally [de-identified] : in wheelchair [de-identified] : 1+ edema in LEs [de-identified] : erythema in L elbow area [de-identified] : contracted UEs, decreased strength throughout (LEs < UEs), in wheelchair at baseline

## 2019-12-26 DIAGNOSIS — E03.9 HYPOTHYROIDISM, UNSPECIFIED: ICD-10-CM

## 2019-12-26 DIAGNOSIS — N39.0 URINARY TRACT INFECTION, SITE NOT SPECIFIED: ICD-10-CM

## 2019-12-26 DIAGNOSIS — E22.1 HYPERPROLACTINEMIA: ICD-10-CM

## 2019-12-26 DIAGNOSIS — B37.3 CANDIDIASIS OF VULVA AND VAGINA: ICD-10-CM

## 2019-12-26 DIAGNOSIS — R53.2 FUNCTIONAL QUADRIPLEGIA: ICD-10-CM

## 2019-12-26 DIAGNOSIS — N31.9 NEUROMUSCULAR DYSFUNCTION OF BLADDER, UNSPECIFIED: ICD-10-CM

## 2019-12-26 DIAGNOSIS — N76.0 ACUTE VAGINITIS: ICD-10-CM

## 2019-12-26 DIAGNOSIS — G80.9 CEREBRAL PALSY, UNSPECIFIED: ICD-10-CM

## 2020-01-01 ENCOUNTER — FORM ENCOUNTER (OUTPATIENT)
Age: 50
End: 2020-01-01

## 2020-01-14 RX ORDER — CHLORHEXIDINE/GLYCERIN/HE-CELL
JELLY (GRAM) TOPICAL
Qty: 1 | Refills: 11 | Status: ACTIVE | OUTPATIENT
Start: 2020-01-14

## 2020-02-04 ENCOUNTER — FORM ENCOUNTER (OUTPATIENT)
Age: 50
End: 2020-02-04

## 2020-02-05 ENCOUNTER — OUTPATIENT (OUTPATIENT)
Dept: OUTPATIENT SERVICES | Facility: HOSPITAL | Age: 50
LOS: 1 days | End: 2020-02-05
Payer: MEDICARE

## 2020-02-05 ENCOUNTER — APPOINTMENT (OUTPATIENT)
Dept: ULTRASOUND IMAGING | Facility: IMAGING CENTER | Age: 50
End: 2020-02-05
Payer: MEDICARE

## 2020-02-05 DIAGNOSIS — N63.0 UNSPECIFIED LUMP IN UNSPECIFIED BREAST: ICD-10-CM

## 2020-02-05 DIAGNOSIS — Z98.890 OTHER SPECIFIED POSTPROCEDURAL STATES: Chronic | ICD-10-CM

## 2020-02-05 PROCEDURE — 76642 ULTRASOUND BREAST LIMITED: CPT

## 2020-02-05 PROCEDURE — 76642 ULTRASOUND BREAST LIMITED: CPT | Mod: 26,50

## 2020-02-28 ENCOUNTER — APPOINTMENT (OUTPATIENT)
Dept: PHYSICAL MEDICINE AND REHAB | Facility: CLINIC | Age: 50
End: 2020-02-28
Payer: MEDICARE

## 2020-02-28 PROCEDURE — 99213 OFFICE O/P EST LOW 20 MIN: CPT

## 2020-02-28 NOTE — HISTORY OF PRESENT ILLNESS
[FreeTextEntry1] : patient with mid-back pain=- non-traumatic- started 2 months ago.\par Has not had imaging or therapies. Prefers to avoid medications.\par

## 2020-02-28 NOTE — PHYSICAL EXAM
[Normal] : Oriented to person, place, and time, insight and judgement were intact and the affect was normal [FreeTextEntry1] : + tenderness mid thoracic spine and with palpation of paraspinals [de-identified] : no edema [de-identified] : no respiratory distress [de-identified] : Stable spasticity FFs/EFs/and KFs

## 2020-04-22 ENCOUNTER — APPOINTMENT (OUTPATIENT)
Dept: INFECTIOUS DISEASE | Facility: CLINIC | Age: 50
End: 2020-04-22
Payer: MEDICARE

## 2020-04-22 PROCEDURE — 99442: CPT

## 2020-04-22 NOTE — HISTORY OF PRESENT ILLNESS
[FreeTextEntry1] : 50 y/o female comes for f/u visit. \par \par She is on long term keflex for chronic UTI. No fever.\par \par Staying at home.\par \par Had HMPV infection at the end of November. Also had anemia issues at that tie and taking supplemental iron. \par \par Urine is clear and has no other urinary complaints.  \par

## 2020-04-22 NOTE — ASSESSMENT
[Treatment Education] : treatment education [FreeTextEntry1] : 49-year-old female comes for followup visit for UTI and chronic abx prophylaxis. Her  symptoms are stable overall. \par \par Continue Keflex for chronic prophylaxis. Pt seems to be stable since starting daily prophylaxis.\par \par Keflex renewed.\par \par To have a telehealth visit with endocrine early next month. \par \par F/u with primary medical team for medical issues. \par \par COVID prevention advised - social distancing, washing hands and wearing a mask in public advised.\par \par F/u in 6 months or earlier if needed. [Treatment Adherence] : treatment adherence [Rx Dose / Side Effects] : Rx dose/side effects

## 2020-04-22 NOTE — REASON FOR VISIT
[Verbal consent obtained from patient] : the patient, [unfilled] [Follow-Up: _____] : a [unfilled] follow-up visit

## 2020-05-08 ENCOUNTER — APPOINTMENT (OUTPATIENT)
Dept: ENDOCRINOLOGY | Facility: CLINIC | Age: 50
End: 2020-05-08
Payer: MEDICARE

## 2020-05-08 PROCEDURE — 99442: CPT | Mod: CR

## 2020-06-04 ENCOUNTER — APPOINTMENT (OUTPATIENT)
Dept: OTOLARYNGOLOGY | Facility: CLINIC | Age: 50
End: 2020-06-04

## 2020-06-05 ENCOUNTER — OUTPATIENT (OUTPATIENT)
Dept: OUTPATIENT SERVICES | Facility: HOSPITAL | Age: 50
LOS: 1 days | End: 2020-06-05
Payer: MEDICARE

## 2020-06-05 ENCOUNTER — APPOINTMENT (OUTPATIENT)
Dept: INTERNAL MEDICINE | Facility: CLINIC | Age: 50
End: 2020-06-05
Payer: MEDICARE

## 2020-06-05 DIAGNOSIS — J30.9 ALLERGIC RHINITIS, UNSPECIFIED: ICD-10-CM

## 2020-06-05 DIAGNOSIS — E03.9 HYPOTHYROIDISM, UNSPECIFIED: ICD-10-CM

## 2020-06-05 DIAGNOSIS — I10 ESSENTIAL (PRIMARY) HYPERTENSION: ICD-10-CM

## 2020-06-05 DIAGNOSIS — Z98.890 OTHER SPECIFIED POSTPROCEDURAL STATES: Chronic | ICD-10-CM

## 2020-06-05 DIAGNOSIS — E22.1 HYPERPROLACTINEMIA: ICD-10-CM

## 2020-06-05 DIAGNOSIS — D64.9 ANEMIA, UNSPECIFIED: ICD-10-CM

## 2020-06-05 DIAGNOSIS — N92.0 EXCESSIVE AND FREQUENT MENSTRUATION WITH REGULAR CYCLE: ICD-10-CM

## 2020-06-05 DIAGNOSIS — G80.9 CEREBRAL PALSY, UNSPECIFIED: ICD-10-CM

## 2020-06-05 DIAGNOSIS — R53.2 FUNCTIONAL QUADRIPLEGIA: ICD-10-CM

## 2020-06-05 PROCEDURE — 99213 OFFICE O/P EST LOW 20 MIN: CPT | Mod: GE

## 2020-06-05 NOTE — HISTORY OF PRESENT ILLNESS
[FreeTextEntry1] : follow-up [de-identified] : 49 y/o F with hx of cerebral palsy, uterine fibroids with menorrhagia, anemia, cervical neuralgia, chronic urinary incontinence (daily straight caths) with recurrent UTIs, vaginitis, and BV, hyperprolactinemia, and subclinical hypothyroidism presenting for follow-up. Last visit was in Dec 2019 for her CPE. Patient had hospitalization for hMPV in Nov 2019. Since last visit pt has followed up with Urology, ID, Endocrine, and PM&R. Main concern today is her anemia since her Hgb was down to 9.4 during her hospitalization in Nov 2019. Pt reports having a month-long period in May with only a 6-day break and now having menses again that are very heavy. States that she has noticed that eating berries has made her bleed more. Pt reports feeling dizzy and very weak, but symptoms improve with increased fluid intake. Pt also notes loose BMs for the past 2 days. Reports intermittent abdominal pain as well. Also notes prior that she had sneezing, mild cough, and runny nose that is typical of her allergies, though currently does not have those symptoms. Pt did not take allergy meds. Otherwise pt denies f/c/n/v, CP, SOB, wheezing, loss of taste or smell, dysuria, hematuria, hematochezia, melena, constipation.

## 2020-06-05 NOTE — PHYSICAL EXAM
[de-identified] : Over the phone, speech appeared normal, not in acute distress, no difficulty in completing full sentences, no cough, wheezing or SOB noted

## 2020-06-05 NOTE — ASSESSMENT
[FreeTextEntry1] : 51 y/o F with hx of cerebral palsy, uterine fibroids with menorrhagia, anemia, cervical neuralgia, chronic urinary incontinence (daily straight caths) with recurrent UTIs, vaginitis, and BV, hyperprolactinemia, and subclinical hypothyroidism presenting for follow-up.\par \par #Anemia c/b menorrhagia\par -pt reports dizziness and fatigue in setting of month-long menses and recent heavier menses\par -Other than Hgb of 9.4 during hospitalization in 11/2019, pt has been consistently around baseline Hgb ~11-12\par -At the time of previous drop, pt noted to also have heavy menses\par -c/w iron supplementation, increased to 65mcg PO daily by Endo last month\par -c/w Vit D3 125mcg PO daily; last Vit D 25-hydroxy level wnl in 11/2019\par -follows with Hematology\par -will check CBC and BMP given pt is symptomatic\par \par #Allergic Rhinitis\par -advised to try OTC allergy meds (e.g. Allegra, Zyrtec, or Claritin) if needed\par \par #Diarrhea\par -continue to monitor for now\par -advised to call clinic and speak to a doctor if diarrhea persists/worsens, or if she develops any other new symptoms (particularly fever, cough, SOB)\par \par #Cerebral palsy c/b functional quadriplegia - stable\par -c/w wheelchair-use\par -has HHA 12 hrs/day for 7 days/week\par -follows with Neurology\par \par #Chronic urinary retention/incontinence\par -likely 2/2 neurogenic bladder\par -c/w daily straight caths (has RN who comes once a day to help with cath)\par \par #Recurrent UTIs - stable\par -c/w daily Keflex for ppx/chronic suppression\par -Last UA/UCx in 11/2019 was negative for infection\par -follows with ID\par \par #Recurrent vaginitis/BV/Vaginal candidiasis\par -c/w Terconazole vaginal suppositories PRN\par -c/w metronidazole vaginal gel PRN\par \par #Hyperprolactinemia and subclinical hypothyroidism\par -Prolactin level 138 in 11/2019\par -TSH 4.19 and FT4 1.0 in 11/2019\par -follows with Endocrinology\par -as per Endocrinology note from 5/8/20, hypothyroidism and hyperprolactinemia stable with plans to check TFTs and serum prolactin at next visit; will order these labs for next blood draw\par \par #Uterine fibroids w/ menorrhagia\par -advised to f/u with Ob-Gyn\par \par #HCM\par -Td/Tdap vaccine: last in May 2009, pt previously deferred vaccine, to be considered at next in-clinic visit\par -Depression screening: PHQ-2 score of 0 in 12/2019, repeat at next CPE\par -Cervical cancer screening: pap smear performed within the past year at Ob-Gyn, as per pt; advised to f/u with Ob-Gyn\par -Breast cancer screening: BIRADS 3, stable on Mammogram in 5/2019 and on Breast US in 2/2020; mammogram and breast US to be repeated in August 2020\par -To be considered at next in-clinic visit given age 50: Zoster vaccine, Pneumo vaccine, colon cancer screening\par -Labs ordered: CBC, BMP, TSH w/ FT4, prolactin level, COVID-19 IgG antibody test (as requested by pt)\par \par RTC in 6 months for annual CPE or sooner PRN\par d/w Dr. Marcial

## 2020-06-05 NOTE — REVIEW OF SYSTEMS
[Fever] : no fever [Chills] : no chills [Discharge] : no discharge [Pain] : no pain [Vision Problems] : no vision problems [Earache] : no earache [Hearing Loss] : no hearing loss [Sore Throat] : no sore throat [Shortness Of Breath] : no shortness of breath [Wheezing] : no wheezing [Nausea] : no nausea [Constipation] : no constipation [Vomiting] : no vomiting [Melena] : no melena [Dysuria] : no dysuria [Hematuria] : no hematuria [Joint Pain] : no joint pain [Joint Swelling] : no joint swelling [Muscle Pain] : no muscle pain [Skin Rash] : no skin rash [Headache] : no headache [Fainting] : no fainting [FreeTextEntry2] : fatigue [FreeTextEntry4] : previously had mild runny nose (pt attributes to her typical allergy symptoms) [FreeTextEntry6] : previously had mild cough (pt attributes to her typical allergy symptoms) [FreeTextEntry7] : loose BMs for past 2 days, intermittent abdominal pain [FreeTextEntry8] : chronic urinary retention/incontinence [de-identified] : intermittent skin itching [de-identified] : dizziness

## 2020-06-19 ENCOUNTER — LABORATORY RESULT (OUTPATIENT)
Age: 50
End: 2020-06-19

## 2020-06-19 PROCEDURE — 84146 ASSAY OF PROLACTIN: CPT

## 2020-06-19 PROCEDURE — G0463: CPT

## 2020-06-19 PROCEDURE — 84439 ASSAY OF FREE THYROXINE: CPT

## 2020-06-19 PROCEDURE — 85027 COMPLETE CBC AUTOMATED: CPT

## 2020-06-19 PROCEDURE — 84443 ASSAY THYROID STIM HORMONE: CPT

## 2020-06-19 PROCEDURE — 80048 BASIC METABOLIC PNL TOTAL CA: CPT

## 2020-06-19 PROCEDURE — 86769 SARS-COV-2 COVID-19 ANTIBODY: CPT

## 2020-06-20 LAB
ANION GAP SERPL CALC-SCNC: 15 MMOL/L — SIGNIFICANT CHANGE UP (ref 5–17)
BUN SERPL-MCNC: 18 MG/DL — SIGNIFICANT CHANGE UP (ref 7–23)
CALCIUM SERPL-MCNC: 9.6 MG/DL — SIGNIFICANT CHANGE UP (ref 8.4–10.5)
CHLORIDE SERPL-SCNC: 100 MMOL/L — SIGNIFICANT CHANGE UP (ref 96–108)
CO2 SERPL-SCNC: 21 MMOL/L — LOW (ref 22–31)
CREAT SERPL-MCNC: 0.35 MG/DL — LOW (ref 0.5–1.3)
GLUCOSE SERPL-MCNC: 84 MG/DL — SIGNIFICANT CHANGE UP (ref 70–99)
HCT VFR BLD CALC: 36.1 % — SIGNIFICANT CHANGE UP (ref 34.5–45)
HGB BLD-MCNC: 11.6 G/DL — SIGNIFICANT CHANGE UP (ref 11.5–15.5)
MCHC RBC-ENTMCNC: 30.4 PG — SIGNIFICANT CHANGE UP (ref 27–34)
MCHC RBC-ENTMCNC: 32.1 GM/DL — SIGNIFICANT CHANGE UP (ref 32–36)
MCV RBC AUTO: 94.5 FL — SIGNIFICANT CHANGE UP (ref 80–100)
PLATELET # BLD AUTO: 275 K/UL — SIGNIFICANT CHANGE UP (ref 150–400)
POTASSIUM SERPL-MCNC: 4.5 MMOL/L — SIGNIFICANT CHANGE UP (ref 3.5–5.3)
POTASSIUM SERPL-SCNC: 4.5 MMOL/L — SIGNIFICANT CHANGE UP (ref 3.5–5.3)
PROLACTIN SERPL-MCNC: 200 NG/ML — HIGH (ref 3.4–24.1)
RBC # BLD: 3.82 M/UL — SIGNIFICANT CHANGE UP (ref 3.8–5.2)
RBC # FLD: 12.8 % — SIGNIFICANT CHANGE UP (ref 10.3–14.5)
SARS-COV-2 IGG SERPL QL IA: NEGATIVE — SIGNIFICANT CHANGE UP
SARS-COV-2 IGM SERPL IA-ACNC: 0 INDEX — SIGNIFICANT CHANGE UP
SODIUM SERPL-SCNC: 136 MMOL/L — SIGNIFICANT CHANGE UP (ref 135–145)
T4 FREE SERPL-MCNC: 1 NG/DL — SIGNIFICANT CHANGE UP (ref 0.9–1.8)
TSH SERPL-MCNC: 4.67 UIU/ML — HIGH (ref 0.27–4.2)
WBC # BLD: 6.74 K/UL — SIGNIFICANT CHANGE UP (ref 3.8–10.5)
WBC # FLD AUTO: 6.74 K/UL — SIGNIFICANT CHANGE UP (ref 3.8–10.5)

## 2020-06-22 ENCOUNTER — FORM ENCOUNTER (OUTPATIENT)
Age: 50
End: 2020-06-22

## 2020-07-09 LAB
MONOMERIC PROLACTIN (ICMA)*: 189 NG/ML — HIGH
PERCENT MACROPROLACTIN: 1 % — SIGNIFICANT CHANGE UP
PROLACTIN, SERUM (ICMA)*: 191 NG/ML — HIGH

## 2020-08-14 ENCOUNTER — APPOINTMENT (OUTPATIENT)
Dept: ULTRASOUND IMAGING | Facility: IMAGING CENTER | Age: 50
End: 2020-08-14
Payer: MEDICARE

## 2020-08-14 ENCOUNTER — OUTPATIENT (OUTPATIENT)
Dept: OUTPATIENT SERVICES | Facility: HOSPITAL | Age: 50
LOS: 1 days | End: 2020-08-14
Payer: MEDICARE

## 2020-08-14 ENCOUNTER — RESULT REVIEW (OUTPATIENT)
Age: 50
End: 2020-08-14

## 2020-08-14 ENCOUNTER — APPOINTMENT (OUTPATIENT)
Dept: SURGICAL ONCOLOGY | Facility: CLINIC | Age: 50
End: 2020-08-14
Payer: MEDICARE

## 2020-08-14 ENCOUNTER — APPOINTMENT (OUTPATIENT)
Dept: MAMMOGRAPHY | Facility: IMAGING CENTER | Age: 50
End: 2020-08-14
Payer: MEDICARE

## 2020-08-14 VITALS
OXYGEN SATURATION: 97 % | WEIGHT: 110 LBS | SYSTOLIC BLOOD PRESSURE: 103 MMHG | BODY MASS INDEX: 22.18 KG/M2 | HEART RATE: 77 BPM | DIASTOLIC BLOOD PRESSURE: 68 MMHG | RESPIRATION RATE: 15 BRPM | HEIGHT: 59 IN

## 2020-08-14 DIAGNOSIS — Z00.00 ENCOUNTER FOR GENERAL ADULT MEDICAL EXAMINATION WITHOUT ABNORMAL FINDINGS: ICD-10-CM

## 2020-08-14 DIAGNOSIS — Z98.890 OTHER SPECIFIED POSTPROCEDURAL STATES: Chronic | ICD-10-CM

## 2020-08-14 PROCEDURE — 77063 BREAST TOMOSYNTHESIS BI: CPT

## 2020-08-14 PROCEDURE — 99214 OFFICE O/P EST MOD 30 MIN: CPT

## 2020-08-14 PROCEDURE — 76641 ULTRASOUND BREAST COMPLETE: CPT | Mod: 26,50

## 2020-08-14 PROCEDURE — 77067 SCR MAMMO BI INCL CAD: CPT | Mod: 26

## 2020-08-14 PROCEDURE — 77063 BREAST TOMOSYNTHESIS BI: CPT | Mod: 26

## 2020-08-14 PROCEDURE — 77067 SCR MAMMO BI INCL CAD: CPT

## 2020-08-14 PROCEDURE — 76641 ULTRASOUND BREAST COMPLETE: CPT

## 2020-08-14 NOTE — HISTORY OF PRESENT ILLNESS
[de-identified] : 51 y/o female presents for follow up.\par History multiple bilateral benign US guided needle biopsies (fibrocystic changes and fibroadenomas) \par Denies personal or family history of breast or ovarian cancer\par \par MMG/US 8/14/20 \par BIRADS-2\par Extremely dense breasts.\par No mammographic evidence of malignancy. No significant change.\par No sonographic evidence of malignancy.\par \par US 2/5/20\par BIRADS-3\par At 7:00, 8 cm from the nipple, there is a 0.5 x 0.2 x 0.6 cm circumscribed hypoechoic mass, stable since 05/14/2019. \par At 3:00, 3 cm from the nipple, there is a 0.8 x 0.2 x 3.5 cm probable cyst versus hypoechoic mass, stable since 05/14/2019. \par At 3:00, 4 cm from the nipple, there is a 0.4 x 0.2 x 0.3 cm circumscribed hypoechoic mass, stable since 05/14/2019.\par Stable bilateral circumscribed hypoechoic masses, probably benign, for which six-month ultrasound follow-up in May 2020 at time of annual mammogram is recommended.\par \par Bilateral MMG/US 5/14/19: Multiple bilateral cysts and nodules bilaterally. Right breast 12:00 4 cm fn: newly noted 0.5 cm circumscribed hypoechoic mass. Newly noted circumscribed hypoechoic masses in left breast at 3:00 4 cm fn, 3:00 3 cm fn, and 7:00 8 cm fn. No evidence of malignancy. BIRADS-3. Mammogram/sonogram recommended in one year. \par \par Pt notes she got extremely sick in November, diagnosed with HPMV. \par Denies palpable breast masses, nipple discharge, nipple retraction/inversion, or skin changes. . \par Denies constitutional symptoms. \par Denies fever or chills.  \par Pt has CP and presents in a wheelchair.

## 2020-08-14 NOTE — PHYSICAL EXAM
[Normal] : supple, no neck mass and thyroid not enlarged [Normal Neck Lymph Nodes] : normal neck lymph nodes  [Normal Axillary Lymph Nodes] : normal axillary lymph nodes [Normal Groin Lymph Nodes] : normal groin lymph nodes [Normal Supraclavicular Lymph Nodes] : normal supraclavicular lymph nodes [Normal] : oriented to person, place and time, with appropriate affect [de-identified] : No dominant masses or adenopathy bilaterally.

## 2020-08-14 NOTE — ADDENDUM
[FreeTextEntry1] : I, Vero Jansen, acted soley as a scribe for Dr. Nav Garza on this date 08/14/2020.

## 2020-08-14 NOTE — ASSESSMENT
[FreeTextEntry1] : Multiple bilateral breast nodules\par New nodules bilaterally\par BIRADS-2 US \par Reassured patient index of suspicion for malignancy is low.\par Will get 6 month follow up bilateral breast ultrasound\par RTO 1 year after yearly breast imaging.

## 2020-08-28 DIAGNOSIS — M25.512 PAIN IN LEFT SHOULDER: ICD-10-CM

## 2020-09-03 ENCOUNTER — APPOINTMENT (OUTPATIENT)
Dept: RADIOLOGY | Facility: IMAGING CENTER | Age: 50
End: 2020-09-03
Payer: MEDICARE

## 2020-09-03 ENCOUNTER — RESULT REVIEW (OUTPATIENT)
Age: 50
End: 2020-09-03

## 2020-09-03 ENCOUNTER — APPOINTMENT (OUTPATIENT)
Dept: RADIOLOGY | Facility: IMAGING CENTER | Age: 50
End: 2020-09-03

## 2020-09-03 ENCOUNTER — APPOINTMENT (OUTPATIENT)
Dept: ULTRASOUND IMAGING | Facility: IMAGING CENTER | Age: 50
End: 2020-09-03

## 2020-09-03 ENCOUNTER — APPOINTMENT (OUTPATIENT)
Dept: UROLOGY | Facility: CLINIC | Age: 50
End: 2020-09-03
Payer: MEDICARE

## 2020-09-03 ENCOUNTER — OUTPATIENT (OUTPATIENT)
Dept: OUTPATIENT SERVICES | Facility: HOSPITAL | Age: 50
LOS: 1 days | End: 2020-09-03
Payer: MEDICARE

## 2020-09-03 VITALS — TEMPERATURE: 97.3 F

## 2020-09-03 DIAGNOSIS — M54.9 DORSALGIA, UNSPECIFIED: ICD-10-CM

## 2020-09-03 DIAGNOSIS — N30.20 OTHER CHRONIC CYSTITIS W/OUT HEMATURIA: ICD-10-CM

## 2020-09-03 DIAGNOSIS — Z98.890 OTHER SPECIFIED POSTPROCEDURAL STATES: Chronic | ICD-10-CM

## 2020-09-03 DIAGNOSIS — G95.9 DISEASE OF SPINAL CORD, UNSPECIFIED: ICD-10-CM

## 2020-09-03 PROCEDURE — 72070 X-RAY EXAM THORAC SPINE 2VWS: CPT

## 2020-09-03 PROCEDURE — 72100 X-RAY EXAM L-S SPINE 2/3 VWS: CPT | Mod: 26

## 2020-09-03 PROCEDURE — 72050 X-RAY EXAM NECK SPINE 4/5VWS: CPT | Mod: 26

## 2020-09-03 PROCEDURE — 76856 US EXAM PELVIC COMPLETE: CPT | Mod: 26

## 2020-09-03 PROCEDURE — 73030 X-RAY EXAM OF SHOULDER: CPT

## 2020-09-03 PROCEDURE — 73030 X-RAY EXAM OF SHOULDER: CPT | Mod: 26,LT

## 2020-09-03 PROCEDURE — 72070 X-RAY EXAM THORAC SPINE 2VWS: CPT | Mod: 26

## 2020-09-03 PROCEDURE — 99213 OFFICE O/P EST LOW 20 MIN: CPT

## 2020-09-03 PROCEDURE — 76856 US EXAM PELVIC COMPLETE: CPT

## 2020-09-03 PROCEDURE — 72050 X-RAY EXAM NECK SPINE 4/5VWS: CPT

## 2020-09-03 PROCEDURE — 72100 X-RAY EXAM L-S SPINE 2/3 VWS: CPT

## 2020-09-09 ENCOUNTER — APPOINTMENT (OUTPATIENT)
Dept: OTOLARYNGOLOGY | Facility: CLINIC | Age: 50
End: 2020-09-09
Payer: MEDICARE

## 2020-09-09 VITALS
HEART RATE: 84 BPM | HEIGHT: 59 IN | WEIGHT: 110 LBS | SYSTOLIC BLOOD PRESSURE: 121 MMHG | DIASTOLIC BLOOD PRESSURE: 74 MMHG | BODY MASS INDEX: 22.18 KG/M2 | TEMPERATURE: 98.4 F

## 2020-09-09 DIAGNOSIS — J30.9 ALLERGIC RHINITIS, UNSPECIFIED: ICD-10-CM

## 2020-09-09 PROCEDURE — 69210 REMOVE IMPACTED EAR WAX UNI: CPT

## 2020-09-09 PROCEDURE — 99213 OFFICE O/P EST LOW 20 MIN: CPT | Mod: 25

## 2020-09-09 NOTE — PHYSICAL EXAM
[Nasal Endoscopy Performed] : nasal endoscopy was performed, see procedure section for findings [] : septum deviated bilaterally [Midline] : trachea located in midline position [Normal] : no rashes [de-identified] : congested [de-identified] : bilateral cerumen

## 2020-09-09 NOTE — ASSESSMENT
[FreeTextEntry1] : Wax:\par -Cerumen is removed from the right and left  ear canal with a curette and suction.\par -Rx:Debrox be placed in both ears on a routine basis to keep them free of wax.\par -Routine debridement suggested to keep the ears free of wax.\par \par f/u 6 months or prn

## 2020-09-09 NOTE — HISTORY OF PRESENT ILLNESS
[No] : patient does not have a  history of radiation therapy [Otalgia] : otalgia [Nasal Congestion] : nasal congestion [None] : No risk factors have been identified. [de-identified] : 51 y/o female with hx of severe cerumen impaction presents for routine cleaning. Otherwise feeling well. \par neurologic disorder requiring wheelchair confinement\par no throat complaints\par no modifying factors\par  [Dizziness] : no dizziness [Anxiety] : no anxiety [Recurrent Otitis Media] : no recurrent otitis media [Headache] : no headache [Hearing Loss] : no hearing loss [Presbycusis] : no presbycusis [Otitis Media with Effusion] : no otitis media with effusion [Congenital Ear Malformation] : no congenital ear malformation [Perilymphatic Fistula] : no perilymphatic fistula [Meniere Disease] : no Meniere disease [Otosclerosis] : no otosclerosis [Hypertension] : no hypertension [Loud Noise Exposure] : no history of loud noise exposure [Stroke] : no stroke [Smoking] : no smoking [Early Onset Hearing Loss] : no early onset hearing loss [Facial Pressure] : no facial pressure [Facial Pain] : no facial pain [Diplopia] : no diplopia [Ear Fullness] : no ear fullness [Allergic Rhinitis] : no allergic rhinitis [Maxillary Tooth Infection] : no maxillary tooth infection [Environmental Allergies] : no environmental allergies [Septal Deviation] : no septal deviation [Environmental Allergens] : no environmental allergens [Seasonal Allergies] : no seasonal allergies [Adenoidectomy] : no adenoidectomy [Nasal FB Removal] : no nasal foreign body removal [Asthma] : no asthma [Allergies] : no allergies [Neck Mass] : no neck mass [Neck Pain] : no neck pain [Chills] : no chills [Cold Intolerance] : no cold intolerance [Fatigue] : no fatigue [Cough] : no cough [Heat Intolerance] : no heat intolerance [Hyperthyroidism] : no hyperthyroidism [Hodgkin Disease] : no hodgkin disease [Sialadenitis] : no sialadenitis [Non-Hodgkin Lymphoma] : no non-hodgkin lymphoma [Alcohol Use] : no alcohol use [Graves Disease] : no graves disease [Tobacco Use] : no tobacco use [Thyroid Cancer] : no thyroid cancer

## 2020-09-10 ENCOUNTER — APPOINTMENT (OUTPATIENT)
Dept: OPHTHALMOLOGY | Facility: CLINIC | Age: 50
End: 2020-09-10

## 2020-09-11 ENCOUNTER — APPOINTMENT (OUTPATIENT)
Dept: PHYSICAL MEDICINE AND REHAB | Facility: CLINIC | Age: 50
End: 2020-09-11
Payer: MEDICARE

## 2020-09-11 DIAGNOSIS — M54.9 DORSALGIA, UNSPECIFIED: ICD-10-CM

## 2020-09-11 PROCEDURE — 99213 OFFICE O/P EST LOW 20 MIN: CPT | Mod: 95

## 2020-09-11 NOTE — HISTORY OF PRESENT ILLNESS
[FreeTextEntry1] : Patient continuies to c/o pain.\par X-rays reviewed and discussed with her- cervical spine with multilevel DDD and no fxs; thoracic/lumbar/L shoulder negative.\par Has not had therapies- still would like to hold off due to concern regarding COVID. Prefers to avoid medications - takes tyelenol occasionally.\par Pain is in the neck- radiates to the L arm.\par

## 2020-09-11 NOTE — PHYSICAL EXAM
[Normal] : Alert and in no acute distress [de-identified] : speech clear/fluent and intelligible.

## 2020-09-11 NOTE — REASON FOR VISIT
[Follow-Up] : a follow-up visit [Home] : at home, [unfilled] , at the time of the visit. [Medical Office: ([facility])___] : at the medical office located in

## 2020-09-11 NOTE — ASSESSMENT
[FreeTextEntry1] : - Tyelenol prn- offered muscle relaxant but she prefers to avoid meds.\par - Referred to Dr. Walter for possible US guided evaluation and injection of the L shoulder and trigger points.\par - Advised to use moist heat for neck\par - When she is comfortable in regards to pandemic will resume PT

## 2020-09-17 ENCOUNTER — APPOINTMENT (OUTPATIENT)
Dept: UROLOGY | Facility: CLINIC | Age: 50
End: 2020-09-17

## 2020-09-29 ENCOUNTER — FORM ENCOUNTER (OUTPATIENT)
Age: 50
End: 2020-09-29

## 2020-09-29 ENCOUNTER — RESULT REVIEW (OUTPATIENT)
Age: 50
End: 2020-09-29

## 2020-09-30 ENCOUNTER — APPOINTMENT (OUTPATIENT)
Dept: OBGYN | Facility: CLINIC | Age: 50
End: 2020-09-30
Payer: MEDICARE

## 2020-09-30 ENCOUNTER — FORM ENCOUNTER (OUTPATIENT)
Age: 50
End: 2020-09-30

## 2020-09-30 DIAGNOSIS — Z86.018 PERSONAL HISTORY OF OTHER BENIGN NEOPLASM: ICD-10-CM

## 2020-09-30 DIAGNOSIS — D25.9 LEIOMYOMA OF UTERUS, UNSPECIFIED: ICD-10-CM

## 2020-09-30 PROCEDURE — 99396 PREV VISIT EST AGE 40-64: CPT | Mod: GY

## 2020-10-02 ENCOUNTER — OUTPATIENT (OUTPATIENT)
Dept: OUTPATIENT SERVICES | Facility: HOSPITAL | Age: 50
LOS: 1 days | End: 2020-10-02
Payer: MEDICARE

## 2020-10-02 ENCOUNTER — RESULT REVIEW (OUTPATIENT)
Age: 50
End: 2020-10-02

## 2020-10-02 ENCOUNTER — APPOINTMENT (OUTPATIENT)
Dept: ULTRASOUND IMAGING | Facility: IMAGING CENTER | Age: 50
End: 2020-10-02
Payer: MEDICARE

## 2020-10-02 DIAGNOSIS — N31.9 NEUROMUSCULAR DYSFUNCTION OF BLADDER, UNSPECIFIED: ICD-10-CM

## 2020-10-02 DIAGNOSIS — Z98.890 OTHER SPECIFIED POSTPROCEDURAL STATES: Chronic | ICD-10-CM

## 2020-10-02 PROCEDURE — 76770 US EXAM ABDO BACK WALL COMP: CPT

## 2020-10-02 PROCEDURE — 76770 US EXAM ABDO BACK WALL COMP: CPT | Mod: 26

## 2020-10-04 ENCOUNTER — FORM ENCOUNTER (OUTPATIENT)
Age: 50
End: 2020-10-04

## 2020-10-07 ENCOUNTER — APPOINTMENT (OUTPATIENT)
Dept: INFECTIOUS DISEASE | Facility: CLINIC | Age: 50
End: 2020-10-07
Payer: MEDICARE

## 2020-10-07 VITALS
HEART RATE: 95 BPM | BODY MASS INDEX: 23.18 KG/M2 | OXYGEN SATURATION: 97 % | DIASTOLIC BLOOD PRESSURE: 64 MMHG | HEIGHT: 59 IN | TEMPERATURE: 98.2 F | WEIGHT: 115 LBS | SYSTOLIC BLOOD PRESSURE: 113 MMHG

## 2020-10-07 DIAGNOSIS — Z87.09 PERSONAL HISTORY OF OTHER DISEASES OF THE RESPIRATORY SYSTEM: ICD-10-CM

## 2020-10-07 PROCEDURE — 99213 OFFICE O/P EST LOW 20 MIN: CPT

## 2020-10-07 NOTE — HISTORY OF PRESENT ILLNESS
[FreeTextEntry1] : 51 y/o female comes for visit. \par \par She is on chronic daily Keflex for recurrent UTIs. Her urine is at baseline and still self caths once daily with visiting RN.\par \par C/o cough, nasal congestion x 5 days. low grade fever of 100.3 at home.\par \par No sick contacts, travel.

## 2020-10-07 NOTE — ASSESSMENT
[FreeTextEntry1] : 50-year-old female comes for followup visit for UTI and chronic abx prophylaxis. Her  symptoms are stable overall. \par \par Continue Keflex for chronic prophylaxis. Pt seems to be overall improved since starting daily prophylaxis\par \par Will check urine cx.\par \par Also has UTI symptoms - want to get tested. WIll check COVID and RVP PCR tests. \par \par Supportive care and fluids. \par \par Hold off flu shot given active URI issues. \par \par F/u with medical team for medical issues.  [Treatment Education] : treatment education [Treatment Adherence] : treatment adherence [Rx Dose / Side Effects] : Rx dose/side effects

## 2020-10-07 NOTE — PHYSICAL EXAM
[General Appearance - Alert] : alert [General Appearance - In No Acute Distress] : in no acute distress [Sclera] : the sclera and conjunctiva were normal [PERRL With Normal Accommodation] : pupils were equal in size, round, reactive to light [Extraocular Movements] : extraocular movements were intact [Outer Ear] : the ears and nose were normal in appearance [Oropharynx] : the oropharynx was normal with no thrush [Neck Appearance] : the appearance of the neck was normal [Neck Cervical Mass (___cm)] : no neck mass was observed [Jugular Venous Distention Increased] : there was no jugular-venous distention [Thyroid Diffuse Enlargement] : the thyroid was not enlarged [Auscultation Breath Sounds / Voice Sounds] : lungs were clear to auscultation bilaterally [Heart Rate And Rhythm] : heart rate was normal and rhythm regular [Heart Sounds] : normal S1 and S2 [Heart Sounds Gallop] : no gallops [Murmurs] : no murmurs [Heart Sounds Pericardial Friction Rub] : no pericardial rub [Bowel Sounds] : normal bowel sounds [Abdomen Soft] : soft [Abdomen Tenderness] : non-tender [Abdomen Mass (___ Cm)] : no abdominal mass palpated [Costovertebral Angle Tenderness] : no CVA tenderness [] : no rash [FreeTextEntry1] : wheelchair bound [Oriented To Time, Place, And Person] : oriented to person, place, and time [Affect] : the affect was normal

## 2020-10-08 ENCOUNTER — APPOINTMENT (OUTPATIENT)
Dept: PHYSICAL MEDICINE AND REHAB | Facility: CLINIC | Age: 50
End: 2020-10-08

## 2020-10-08 LAB
RAPID RVP RESULT: DETECTED
RV+EV RNA SPEC QL NAA+PROBE: DETECTED
SARS-COV-2 N GENE NPH QL NAA+PROBE: NOT DETECTED

## 2020-10-09 ENCOUNTER — APPOINTMENT (OUTPATIENT)
Dept: INTERNAL MEDICINE | Facility: CLINIC | Age: 50
End: 2020-10-09

## 2020-10-09 LAB — BACTERIA UR CULT: NORMAL

## 2020-10-30 ENCOUNTER — APPOINTMENT (OUTPATIENT)
Dept: INFECTIOUS DISEASE | Facility: CLINIC | Age: 50
End: 2020-10-30
Payer: MEDICARE

## 2020-10-30 PROCEDURE — G0008: CPT

## 2020-10-30 PROCEDURE — 90686 IIV4 VACC NO PRSV 0.5 ML IM: CPT

## 2020-11-02 ENCOUNTER — MED ADMIN CHARGE (OUTPATIENT)
Age: 50
End: 2020-11-02

## 2020-11-13 ENCOUNTER — APPOINTMENT (OUTPATIENT)
Dept: ENDOCRINOLOGY | Facility: CLINIC | Age: 50
End: 2020-11-13
Payer: MEDICARE

## 2020-11-13 PROCEDURE — 99443: CPT | Mod: 95

## 2020-11-13 NOTE — ASSESSMENT
[FreeTextEntry1] : 50 y.o. female with h/o hyperprolactinemia, subclinical hypothyroidism, and vitamin D def.\par 1. Hyperprolactinemia- Suspect pituitary microadenoma. Since getting menses and no galactorrhea, have been monitoring given medication sensitivity. If prolactin level is stable, will continue to monitor. Will check serum prolactin. \par 2. Subclinical hypothyroidism- Will check TFTs and if stable, will continue to monitor.\par 3. Vitamin D def- Will check 25 vitamin D level and will continue supplement. Regarding bone health, DEXA scan shows normal BMD. Will monitor for now and repeat in several years.\par 4. Anemia- Will check CBC and iron studies.\par 5. Hyperlipidemia- Encouraged low fat diet. Will check lipids. \par \par If stable, follow up in 6 months\par Already received flu vaccine\par \par Time spent on telephone visit is 21 minutes.

## 2020-11-13 NOTE — REASON FOR VISIT
[Home] : at home, [unfilled] , at the time of the visit. [Medical Office: ([facility])___] : at the medical office located in  [Verbal consent obtained from patient] : the patient, [unfilled] [Follow - Up] : a follow-up visit [Hypothyroidism] : hypothyroidism [Other___] : [unfilled]

## 2020-11-13 NOTE — REVIEW OF SYSTEMS
[Fatigue] : fatigue [Recent Weight Gain (___ Lbs)] : recent weight gain: [unfilled] lbs [Constipation] : constipation [Diarrhea] : diarrhea [Irregular Menses] : irregular menses [Joint Pain] : joint pain [Dry Skin] : dry skin [Hair Loss] : hair loss [Headaches] : headaches [Polydipsia] : no polydipsia [Hot Flashes] : hot flashes [Negative] : Respiratory

## 2020-11-13 NOTE — HISTORY OF PRESENT ILLNESS
[FreeTextEntry1] : 50 y.o. female with h/o CP, hyperprolactinemia and subclinical hypothyroidism here for follow up visit. Reports irregular menses and follows with GYN. Reports heavy and long menstrual cycle. Does have fibroids. C/o dry skin. Had avulsion fracture in the past. Last brain MRI in June 2014 showed no obvious pituitary lesion but was noncontrast. Getting headaches but not severe. No galactorrhea. C/o hair loss is worse. Has been taking iron 325 mg daily. Dealing with chronic UTIs and BV infection. Follows with ID. Does have daily urine catherization. \par \par In regards to thyroid disease (subclinical hypothyroidism), did not tolerate treatment with T4 in the past. C/o fatigue. No neck complaints. \par \par Regarding bone health, wheel chair bound therefore unable to perform weight bearing activity. Had DEXA scan in November 2017 showing spine -0.9 (arthritic changes) , left femoral neck -0.5, total hip -0.6 and 1/3 radius 0.2. Takes vitamin D3 5,000 IU daily. Eats leafy greens and cheese. No falls.

## 2020-11-23 ENCOUNTER — APPOINTMENT (OUTPATIENT)
Dept: INTERNAL MEDICINE | Facility: CLINIC | Age: 50
End: 2020-11-23

## 2020-11-23 DIAGNOSIS — R65.10 SYSTEMIC INFLAMMATORY RESPONSE SYNDROME (SIRS) OF NON-INFECTIOUS ORIGIN W/OUT ACUTE ORGAN DYSFUNCTION: ICD-10-CM

## 2020-11-23 DIAGNOSIS — Z00.00 ENCOUNTER FOR GENERAL ADULT MEDICAL EXAMINATION W/OUT ABNORMAL FINDINGS: ICD-10-CM

## 2020-11-24 PROBLEM — Z00.00 HEALTHCARE MAINTENANCE: Status: ACTIVE | Noted: 2019-12-14

## 2020-11-24 PROBLEM — R65.10 SIRS (SYSTEMIC INFLAMMATORY RESPONSE SYNDROME): Status: RESOLVED | Noted: 2019-12-02 | Resolved: 2020-11-24

## 2020-11-25 ENCOUNTER — APPOINTMENT (OUTPATIENT)
Dept: INTERNAL MEDICINE | Facility: CLINIC | Age: 50
End: 2020-11-25

## 2020-11-26 NOTE — PHYSICAL EXAM
[No Acute Distress] : no acute distress [de-identified] : Unable to perform physical exam given nature of telephonic visit. Over the phone, pt was not in acute distress and able to speak full sentences without difficulty.

## 2020-11-26 NOTE — HISTORY OF PRESENT ILLNESS
[Home] : at home, [unfilled] , at the time of the visit. [Other Location: e.g. Home (Enter Location, City,State)___] : at [unfilled] [Verbal consent obtained from patient] : the patient, [unfilled] [FreeTextEntry1] : CPE [de-identified] : 51 y/o F with hx of cervical myelopathy/cerebral palsy (wheelchair bound), uterine fibroids with menorrhagia, anemia, ?IBS, chronic urinary incontinence (daily straight caths) with recurrent UTIs, vaginitis, and BV, hyperprolactinemia, and subclinical hypothyroidism presenting for annual CPE via telephonic visit. Pt rescheduled in-person visit to telephonic given concerns the COVID-19 pandemic and her immunocompromised state. Pt reports feeling well overall. Notes that she caught a cold in early October (+ for entero/rhinovirus), which resolved, and she subsequently got her flu vaccine at the end of October. Also noted that like last visit in June 2020, she still has heavy menses and loose bowel movements. Notes that her recurrent BV/vaginitis symptoms have been stable and she tries not to use medications for it now if she doesn't have to since it seems disturbing the balance causes more issues; states sitz bath with camomile have really helped. Denied f/c/n/v, CP, SOB, abdominal pain, dysuria, hematuria, hematochezia, constipation.

## 2020-11-26 NOTE — ASSESSMENT
[FreeTextEntry1] : 49 y/o F with hx of cervical myelopathy/cerebral palsy (wheelchair bound), uterine fibroids with menorrhagia, anemia, ?IBS, chronic urinary incontinence (daily straight caths) with recurrent UTIs, vaginitis, and BV, hyperprolactinemia, and subclinical hypothyroidism presenting for annual CPE.\par \par #Menorrhagia in setting of uterine fibroids\par -pt continues to report heavy menses, hx of anemia\par -Other than Hgb of 9.4 during hospitalization in 11/2019, pt has been consistently around baseline Hgb ~11-12\par -c/w iron supplement 65mcg PO daily\par -c/w Vit D3 125mcg PO daily; last Vit D 25-hydroxy level wnl in 11/2019\par -follows with Hematology and OBGYN\par -continue to monitor CBC given reports of heavy menses\par \par #Diarrhea\par -pt attributes to possibly IBS, however could not find supporting evidence of IBS in chart\par -appears to occur intermittently, continue to monitor for now\par \par #Cerebral palsy/cervical myelopathy c/b functional quadriplegia - stable\par -c/w wheelchair-use\par -has HHA 12 hrs/day for 7 days/week\par -follows with Neurology\par \par #Chronic urinary retention/incontinence\par -likely 2/2 neurogenic bladder\par -c/w daily straight caths (has RN who comes once a day to help with cath)\par \par #Recurrent UTIs - stable\par -c/w daily Keflex for ppx/chronic suppression\par -Last UCx in 10/2020 was negative for infection\par -follows with ID\par \par #Recurrent vaginitis/BV/Vaginal candidiasis\par -c/w Terconazole vaginal suppositories PRN\par -c/w metronidazole vaginal gel PRN\par \par #Hyperprolactinemia and subclinical hypothyroidism\par -Prolactin level 200 in 6/2020\par -TSH 4.67 and FT4 1.0 in 6/2020\par -as per pt, labs to be drawn by Endo on 11/25/20 \par -follows with Endocrinology\par \par #HCM\par -Immunizations: received flu vaccine on 11/2/20; due for Td/Tdap (last was May 2009) and zoster vaccine (age 50)\par -Colon cancer screening: pt would like FIT test, will  kit and script after her blood draw with Endo on 11/25/20\par -Depression screening: PHQ-2 score of 0 today\par -Cervical cancer screening: Pap smear in 9/2020 was negative\par -Breast cancer screening: BIRADS 2 on Mammogram and Breast US in 8/2020; mammogram/breast US to be repeated in 1 year\par -Labs ordered: pt would like to minimize exposure/blood draws, plans to have full set of labs drawn by Endocrine on 11/25/20\par \par RTC in 6 months for follow-up, or sooner PRN\par d/w Dr. Teran

## 2020-11-26 NOTE — REVIEW OF SYSTEMS
[Negative] : Neurological [Fever] : no fever [Chills] : no chills [Vision Problems] : no vision problems [Hearing Loss] : no hearing loss [Sore Throat] : no sore throat [Chest Pain] : no chest pain [Shortness Of Breath] : no shortness of breath [Wheezing] : no wheezing [Cough] : no cough [Abdominal Pain] : no abdominal pain [Nausea] : no nausea [Constipation] : no constipation [Vomiting] : no vomiting [Dysuria] : no dysuria [Hematuria] : no hematuria [Skin Rash] : no skin rash [Headache] : no headache [Dizziness] : no dizziness [FreeTextEntry7] : diarrhea/loose bowel movements [FreeTextEntry8] : chronic incontinence/retention [FreeTextEntry9] : wheelchair bound

## 2020-11-27 ENCOUNTER — APPOINTMENT (OUTPATIENT)
Dept: INTERNAL MEDICINE | Facility: CLINIC | Age: 50
End: 2020-11-27

## 2020-11-30 LAB
25(OH)D3 SERPL-MCNC: 48.9 NG/ML
ABO + RH PNL BLD: NORMAL
ALBUMIN SERPL ELPH-MCNC: 4.3 G/DL
ALP BLD-CCNC: 60 U/L
ALT SERPL-CCNC: 14 U/L
ANION GAP SERPL CALC-SCNC: 13 MMOL/L
AST SERPL-CCNC: 15 U/L
BASOPHILS # BLD AUTO: 0.04 K/UL
BASOPHILS NFR BLD AUTO: 0.7 %
BILIRUB SERPL-MCNC: 0.2 MG/DL
BUN SERPL-MCNC: 11 MG/DL
CALCIUM SERPL-MCNC: 9.4 MG/DL
CHLORIDE SERPL-SCNC: 102 MMOL/L
CHOLEST SERPL-MCNC: 227 MG/DL
CO2 SERPL-SCNC: 22 MMOL/L
CREAT SERPL-MCNC: 0.33 MG/DL
EOSINOPHIL # BLD AUTO: 0.1 K/UL
EOSINOPHIL NFR BLD AUTO: 1.8 %
ESTRADIOL SERPL-MCNC: 57 PG/ML
FERRITIN SERPL-MCNC: 23 NG/ML
FSH SERPL-MCNC: 4.1 IU/L
GLUCOSE SERPL-MCNC: 74 MG/DL
HCT VFR BLD CALC: 36.3 %
HDLC SERPL-MCNC: 87 MG/DL
HGB BLD-MCNC: 11.9 G/DL
IMM GRANULOCYTES NFR BLD AUTO: 0.2 %
IRON SATN MFR SERPL: 12 %
IRON SERPL-MCNC: 43 UG/DL
LDLC SERPL CALC-MCNC: 129 MG/DL
LH SERPL-ACNC: 1.4 IU/L
LYMPHOCYTES # BLD AUTO: 1.92 K/UL
LYMPHOCYTES NFR BLD AUTO: 35.2 %
MAN DIFF?: NORMAL
MCHC RBC-ENTMCNC: 29.6 PG
MCHC RBC-ENTMCNC: 32.8 GM/DL
MCV RBC AUTO: 90.3 FL
MONOCYTES # BLD AUTO: 0.43 K/UL
MONOCYTES NFR BLD AUTO: 7.9 %
NEUTROPHILS # BLD AUTO: 2.95 K/UL
NEUTROPHILS NFR BLD AUTO: 54.2 %
NONHDLC SERPL-MCNC: 141 MG/DL
PLATELET # BLD AUTO: 240 K/UL
POTASSIUM SERPL-SCNC: 4.5 MMOL/L
PROLACTIN SERPL-MCNC: 125 NG/ML
PROT SERPL-MCNC: 7.1 G/DL
RBC # BLD: 4.02 M/UL
RBC # FLD: 12.1 %
SODIUM SERPL-SCNC: 136 MMOL/L
T4 FREE SERPL-MCNC: 1 NG/DL
TIBC SERPL-MCNC: 369 UG/DL
TRIGL SERPL-MCNC: 57 MG/DL
TSH SERPL-ACNC: 4.34 UIU/ML
UIBC SERPL-MCNC: 326 UG/DL
VIT B12 SERPL-MCNC: >2000 PG/ML
WBC # FLD AUTO: 5.45 K/UL

## 2020-12-06 LAB
MONOMERIC PROLACTIN (ICMA)*: 97 NG/ML
PERCENT MACROPROLACTIN: 19 %
PROLACTIN, SERUM (ICMA)*: 120 NG/ML

## 2020-12-09 ENCOUNTER — NON-APPOINTMENT (OUTPATIENT)
Age: 50
End: 2020-12-09

## 2020-12-21 PROBLEM — Z87.09 HISTORY OF SORE THROAT: Status: RESOLVED | Noted: 2019-11-27 | Resolved: 2020-12-21

## 2020-12-23 PROBLEM — Z87.09 HISTORY OF UPPER RESPIRATORY INFECTION: Status: RESOLVED | Noted: 2020-10-07 | Resolved: 2020-12-23

## 2020-12-31 ENCOUNTER — APPOINTMENT (OUTPATIENT)
Dept: OPHTHALMOLOGY | Facility: CLINIC | Age: 50
End: 2020-12-31

## 2021-02-01 ENCOUNTER — APPOINTMENT (OUTPATIENT)
Dept: INTERNAL MEDICINE | Facility: CLINIC | Age: 51
End: 2021-02-01

## 2021-02-04 ENCOUNTER — APPOINTMENT (OUTPATIENT)
Dept: INFECTIOUS DISEASE | Facility: CLINIC | Age: 51
End: 2021-02-04

## 2021-02-08 NOTE — ASSESSMENT
[FreeTextEntry1] : 51 y/o F with hx of cervical myelopathy/cerebral palsy (wheelchair bound), uterine fibroids with menorrhagia, anemia, ?IBS, chronic urinary incontinence (daily straight caths) with recurrent UTIs, vaginitis, and BV, hyperprolactinemia, and subclinical hypothyroidism presenting via telephonic visit for pruritic skin rash, increased chronic joint pains, and questions about the COVID-19 vaccine.\par \par #Pruritic skin rash\par - likely 2/2 eczema/dermatitis given hx of conditions and reported improvement in areas with use of Eucerin emollient\par - advised to continue using Eucerin on not only hands but rest of body with similar rash as pt reports that is has worked for her; can also consider other OTC emollients/moisturizers such as Aquaphor or Aleve\par - if concerned about preservatives in products, advised pt to look for preservative-free versions e.g. Hypersensitive/Ultrasensitive line of Eucerin products\par - unable to visualize rash this visit as pt requested telephonic visit; if rash persistent or without improvement, advised to call clinic back and at that time will need either in-clinic visit, video, or photos to better assess rash\par - continue to monitor on conservative therapy for now\par \par #COVID-19 vaccine information\par - informed pt that the clinic is not offering the vaccine at this time\par - discussed her thoughts and worries about the vaccine\par - advised pt that despite her allergy hx, she has had a safe record with taking vaccines in the past; ultimately the benefits outweigh the risks of getting the vaccine\par \par #Cervical myelopathy/functional quadriplegia c/b increased chronic joint pains\par - pt reports that over the past year she feels her chronic joint pains have increased since she has not had physical therapy sessions for past year (does not wish to risk going outside for it due to the pandemic)\par - advised pt to try short interval exercises throughout the day at home when possible and as able to keep active\par - c/w wheelchair-use and has HHA 12 hrs/day x7 days/wk (can possibly help with simple exercises?) \par - Tylenol PRN for pain\par - pt to call clinic back if pain is uncontrolled or continues to worsen despite above interventions\par \par RTC in 2-3 months for follow-up, or sooner PRN\par d/w Dr. Chakraborty

## 2021-02-08 NOTE — PHYSICAL EXAM
[de-identified] : Unable to perform given nature of telephonic visit. Over the phone, pt was NAD and able to speak full sentences without difficulty.

## 2021-02-08 NOTE — REVIEW OF SYSTEMS
[Negative] : Genitourinary [Fever] : no fever [Chills] : no chills [Pain] : no pain [Vision Problems] : no vision problems [Hearing Loss] : no hearing loss [Sore Throat] : no sore throat [Chest Pain] : no chest pain [Shortness Of Breath] : no shortness of breath [Wheezing] : no wheezing [Abdominal Pain] : no abdominal pain [Nausea] : no nausea [Vomiting] : no vomiting [Dysuria] : no dysuria [Hematuria] : no hematuria [Headache] : no headache [Dizziness] : no dizziness [FreeTextEntry8] : chronic incontinence/retention [FreeTextEntry9] : +baseline joint pains but increased over past year; wheelchair bound [de-identified] : +itchy red patches on hand, R hip, L knee, and back of R knee

## 2021-02-08 NOTE — END OF VISIT
[Time Spent: ___ minutes] : I have spent [unfilled] minutes of time on the encounter. [FreeTextEntry3] : I personally discussed this patient with the Resident via audio technology at the time of the visit. I agree with the assessment and plan as written, unless noted below.\par

## 2021-02-08 NOTE — HISTORY OF PRESENT ILLNESS
[Home] : at home, [unfilled] , at the time of the visit. [Other Location: e.g. Home (Enter Location, City,State)___] : at [unfilled] [Verbal consent obtained from patient] : the patient, [unfilled] [FreeTextEntry8] : 51 y/o F with hx of cervical myelopathy/cerebral palsy (wheelchair bound), uterine fibroids with menorrhagia, anemia, ?IBS, chronic urinary incontinence (daily straight caths) with recurrent UTIs, vaginitis, and BV, hyperprolactinemia, and subclinical hypothyroidism presenting via telephonic visit for two main issues: (1) itchy skin rash for the past weeks-1 month, and (2) questions about the COVID-19 vaccine. She reports that overall she feels well other than some increased pain in her joints for the past year from not working with physical therapy in a long time due to the COVID-19 pandemic.\par \par #Skin rash\par - Pt reports having a red bumpy patches on her skin that is itchy on a few areas of her body including her R hip, L knee, and behind R knee for the past couple weeks to ~1 month. Also notes having similar rash on her hands that improves with Eucerin. Though she reports only being able to use it during the night because she washes her hands frequently during the day. Pt reports having a hx of eczema/dermatitis, but states she has not had an episode for years. Notes that she has tried bacitracin zinc and zinc oxide on the other areas of her body that have helped a little bit but the rash in some areas have persisted for a month. Additionally notes that she prefers more natural products given her many allergies and is wary of using certain products on areas of her body that she hasn't tried before.\par \par #COVID-19 vaccine\par - Pt had questions about the COVID-19 vaccine including whether the clinic will have the vaccine and if she should get the vaccine given her history of being allergic to many things. Reports anaphylactic reactions to latex and macrolide-class antibiotics, and possibly sulfonamides. Denies having any history of anaphylactic reactions to previous vaccines (received flu and Tdap vaccines recently).\par \par #Joint pains\par - Pt reports having some increased joint pain compared to baseline for the past year or so. Attributes it to not being able to work with physical therapy since last February as she does not want to go out in the COVID-19 pandemic. Reports staying inside her home for the most part during this pandemic. Does not take any medication for the pain.

## 2021-03-12 ENCOUNTER — NON-APPOINTMENT (OUTPATIENT)
Age: 51
End: 2021-03-12

## 2021-03-15 ENCOUNTER — OUTPATIENT (OUTPATIENT)
Dept: OUTPATIENT SERVICES | Facility: HOSPITAL | Age: 51
LOS: 1 days | Discharge: HOME | End: 2021-03-15

## 2021-03-15 DIAGNOSIS — Z98.890 OTHER SPECIFIED POSTPROCEDURAL STATES: Chronic | ICD-10-CM

## 2021-03-15 DIAGNOSIS — D64.9 ANEMIA, UNSPECIFIED: ICD-10-CM

## 2021-03-18 ENCOUNTER — APPOINTMENT (OUTPATIENT)
Dept: HEMATOLOGY ONCOLOGY | Facility: CLINIC | Age: 51
End: 2021-03-18
Payer: MEDICARE

## 2021-03-18 ENCOUNTER — RESULT REVIEW (OUTPATIENT)
Age: 51
End: 2021-03-18

## 2021-03-18 VITALS
HEIGHT: 59 IN | TEMPERATURE: 97.7 F | BODY MASS INDEX: 24.19 KG/M2 | OXYGEN SATURATION: 98 % | DIASTOLIC BLOOD PRESSURE: 76 MMHG | RESPIRATION RATE: 15 BRPM | SYSTOLIC BLOOD PRESSURE: 115 MMHG | HEART RATE: 89 BPM | WEIGHT: 120 LBS

## 2021-03-18 LAB
BASOPHILS # BLD AUTO: 0.06 K/UL — SIGNIFICANT CHANGE UP (ref 0–0.2)
BASOPHILS NFR BLD AUTO: 1.1 % — SIGNIFICANT CHANGE UP (ref 0–2)
EOSINOPHIL # BLD AUTO: 0.14 K/UL — SIGNIFICANT CHANGE UP (ref 0–0.5)
EOSINOPHIL NFR BLD AUTO: 2.6 % — SIGNIFICANT CHANGE UP (ref 0–6)
HCT VFR BLD CALC: 35.7 % — SIGNIFICANT CHANGE UP (ref 34.5–45)
HGB BLD-MCNC: 11.8 G/DL — SIGNIFICANT CHANGE UP (ref 11.5–15.5)
IMM GRANULOCYTES NFR BLD AUTO: 0.2 % — SIGNIFICANT CHANGE UP (ref 0–1.5)
LYMPHOCYTES # BLD AUTO: 1.95 K/UL — SIGNIFICANT CHANGE UP (ref 1–3.3)
LYMPHOCYTES # BLD AUTO: 36.9 % — SIGNIFICANT CHANGE UP (ref 13–44)
MCHC RBC-ENTMCNC: 29.5 PG — SIGNIFICANT CHANGE UP (ref 27–34)
MCHC RBC-ENTMCNC: 33.1 G/DL — SIGNIFICANT CHANGE UP (ref 32–36)
MCV RBC AUTO: 89.3 FL — SIGNIFICANT CHANGE UP (ref 80–100)
MONOCYTES # BLD AUTO: 0.48 K/UL — SIGNIFICANT CHANGE UP (ref 0–0.9)
MONOCYTES NFR BLD AUTO: 9.1 % — SIGNIFICANT CHANGE UP (ref 2–14)
NEUTROPHILS # BLD AUTO: 2.65 K/UL — SIGNIFICANT CHANGE UP (ref 1.8–7.4)
NEUTROPHILS NFR BLD AUTO: 50.1 % — SIGNIFICANT CHANGE UP (ref 43–77)
NRBC # BLD: 0 /100 WBCS — SIGNIFICANT CHANGE UP (ref 0–0)
PLATELET # BLD AUTO: 256 K/UL — SIGNIFICANT CHANGE UP (ref 150–400)
RBC # BLD: 4 M/UL — SIGNIFICANT CHANGE UP (ref 3.8–5.2)
RBC # FLD: 14.2 % — SIGNIFICANT CHANGE UP (ref 10.3–14.5)
WBC # BLD: 5.29 K/UL — SIGNIFICANT CHANGE UP (ref 3.8–10.5)
WBC # FLD AUTO: 5.29 K/UL — SIGNIFICANT CHANGE UP (ref 3.8–10.5)

## 2021-03-18 PROCEDURE — 99214 OFFICE O/P EST MOD 30 MIN: CPT

## 2021-03-18 NOTE — HISTORY OF PRESENT ILLNESS
[de-identified] : 47yo F w/ cerebral palsy, chronic urinary incontinence (daily straight cath) with recurrent UTIs, vaginitis, hyperprolactinemia, and subclinical hypothyroidism here for evaluation of anemia.\par Her Hgb has ranged from 10.8-11.2 over the last year. Her MCV is normal with normal WBC and plt count. Iron studies, B12, folate unremarkable in Sept. Retic count wnl. CMP showed a low Cr, but otherwise normal. \par Had recent UTI s/p course of Abx. She is on Keflex for PPx. No other medications. She is taking multiple vitamins including iron 325mg once daily (since July 2018), vit C 1000, B12, folic acid, D3 5000, and probiotic. \par \par She has very heavy menses for 2 days, gotten heavier since April. She has known fibroids. LMP 11/14/18, lasts 2 weeks.  [de-identified] : Pt was last seen here in 12/2018.\par Pt reports that since last visit, she continues to have heavy menses. She has known fibroids. \par Currently has her menses, today is day 16. States that she saw her OBGYN who recommended hysterectomy but pt is not interested at this time.  \par She is taking ferrous ascorbate EBMfer

## 2021-03-18 NOTE — ASSESSMENT
[FreeTextEntry1] : 49yo F w/ cerebral palsy, chronic urinary incontinence (daily straight cath) with recurrent UTIs, vaginitis, hyperprolactinemia, and subclinical hypothyroidism here for evaluation of anemia.\par Her Hgb today is wnl - 11.8. Will continue her current vitamins. \par f/u w/ PMD, gyn and endo as scheduled. \par Pt also had questions regarding the COVID-19 vaccine given her heavy menses; we reviewed that she does not have a bleeding disorder and that her heavy menstrual bleeding is from the fibroids. She is scheduled for it next week\par All questions answered\par RTC as needed

## 2021-03-18 NOTE — PHYSICAL EXAM
[Normal] : affect appropriate [de-identified] : in wheelchair [de-identified] : contracted hands b/l; decreased strength b/l LE

## 2021-03-25 ENCOUNTER — FORM ENCOUNTER (OUTPATIENT)
Age: 51
End: 2021-03-25

## 2021-03-28 ENCOUNTER — FORM ENCOUNTER (OUTPATIENT)
Age: 51
End: 2021-03-28

## 2021-04-23 ENCOUNTER — APPOINTMENT (OUTPATIENT)
Dept: ULTRASOUND IMAGING | Facility: IMAGING CENTER | Age: 51
End: 2021-04-23
Payer: MEDICARE

## 2021-04-23 ENCOUNTER — OUTPATIENT (OUTPATIENT)
Dept: OUTPATIENT SERVICES | Facility: HOSPITAL | Age: 51
LOS: 1 days | End: 2021-04-23
Payer: MEDICARE

## 2021-04-23 ENCOUNTER — RESULT REVIEW (OUTPATIENT)
Age: 51
End: 2021-04-23

## 2021-04-23 DIAGNOSIS — N93.9 ABNORMAL UTERINE AND VAGINAL BLEEDING, UNSPECIFIED: ICD-10-CM

## 2021-04-23 DIAGNOSIS — Z98.890 OTHER SPECIFIED POSTPROCEDURAL STATES: Chronic | ICD-10-CM

## 2021-04-23 PROCEDURE — 76856 US EXAM PELVIC COMPLETE: CPT | Mod: 26

## 2021-04-23 PROCEDURE — 76856 US EXAM PELVIC COMPLETE: CPT

## 2021-04-25 ENCOUNTER — FORM ENCOUNTER (OUTPATIENT)
Age: 51
End: 2021-04-25

## 2021-04-29 ENCOUNTER — NON-APPOINTMENT (OUTPATIENT)
Age: 51
End: 2021-04-29

## 2021-05-14 ENCOUNTER — APPOINTMENT (OUTPATIENT)
Dept: ENDOCRINOLOGY | Facility: CLINIC | Age: 51
End: 2021-05-14
Payer: MEDICARE

## 2021-05-14 VITALS
TEMPERATURE: 98.1 F | HEART RATE: 94 BPM | SYSTOLIC BLOOD PRESSURE: 120 MMHG | BODY MASS INDEX: 24.19 KG/M2 | WEIGHT: 120 LBS | HEIGHT: 59 IN | OXYGEN SATURATION: 98 % | DIASTOLIC BLOOD PRESSURE: 80 MMHG

## 2021-05-14 DIAGNOSIS — Z20.828 CONTACT WITH AND (SUSPECTED) EXPOSURE TO OTHER VIRAL COMMUNICABLE DISEASES: ICD-10-CM

## 2021-05-14 PROCEDURE — 99214 OFFICE O/P EST MOD 30 MIN: CPT

## 2021-05-14 NOTE — HISTORY OF PRESENT ILLNESS
[FreeTextEntry1] : 50 y.o. female with h/o CP, hyperprolactinemia and subclinical hypothyroidism here for follow up visit. She did get both doses of COVID-19 vaccine (Pfizer).  Reports irregular menses and follows with GYN. Reports heavy and long menstrual cycle. Does have fibroids. C/o dry skin. Had avulsion fracture in the past. Last brain MRI in June 2014 showed no obvious pituitary lesion but was noncontrast. Getting headaches but not severe. No galactorrhea. C/o hair loss which is worse. Has been taking iron 325 mg daily. Dealing with chronic UTIs and BV infection. Follows with ID. Does have daily urine catherization. \par \par In regards to thyroid disease (subclinical hypothyroidism), did not tolerate treatment with T4 in the past. C/o fatigue. No neck complaints. Reports weight gain. Eating bagels and too much cheese.  No PT now. \par \par Regarding bone health, wheel chair bound therefore unable to perform weight bearing activity. Had DEXA scan in November 2017 showing spine -0.9 (arthritic changes) , left femoral neck -0.5, total hip -0.6 and 1/3 radius 0.2. Takes vitamin D3 5,000 IU daily. Eats leafy greens and cheese. No falls.

## 2021-05-14 NOTE — ASSESSMENT
[FreeTextEntry1] : 50 y.o. female with h/o hyperprolactinemia, subclinical hypothyroidism, and vitamin D def.\par \par 1. Hyperprolactinemia- Suspect pituitary microadenoma. Since getting menses and no galactorrhea, have been monitoring given medication sensitivity. If prolactin level is stable, will continue to monitor. Will check serum prolactin. \par \par 2. Subclinical hypothyroidism- Will check TFTs and if stable, will continue to monitor.\par \par 3. Vitamin D def- Will check 25 vitamin D level and will continue supplement. Regarding bone health, DEXA scan shows normal BMD. Will monitor for now and repeat in several years.\par \par 4. Anemia- Will check CBC and iron studies.\par \par 5. Hyperlipidemia- Encouraged low fat diet. Will check lipids. \par \par 6. Joint pains- Will check STEF.\par \par If stable, follow up in 6 months

## 2021-05-14 NOTE — REVIEW OF SYSTEMS
[Fatigue] : fatigue [Recent Weight Gain (___ Lbs)] : recent weight gain: [unfilled] lbs [Constipation] : constipation [Diarrhea] : diarrhea [Irregular Menses] : irregular menses [Joint Pain] : joint pain [Dry Skin] : dry skin [Hair Loss] : hair loss [Headaches] : headaches [Hot Flashes] : hot flashes [Polydipsia] : no polydipsia [Swelling] : swelling [Negative] : Eyes

## 2021-05-14 NOTE — PHYSICAL EXAM
[Alert] : alert [No Acute Distress] : no acute distress [Normal Sclera/Conjunctiva] : normal sclera/conjunctiva [EOMI] : extra ocular movement intact [No LAD] : no lymphadenopathy [Thyroid Not Enlarged] : the thyroid was not enlarged [No Thyroid Nodules] : no palpable thyroid nodules [No Respiratory Distress] : no respiratory distress [Clear to Auscultation] : lungs were clear to auscultation bilaterally [Normal S1, S2] : normal S1 and S2 [Regular Rhythm] : with a regular rhythm [Normal Bowel Sounds] : normal bowel sounds [Not Tender] : non-tender [Not Distended] : not distended [Soft] : abdomen soft [Normal Anterior Cervical Nodes] : no anterior cervical lymphadenopathy [No Clubbing, Cyanosis] : no clubbing  or cyanosis of the fingernails [No Rash] : no rash [No Tremors] : no tremors [Normal Affect] : the affect was normal [Normal Mood] : the mood was normal [Acanthosis Nigricans] : no acanthosis nigricans [de-identified] : b/l edema [de-identified] : contracted UE B/L

## 2021-05-16 ENCOUNTER — LABORATORY RESULT (OUTPATIENT)
Age: 51
End: 2021-05-16

## 2021-05-17 LAB
25(OH)D3 SERPL-MCNC: 51.5 NG/ML
ALBUMIN SERPL ELPH-MCNC: 4.6 G/DL
ALP BLD-CCNC: 81 U/L
ALT SERPL-CCNC: 19 U/L
ANA SER IF-ACNC: NEGATIVE
ANION GAP SERPL CALC-SCNC: 11 MMOL/L
AST SERPL-CCNC: 18 U/L
BILIRUB SERPL-MCNC: 0.2 MG/DL
BUN SERPL-MCNC: 11 MG/DL
CALCIUM SERPL-MCNC: 9.6 MG/DL
CHLORIDE SERPL-SCNC: 101 MMOL/L
CHOLEST SERPL-MCNC: 230 MG/DL
CO2 SERPL-SCNC: 24 MMOL/L
COVID-19 SPIKE DOMAIN ANTIBODY INTERPRETATION: POSITIVE
CREAT SERPL-MCNC: 0.33 MG/DL
FERRITIN SERPL-MCNC: 29 NG/ML
FOLATE SERPL-MCNC: 12.5 NG/ML
GLUCOSE SERPL-MCNC: 93 MG/DL
HDLC SERPL-MCNC: 88 MG/DL
IRON SATN MFR SERPL: 18 %
IRON SERPL-MCNC: 66 UG/DL
LDLC SERPL CALC-MCNC: 130 MG/DL
NONHDLC SERPL-MCNC: 142 MG/DL
POTASSIUM SERPL-SCNC: 4.2 MMOL/L
PROLACTIN SERPL-MCNC: 171 NG/ML
PROT SERPL-MCNC: 7.2 G/DL
SARS-COV-2 AB SERPL IA-ACNC: >250 U/ML
SODIUM SERPL-SCNC: 136 MMOL/L
T4 FREE SERPL-MCNC: 1 NG/DL
TIBC SERPL-MCNC: 370 UG/DL
TRIGL SERPL-MCNC: 58 MG/DL
TSH SERPL-ACNC: 4.06 UIU/ML
UIBC SERPL-MCNC: 304 UG/DL
VIT B12 SERPL-MCNC: >2000 PG/ML

## 2021-05-20 ENCOUNTER — NON-APPOINTMENT (OUTPATIENT)
Age: 51
End: 2021-05-20

## 2021-05-21 ENCOUNTER — APPOINTMENT (OUTPATIENT)
Dept: OTOLARYNGOLOGY | Facility: CLINIC | Age: 51
End: 2021-05-21
Payer: MEDICARE

## 2021-05-21 ENCOUNTER — APPOINTMENT (OUTPATIENT)
Dept: INTERNAL MEDICINE | Facility: CLINIC | Age: 51
End: 2021-05-21
Payer: MEDICARE

## 2021-05-21 ENCOUNTER — OUTPATIENT (OUTPATIENT)
Dept: OUTPATIENT SERVICES | Facility: HOSPITAL | Age: 51
LOS: 1 days | End: 2021-05-21
Payer: MEDICARE

## 2021-05-21 VITALS
WEIGHT: 120 LBS | SYSTOLIC BLOOD PRESSURE: 106 MMHG | HEART RATE: 85 BPM | DIASTOLIC BLOOD PRESSURE: 68 MMHG | TEMPERATURE: 97.6 F | HEIGHT: 59 IN | BODY MASS INDEX: 24.19 KG/M2

## 2021-05-21 VITALS — DIASTOLIC BLOOD PRESSURE: 70 MMHG | SYSTOLIC BLOOD PRESSURE: 110 MMHG | OXYGEN SATURATION: 97 % | HEART RATE: 75 BPM

## 2021-05-21 DIAGNOSIS — R21 RASH AND OTHER NONSPECIFIC SKIN ERUPTION: ICD-10-CM

## 2021-05-21 DIAGNOSIS — R53.2 FUNCTIONAL QUADRIPLEGIA: ICD-10-CM

## 2021-05-21 DIAGNOSIS — N92.6 IRREGULAR MENSTRUATION, UNSPECIFIED: ICD-10-CM

## 2021-05-21 DIAGNOSIS — G80.9 CEREBRAL PALSY, UNSPECIFIED: ICD-10-CM

## 2021-05-21 DIAGNOSIS — I10 ESSENTIAL (PRIMARY) HYPERTENSION: ICD-10-CM

## 2021-05-21 DIAGNOSIS — G95.9 DISEASE OF SPINAL CORD, UNSPECIFIED: ICD-10-CM

## 2021-05-21 DIAGNOSIS — Z98.890 OTHER SPECIFIED POSTPROCEDURAL STATES: Chronic | ICD-10-CM

## 2021-05-21 DIAGNOSIS — E22.1 HYPERPROLACTINEMIA: ICD-10-CM

## 2021-05-21 PROCEDURE — G0463: CPT

## 2021-05-21 PROCEDURE — 99212 OFFICE O/P EST SF 10 MIN: CPT | Mod: 25

## 2021-05-21 PROCEDURE — 69210 REMOVE IMPACTED EAR WAX UNI: CPT

## 2021-05-21 PROCEDURE — 99213 OFFICE O/P EST LOW 20 MIN: CPT | Mod: GE

## 2021-05-21 NOTE — PHYSICAL EXAM
[Nasal Endoscopy Performed] : nasal endoscopy was performed, see procedure section for findings [] : septum deviated bilaterally [Midline] : trachea located in midline position [Normal] : no rashes [de-identified] : bilateral cerumen [de-identified] : congested

## 2021-05-21 NOTE — ASSESSMENT
[FreeTextEntry1] : Patient here for routine ear cleaning, otherwise doing well \par \par Wax:\par -Cerumen is removed from the right and left  ear canal with a curette and suction.\par -Rx:Debrox be placed in both ears on a routine basis to keep them free of wax.\par -Routine debridement suggested to keep the ears free of wax.\par \par f/u 6 months or prn

## 2021-05-21 NOTE — HISTORY OF PRESENT ILLNESS
[No] : patient does not have a  history of radiation therapy [Otalgia] : otalgia [Nasal Congestion] : nasal congestion [None] : No risk factors have been identified. [de-identified] : 49 y/o female with hx of severe cerumen impaction presents for routine cleaning. Otherwise feeling well. \par neurologic disorder requiring wheelchair confinement\par no throat complaints\par no modifying factors\par  [Anxiety] : no anxiety [Dizziness] : no dizziness [Headache] : no headache [Hearing Loss] : no hearing loss [Recurrent Otitis Media] : no recurrent otitis media [Otitis Media with Effusion] : no otitis media with effusion [Presbycusis] : no presbycusis [Congenital Ear Malformation] : no congenital ear malformation [Meniere Disease] : no Meniere disease [Otosclerosis] : no otosclerosis [Perilymphatic Fistula] : no perilymphatic fistula [Hypertension] : no hypertension [Loud Noise Exposure] : no history of loud noise exposure [Smoking] : no smoking [Early Onset Hearing Loss] : no early onset hearing loss [Stroke] : no stroke [Facial Pain] : no facial pain [Facial Pressure] : no facial pressure [Diplopia] : no diplopia [Ear Fullness] : no ear fullness [Allergic Rhinitis] : no allergic rhinitis [Maxillary Tooth Infection] : no maxillary tooth infection [Septal Deviation] : no septal deviation [Environmental Allergies] : no environmental allergies [Seasonal Allergies] : no seasonal allergies [Environmental Allergens] : no environmental allergens [Adenoidectomy] : no adenoidectomy [Nasal FB Removal] : no nasal foreign body removal [Allergies] : no allergies [Asthma] : no asthma [Neck Mass] : no neck mass [Neck Pain] : no neck pain [Chills] : no chills [Cold Intolerance] : no cold intolerance [Cough] : no cough [Fatigue] : no fatigue [Heat Intolerance] : no heat intolerance [Hyperthyroidism] : no hyperthyroidism [Sialadenitis] : no sialadenitis [Hodgkin Disease] : no hodgkin disease [Non-Hodgkin Lymphoma] : no non-hodgkin lymphoma [Tobacco Use] : no tobacco use [Alcohol Use] : no alcohol use [Graves Disease] : no graves disease [Thyroid Cancer] : no thyroid cancer

## 2021-05-24 LAB
BASOPHILS # BLD AUTO: 0.05 K/UL
BASOPHILS NFR BLD AUTO: 0.8 %
EOSINOPHIL # BLD AUTO: 0.17 K/UL
EOSINOPHIL NFR BLD AUTO: 2.9 %
ESTIMATED AVERAGE GLUCOSE: 103 MG/DL
ESTRADIOL SERPL-MCNC: 248 PG/ML
FSH SERPL-MCNC: 5.3 IU/L
HBA1C MFR BLD HPLC: 5.2 %
HCT VFR BLD CALC: 38.9 %
HGB BLD-MCNC: 12.6 G/DL
IMM GRANULOCYTES NFR BLD AUTO: 0.2 %
LH SERPL-ACNC: 12.1 IU/L
LYMPHOCYTES # BLD AUTO: 1.96 K/UL
LYMPHOCYTES NFR BLD AUTO: 33.2 %
MAN DIFF?: NORMAL
MCHC RBC-ENTMCNC: 29.5 PG
MCHC RBC-ENTMCNC: 32.4 GM/DL
MCV RBC AUTO: 91.1 FL
MONOCYTES # BLD AUTO: 0.57 K/UL
MONOCYTES NFR BLD AUTO: 9.6 %
NEUTROPHILS # BLD AUTO: 3.15 K/UL
NEUTROPHILS NFR BLD AUTO: 53.3 %
PLATELET # BLD AUTO: 234 K/UL
RBC # BLD: 4.27 M/UL
RBC # FLD: 12.8 %
WBC # FLD AUTO: 5.91 K/UL

## 2021-05-24 NOTE — HISTORY OF PRESENT ILLNESS
[FreeTextEntry1] : follow-up [de-identified] : 49 y/o F w/ hx of cervical myelopathy/cerebral palsy (wheelchair bound), uterine fibroids with menorrhagia, anemia, ?IBS, chronic urinary incontinence (daily straight caths) with recurrent UTIs, vaginitis, and BV, hyperprolactinemia, and subclinical hypothyroidism presenting for follow-up. Overall, pt feels well and largely at baseline, but has a few questions/concerns:\par \par #COVID-19 vaccine\par - Reports completing her 2-dose Pfizer COVID-19 vaccine (second dose in 4/16/21) and now with +antibodies on 5/14/21 labs. Reassured pt that she is able to do outdoor activities given her newly vaccinated status, though ideally with other vaccinated individuals and continuing to take the proper safety precautions such as wearing a mask and hand hygiene. Informed pt that it is unclear at this time whether there will be need for a booster vaccine in the future.\par \par #Irregular menstrual periods w/ hx of uterine fibroids and menorrhagia\par - Pt reports last normal menses in March, typically lasts ~15 days or so. Since then her periods have been varying from heavy to spotting to light to spotting, and now since early this month has stopped. Of note, pt has a hx of hyperprolactinemia with recent prolactin level on 5/14/21 relatively stable compared to prior.\par \par #Skin rash\par - Pt reports persistence of itchy/slightly red bumpy skin rash that she has had for the past couple months, mostly behind her R knee/lower thigh and also on her forehead. She endorses that both have improved with use of Eucerin but notes still present. Pt requested evaluation by Dermatology and for routine skin cancer screening for several moles on her body.\par \par #Chronic urinary incontinence\par - Pt reports that in the past few days she has been having slightly more discomfort with her urinary issues/straight caths, but notes that this happens from time to time. Endorses that she is still taking her prophylactic abx (Keflex) for hx of recurrent UTIs. Denies f/c/n/v, abdominal pain.\par

## 2021-05-24 NOTE — PHYSICAL EXAM
[Normal Sclera/Conjunctiva] : normal sclera/conjunctiva [Normal] : normal rate, regular rhythm, normal S1 and S2 and no murmur heard [Pedal Pulses Present] : the pedal pulses are present [No Edema] : there was no peripheral edema [No Extremity Clubbing/Cyanosis] : no extremity clubbing/cyanosis [Soft] : abdomen soft [Non Tender] : non-tender [Non-distended] : non-distended [No Masses] : no abdominal mass palpated [Normal Bowel Sounds] : normal bowel sounds [No Spinal Tenderness] : no spinal tenderness [No CVA Tenderness] : no CVA  tenderness [No Joint Swelling] : no joint swelling [Normal Affect] : the affect was normal [Normal Insight/Judgement] : insight and judgment were intact [de-identified] : mildly erythematous papular rash under R thigh and mild non-erythematous papular rash on forehead (improved per pt) [de-identified] : baseline b/l UE and LE weakness; wheelchair bound

## 2021-05-24 NOTE — REVIEW OF SYSTEMS
[Fever] : no fever [Chills] : no chills [Fatigue] : no fatigue [Hot Flashes] : no hot flashes [Pain] : no pain [Vision Problems] : no vision problems [Hearing Loss] : no hearing loss [Sore Throat] : no sore throat [Chest Pain] : no chest pain [Palpitations] : no palpitations [Shortness Of Breath] : no shortness of breath [Wheezing] : no wheezing [Cough] : no cough [Abdominal Pain] : no abdominal pain [Nausea] : no nausea [Constipation] : no constipation [Diarrhea] : diarrhea [Vomiting] : no vomiting [Heartburn] : no heartburn [Melena] : no melena [Hematuria] : no hematuria [Joint Swelling] : no joint swelling [Headache] : no headache [Dizziness] : no dizziness [Fainting] : no fainting [FreeTextEntry5] : chronic non-pitting LE edema [FreeTextEntry8] : chronic urinary incontinence/retention, slightly more discomfort with straight caths recently; irregular menstrual periods [FreeTextEntry9] : chronic pains in legs/back; wheelchair bound [de-identified] : itchy red bumpy rash under upper R thigh/behind R knee and on face (improving per pt)

## 2021-05-24 NOTE — ASSESSMENT
[FreeTextEntry1] : 51 y/o F w/ hx of cervical myelopathy/cerebral palsy (wheelchair bound), uterine fibroids with menorrhagia, anemia, ?IBS, chronic urinary incontinence (daily straight caths) with recurrent UTIs, vaginitis, and BV, hyperprolactinemia, and subclinical hypothyroidism presenting for follow-up. \par \par #Irregular menstrual periods w/ hx of uterine fibroids and menorrhagia\par - currently without menses\par - possibly 2/2 ismael-menopause vs less likely hyperprolactinemia (relatively stable levels)\par - educated on potential ismael-menopause symptoms (e.g. hot flashes, worsening bladder/vaginal issues, etc.)\par \par #Pruritic skin rash\par - suspect likely 2/2 eczema/dermatitis, possibly in setting of dry skin / heat / irritation \par - pt reports some improvement with Eucerin but remains persistent\par - Dermatology referral given for evaluation of skin rash and routine skin cancer screening as requested by pt\par \par #Chronic urinary incontinence/retention c/b recurrent UTIs\par - 2/2 neurogenic bladder\par - pt reports slight worsening of discomfort with her urinary issues/straight caths\par - c/w straight caths daily\par - c/w prophylactic Keflex daily given hx of recurrent UTIs\par - follows with Urology and ID\par \par #Hyperprolactinemia\par - Prolactin levels (5/14/21) elevated to 171 (but relatively stable compared to prior levels)\par - follows with Endocrinology; per Endo note, suspect pituitary microadenoma\par \par #Cervical myelopathy / cerebral palsy / functional quadriplegia \par - c/w wheelchair-use and has HHA 12 hrs/day x7 days/wk\par - c/w Tylenol PRN, massage therapy, and heat packs for chronic joint pains\par - resume physical therapy and home exercises as able\par \par #HCM\par - Depression screening: PHQ-2 score of 0 in Nov 2020\par - Immunizations: completed 2-dose series of Pfizer COVID-19 vaccine (received 2nd dose on 4/16/21); last flu and Tdap vaccine in Nov 2020\par - Colon cancer screening: FIT test (5/16/21) negative\par - Cervical cancer screening: last pap smear on 9/30/20 was negative\par - Breast cancer screening: last mammogram + US breast on 8/14/20 showed BIRADS 2; to be repeated in 1 yr per report\par - Labs: recent labs ordered by Endocrinology reviewed, largely unremarkable/stable\par \par \par RTC in 6 months for f/u on symptoms\par d/w Dr. Marcial\par

## 2021-06-03 ENCOUNTER — APPOINTMENT (OUTPATIENT)
Dept: INTERNAL MEDICINE | Facility: CLINIC | Age: 51
End: 2021-06-03

## 2021-06-03 ENCOUNTER — OUTPATIENT (OUTPATIENT)
Dept: OUTPATIENT SERVICES | Facility: HOSPITAL | Age: 51
LOS: 1 days | End: 2021-06-03
Payer: MEDICARE

## 2021-06-03 DIAGNOSIS — I10 ESSENTIAL (PRIMARY) HYPERTENSION: ICD-10-CM

## 2021-06-03 DIAGNOSIS — J18.9 PNEUMONIA, UNSPECIFIED ORGANISM: ICD-10-CM

## 2021-06-03 DIAGNOSIS — Z98.890 OTHER SPECIFIED POSTPROCEDURAL STATES: Chronic | ICD-10-CM

## 2021-06-03 PROCEDURE — G0463: CPT

## 2021-06-03 NOTE — END OF VISIT
[] : Resident [FreeTextEntry3] : Spoke with patient and myself as well, patient endorsing history as documented above in the HPI.  T-max of almost 103F, cough productive of a clear sputum, no shortness of breath but feeling chest congestion.  No fever today and no Tylenol for the past 48 hours.  Perhaps patient is improving but she is unsure.  She is wheelchair-bound and we discussed potentially getting a chest x-ray and coming in for a physical exam to see what is going on but given the mobility concerns/accessibility concerns it might not be a bad idea for patient to visit the ER if she is really not feeling well as x-ray can be performed and antibiotics can be given in the same area without patient having to travel to multiple places.  Additionally concerned about tachycardia and O2 saturation of 95% at home but since she is not in any respiratory distress right now and sort of feeling better she is going to wait till tomorrow morning.  Encourage patient to please call back to clinic at any time should she feel any different and want to talk to a provider.  If any severe or concerning change in clinical status will go to ER right away [Time Spent: ___ minutes] : I have spent [unfilled] minutes of time on the encounter.

## 2021-06-03 NOTE — PLAN
[FreeTextEntry1] : #VIral URI/PNA\par - instructed patient to go to ED given she currently has symptoms concerning for possible PNA vs. viral URI; patient is poorly mobile so it is difficult for her to receive outpatient workup (IE CXR)\par - patient with multiple abx allergies and denies trial of abx outpatient\par - patient agreeable to coordinate transport to ED 6/4 via EMS\par - patient agrees to use nasal saline to improve respiratory status\par \par D/w attending Dr. Gomez

## 2021-06-03 NOTE — ASSESSMENT
[FreeTextEntry1] : 51F PMH cerebral palsy/cervical myelopathy, wheelchair bound, uterine fibroids, anemia, IBS, urinary incontinence (straight catheterizes), recurrent UTIs on keflex, subclinical hypothyroidism, presents for fever, cold, cough, congestion x 5 days c/f URI which may have progressed to PNA.

## 2021-06-03 NOTE — PHYSICAL EXAM
[Normal Voice/Communication] : normal voice/communication [No Respiratory Distress] : no respiratory distress  [Normal] : affect was normal and insight and judgment were intact [de-identified] : unabe to perform given telephonic visit [de-identified] : Patient speaking in full sentences not in any apparent respiratory distress

## 2021-06-03 NOTE — HISTORY OF PRESENT ILLNESS
[Home] : at home, [unfilled] , at the time of the visit. [Other Location: e.g. Home (Enter Location, City,State)___] : at [unfilled] [FreeTextEntry8] : 51F PMH cerebral palsy/cervical myelopathy, wheelchair bound, uterine fibroids, anemia, IBS, urinary incontinence (straight catheterizes), recurrent UTIs on keflex, subclinical hypothyroidism, presents for fever, cold, cough, congestion.\par \par URI/PNA\par - patient reports since SUnday 6/30 she developed a viral syndrome (nasal congestion) a/w fever to 102.8\par - reports she received full Pfizer vaccination finished 4/16/21\par - reports head cold developed into a chest cold with cough minimally productive, occasionally clear sputum\par - has had human metapneumovirus in the past treated supportively\par - currently reports VS: T 99.9, , O2 94. Reports occasional palpitations when her heart races\par - protects herself well against COVID; double masks, avoids crowds, only spends time with vaccinated people\par - no N/V, abdominal pain; endorses moderate bleeding as she is currently menstruating

## 2021-06-03 NOTE — REVIEW OF SYSTEMS
[Fever] : fever [Fatigue] : fatigue [Postnasal Drip] : postnasal drip [Palpitations] : palpitations [Cough] : cough [Discharge] : no discharge [Earache] : no earache [Shortness Of Breath] : no shortness of breath [Abdominal Pain] : no abdominal pain [Vomiting] : no vomiting [Dysuria] : no dysuria [Joint Pain] : no joint pain [Itching] : no itching [Headache] : no headache [Suicidal] : not suicidal [Easy Bleeding] : no easy bleeding

## 2021-06-04 ENCOUNTER — EMERGENCY (EMERGENCY)
Facility: HOSPITAL | Age: 51
LOS: 1 days | Discharge: HOME | End: 2021-06-04
Attending: STUDENT IN AN ORGANIZED HEALTH CARE EDUCATION/TRAINING PROGRAM
Payer: MEDICARE

## 2021-06-04 VITALS
DIASTOLIC BLOOD PRESSURE: 73 MMHG | SYSTOLIC BLOOD PRESSURE: 106 MMHG | RESPIRATION RATE: 18 BRPM | WEIGHT: 119.93 LBS | HEART RATE: 100 BPM | OXYGEN SATURATION: 97 % | TEMPERATURE: 99 F | HEIGHT: 59 IN

## 2021-06-04 DIAGNOSIS — Z98.890 OTHER SPECIFIED POSTPROCEDURAL STATES: Chronic | ICD-10-CM

## 2021-06-04 LAB
ALBUMIN SERPL ELPH-MCNC: 4.2 G/DL — SIGNIFICANT CHANGE UP (ref 3.3–5)
ALP SERPL-CCNC: 83 U/L — SIGNIFICANT CHANGE UP (ref 40–120)
ALT FLD-CCNC: 16 U/L — SIGNIFICANT CHANGE UP (ref 10–45)
ANION GAP SERPL CALC-SCNC: 15 MMOL/L — SIGNIFICANT CHANGE UP (ref 5–17)
APPEARANCE UR: CLEAR — SIGNIFICANT CHANGE UP
APTT BLD: 29.6 SEC — SIGNIFICANT CHANGE UP (ref 27.5–35.5)
AST SERPL-CCNC: 17 U/L — SIGNIFICANT CHANGE UP (ref 10–40)
BASOPHILS # BLD AUTO: 0.05 K/UL — SIGNIFICANT CHANGE UP (ref 0–0.2)
BASOPHILS NFR BLD AUTO: 0.4 % — SIGNIFICANT CHANGE UP (ref 0–2)
BILIRUB SERPL-MCNC: 0.4 MG/DL — SIGNIFICANT CHANGE UP (ref 0.2–1.2)
BILIRUB UR-MCNC: NEGATIVE — SIGNIFICANT CHANGE UP
BUN SERPL-MCNC: 12 MG/DL — SIGNIFICANT CHANGE UP (ref 7–23)
CALCIUM SERPL-MCNC: 9.4 MG/DL — SIGNIFICANT CHANGE UP (ref 8.4–10.5)
CHLORIDE SERPL-SCNC: 101 MMOL/L — SIGNIFICANT CHANGE UP (ref 96–108)
CO2 SERPL-SCNC: 22 MMOL/L — SIGNIFICANT CHANGE UP (ref 22–31)
COLOR SPEC: COLORLESS — SIGNIFICANT CHANGE UP
CREAT SERPL-MCNC: 0.33 MG/DL — LOW (ref 0.5–1.3)
DIFF PNL FLD: NEGATIVE — SIGNIFICANT CHANGE UP
EOSINOPHIL # BLD AUTO: 0.11 K/UL — SIGNIFICANT CHANGE UP (ref 0–0.5)
EOSINOPHIL NFR BLD AUTO: 0.9 % — SIGNIFICANT CHANGE UP (ref 0–6)
GLUCOSE SERPL-MCNC: 93 MG/DL — SIGNIFICANT CHANGE UP (ref 70–99)
GLUCOSE UR QL: NEGATIVE — SIGNIFICANT CHANGE UP
HCG SERPL-ACNC: <2 MIU/ML — SIGNIFICANT CHANGE UP
HCT VFR BLD CALC: 38.8 % — SIGNIFICANT CHANGE UP (ref 34.5–45)
HGB BLD-MCNC: 13 G/DL — SIGNIFICANT CHANGE UP (ref 11.5–15.5)
IMM GRANULOCYTES NFR BLD AUTO: 0.5 % — SIGNIFICANT CHANGE UP (ref 0–1.5)
INR BLD: 1 RATIO — SIGNIFICANT CHANGE UP (ref 0.88–1.16)
KETONES UR-MCNC: NEGATIVE — SIGNIFICANT CHANGE UP
LACTATE BLDV-MCNC: 1.4 MMOL/L — SIGNIFICANT CHANGE UP (ref 0.7–2)
LEUKOCYTE ESTERASE UR-ACNC: NEGATIVE — SIGNIFICANT CHANGE UP
LYMPHOCYTES # BLD AUTO: 18.1 % — SIGNIFICANT CHANGE UP (ref 13–44)
LYMPHOCYTES # BLD AUTO: 2.1 K/UL — SIGNIFICANT CHANGE UP (ref 1–3.3)
MCHC RBC-ENTMCNC: 29.9 PG — SIGNIFICANT CHANGE UP (ref 27–34)
MCHC RBC-ENTMCNC: 33.5 GM/DL — SIGNIFICANT CHANGE UP (ref 32–36)
MCV RBC AUTO: 89.2 FL — SIGNIFICANT CHANGE UP (ref 80–100)
MONOCYTES # BLD AUTO: 1.19 K/UL — HIGH (ref 0–0.9)
MONOCYTES NFR BLD AUTO: 10.2 % — SIGNIFICANT CHANGE UP (ref 2–14)
NEUTROPHILS # BLD AUTO: 8.11 K/UL — HIGH (ref 1.8–7.4)
NEUTROPHILS NFR BLD AUTO: 69.9 % — SIGNIFICANT CHANGE UP (ref 43–77)
NITRITE UR-MCNC: NEGATIVE — SIGNIFICANT CHANGE UP
NRBC # BLD: 0 /100 WBCS — SIGNIFICANT CHANGE UP (ref 0–0)
PH UR: 6 — SIGNIFICANT CHANGE UP (ref 5–8)
PLATELET # BLD AUTO: 272 K/UL — SIGNIFICANT CHANGE UP (ref 150–400)
POTASSIUM SERPL-MCNC: 4.5 MMOL/L — SIGNIFICANT CHANGE UP (ref 3.5–5.3)
POTASSIUM SERPL-SCNC: 4.5 MMOL/L — SIGNIFICANT CHANGE UP (ref 3.5–5.3)
PROT SERPL-MCNC: 8.1 G/DL — SIGNIFICANT CHANGE UP (ref 6–8.3)
PROT UR-MCNC: NEGATIVE — SIGNIFICANT CHANGE UP
PROTHROM AB SERPL-ACNC: 12 SEC — SIGNIFICANT CHANGE UP (ref 10.6–13.6)
RAPID RVP RESULT: SIGNIFICANT CHANGE UP
RBC # BLD: 4.35 M/UL — SIGNIFICANT CHANGE UP (ref 3.8–5.2)
RBC # FLD: 12.5 % — SIGNIFICANT CHANGE UP (ref 10.3–14.5)
SARS-COV-2 RNA SPEC QL NAA+PROBE: SIGNIFICANT CHANGE UP
SODIUM SERPL-SCNC: 138 MMOL/L — SIGNIFICANT CHANGE UP (ref 135–145)
SP GR SPEC: 1.01 — SIGNIFICANT CHANGE UP (ref 1.01–1.02)
UROBILINOGEN FLD QL: NEGATIVE — SIGNIFICANT CHANGE UP
WBC # BLD: 11.62 K/UL — HIGH (ref 3.8–10.5)
WBC # FLD AUTO: 11.62 K/UL — HIGH (ref 3.8–10.5)

## 2021-06-04 PROCEDURE — 85025 COMPLETE CBC W/AUTO DIFF WBC: CPT

## 2021-06-04 PROCEDURE — 85610 PROTHROMBIN TIME: CPT

## 2021-06-04 PROCEDURE — 80053 COMPREHEN METABOLIC PANEL: CPT

## 2021-06-04 PROCEDURE — 93005 ELECTROCARDIOGRAM TRACING: CPT

## 2021-06-04 PROCEDURE — 0225U NFCT DS DNA&RNA 21 SARSCOV2: CPT

## 2021-06-04 PROCEDURE — 83605 ASSAY OF LACTIC ACID: CPT

## 2021-06-04 PROCEDURE — 99285 EMERGENCY DEPT VISIT HI MDM: CPT | Mod: CS,GC

## 2021-06-04 PROCEDURE — 99284 EMERGENCY DEPT VISIT MOD MDM: CPT | Mod: 25

## 2021-06-04 PROCEDURE — 71045 X-RAY EXAM CHEST 1 VIEW: CPT

## 2021-06-04 PROCEDURE — 71045 X-RAY EXAM CHEST 1 VIEW: CPT | Mod: 26

## 2021-06-04 PROCEDURE — 87040 BLOOD CULTURE FOR BACTERIA: CPT

## 2021-06-04 PROCEDURE — 84702 CHORIONIC GONADOTROPIN TEST: CPT

## 2021-06-04 PROCEDURE — 96361 HYDRATE IV INFUSION ADD-ON: CPT

## 2021-06-04 PROCEDURE — 96360 HYDRATION IV INFUSION INIT: CPT

## 2021-06-04 PROCEDURE — 93010 ELECTROCARDIOGRAM REPORT: CPT | Mod: GC

## 2021-06-04 PROCEDURE — 85379 FIBRIN DEGRADATION QUANT: CPT

## 2021-06-04 PROCEDURE — 85730 THROMBOPLASTIN TIME PARTIAL: CPT

## 2021-06-04 PROCEDURE — 87086 URINE CULTURE/COLONY COUNT: CPT

## 2021-06-04 PROCEDURE — 81003 URINALYSIS AUTO W/O SCOPE: CPT

## 2021-06-04 RX ORDER — VALACYCLOVIR 500 MG/1
4 TABLET, FILM COATED ORAL
Qty: 4 | Refills: 0
Start: 2021-06-04 | End: 2021-06-04

## 2021-06-04 RX ORDER — VALACYCLOVIR 500 MG/1
2000 TABLET, FILM COATED ORAL ONCE
Refills: 0 | Status: COMPLETED | OUTPATIENT
Start: 2021-06-04 | End: 2021-06-04

## 2021-06-04 RX ORDER — SODIUM CHLORIDE 9 MG/ML
500 INJECTION, SOLUTION INTRAVENOUS ONCE
Refills: 0 | Status: COMPLETED | OUTPATIENT
Start: 2021-06-04 | End: 2021-06-04

## 2021-06-04 RX ORDER — SODIUM CHLORIDE 9 MG/ML
1000 INJECTION, SOLUTION INTRAVENOUS ONCE
Refills: 0 | Status: COMPLETED | OUTPATIENT
Start: 2021-06-04 | End: 2021-06-04

## 2021-06-04 RX ORDER — SODIUM CHLORIDE 9 MG/ML
500 INJECTION INTRAMUSCULAR; INTRAVENOUS; SUBCUTANEOUS ONCE
Refills: 0 | Status: COMPLETED | OUTPATIENT
Start: 2021-06-04 | End: 2021-06-04

## 2021-06-04 RX ADMIN — SODIUM CHLORIDE 1000 MILLILITER(S): 9 INJECTION, SOLUTION INTRAVENOUS at 16:33

## 2021-06-04 RX ADMIN — SODIUM CHLORIDE 1000 MILLILITER(S): 9 INJECTION, SOLUTION INTRAVENOUS at 14:09

## 2021-06-04 NOTE — ED PROVIDER NOTE - CLINICAL SUMMARY MEDICAL DECISION MAKING FREE TEXT BOX
Likely viral syndrome given no focal infx sxs, vitals reassuring, will send sepsis workup and rvp given pts chronic med condition, fluids, rectal temp. Reassess.

## 2021-06-04 NOTE — ED PROVIDER NOTE - ATTENDING CONTRIBUTION TO CARE
I have personally performed a face to face medical and diagnostic evaluation of the patient. I have discussed with and reviewed the Resident's note and agree with the History, ROS, Physical Exam and MDM unless otherwise indicated. A brief summary of my personal evaluation and impression can be found below.    51F hx of CP completed covid vaccine in April presents with a cc of cough, NB productive a/w fever, chills, malaise, feels as if is having post nasal drip like sensation, no meds prior to ED arrival has neurogenic bladder she straight caths self, for which she takes keflex at baseline for, for years. No rash. no new LE swelling, no prior hx of dvt/pe. Also noted new cold sore eruption to face over last few days. Denies n/v/cp/sob. Denies headache, syncope, lightheadedness, dizziness. Denies chest palpitations, abdominal pain. Denies edema. Denies dysuria, hematuria, BRBPR, tarry stools, diarrhea, constipation.     All other ROS negative, except as above and as per HPI and ROS section.    VITALS: Initial triage and subsequent vitals have been reviewed by me.  GEN APPEARANCE: WDWN, alert, non-toxic, NAD  HEAD: Atraumatic.  EYES: PERRLa, EOMI, vision grossly intact.   NECK: Supple  CV: RRR, S1S2, no c/r/m/g. Cap refill <2 seconds. No bruits.   LUNGS: CTAB. No abnormal breath sounds.  ABDOMEN: Soft, NTND. No guarding or rebound.   MSK/EXT: No spinal or extremity point tenderness. No CVA ttp. Pelvis stable. No peripheral edema.  NEURO: Alert, follows commands. Motor and sensation at baseline per pt.   SKIN: Warm, dry and intact. No rash.  PSYCH: Appropriate    Plan/MDM: 51F hx of CP completed covid vaccine in April presents with a cc of cough, NB productive a/w fever, chills, malaise, feels as if is having post nasal drip like sensation, no meds prior to ED arrival has neurogenic bladder she straight caths self, for which she takes keflex at baseline for, for years. exam vss non toxic non focal exam ddx c/f pna v uri/covid? vs allergic rhinitis? noted mild tachycardia, low grade oral temp, will get ed sepsis bundle labs cxr ua, eval for source, reassess thereafter.

## 2021-06-04 NOTE — ED ADULT TRIAGE NOTE - CHIEF COMPLAINT QUOTE
cough, congestion, sore throat, fever, chills, body aches x 5 days  denies SOB, denies loss of taste/ smell

## 2021-06-04 NOTE — ED PROVIDER NOTE - OBJECTIVE STATEMENT
51F PMH CP (wheelchair bound), neurogenic bladder (straight cath's for urine), presents to the ED with sore throat, body aches, fever and cough for 5 days, states she took tylenol with some relief of symptoms, didn't take any prior to arrival. Had covid vaccine in april. States she has some decreased PO intake. Denies any chest pain, sob, lightheadedness, palpitations, hemoptysis, abd pain, n/v/d, dark/bloody stools, rash.

## 2021-06-04 NOTE — ED ADULT NURSE NOTE - NSIMPLEMENTINTERV_GEN_ALL_ED
Implemented All Universal Safety Interventions:  [place] to call system. Call bell, personal items and telephone within reach. Instruct patient to call for assistance. Room bathroom lighting operational. Non-slip footwear when patient is off stretcher. Physically safe environment: no spills, clutter or unnecessary equipment. Stretcher in lowest position, wheels locked, appropriate side rails in place.

## 2021-06-04 NOTE — ED PROVIDER NOTE - PHYSICAL EXAMINATION
GENERAL: no acute distress, non-toxic appearing  HEENT: normal conjunctiva, oral mucosa moist with sores at corner mouth and left chin, oropharynx nonerythematous/no exudates  CARDIAC: tachy rate and regular rhythm  PULM: clear to ascultation bilaterally, sats 99% on RA, no incr wob  GI: abdomen nondistended with mass felt lower abd (pt has known fibroids), soft, nontender  : no suprapubic tenderness  NEURO: alert and oriented x 3, normal speech, contracted upper and lower extremities  MSK: no visible deformities, no peripheral edema, pt with bilateral lower ext swelling (baseline per pt)  SKIN: no cellulitic rashes/no sacral decub  PSYCH: appropriate mood and affect

## 2021-06-04 NOTE — ED PROVIDER NOTE - PATIENT PORTAL LINK FT
You can access the FollowMyHealth Patient Portal offered by [facility] by registering at the following website: [email or web address]. By joining [facility]’s FollowMyHealth portal, you will also be able to view your health information using other applications (apps) compatible with our system.

## 2021-06-04 NOTE — ED PROVIDER NOTE - PROGRESS NOTE DETAILS
Alycia, PGY2: pt without any focal infx symptoms, negative workup thus far, vitals reassuring, pt states she feels good, will send dimer as WELLS score 3, if negative comfortable sending pt home Alycia, PGY2: pt states she can't take oral meds valcyte dc'd, she doesn't want fluids, f/u dimer Patient declining CTA chest.  States she does not want contrast and wants to go home.  D-dimer elevated and explained possibility of PE.  Unlikely PE given clinical scenario although cannot rule out.  Explained this to patient and she is still declining CT.  Will dc Valerie CASTAÑEDA: (Back Charting) Pt signed out to my colleague Dr. ARLENE Busby pending CTA, found pos d dimer clinical reassessment thereafter.

## 2021-06-04 NOTE — ED ADULT NURSE REASSESSMENT NOTE - NS ED NURSE REASSESS COMMENT FT1
Pt offered PO Valtrex ordered by ED Attending Valerie CASTAÑEDA. Pt states that as per ID doctor, pt cannot take pills, but should only take lotions. ED Attending Valerie CASTAÑEDA aware. As per ED attending, due to normal heart rate, okay to not provide pt more IV fluids. Pt educated on plan of care regarding pending d-dimer results.

## 2021-06-04 NOTE — ED ADULT NURSE NOTE - OBJECTIVE STATEMENT
Female 51 years old with past medical history of Cerebral Palsy, wheel chair bound came in for sore throat, cough, body aches and intermittent fever for couple  for 5 days. Denies chest pain or sob, nausea, vomiting or urinary symptoms. Female 51 years old with past medical history of Cerebral Palsy, wheel chair bound came in for sore throat, cough, body aches and intermittent fever for couple  for 5 days. Denies chest pain or sob, nausea, vomiting or urinary symptoms. Pt has neurogenic bladder and does self catheterization. Had completed Covid vaccine last April. Reports also decreased po intake. Evaluated by MD. Safety maintained.

## 2021-06-05 VITALS
SYSTOLIC BLOOD PRESSURE: 133 MMHG | TEMPERATURE: 98 F | RESPIRATION RATE: 18 BRPM | DIASTOLIC BLOOD PRESSURE: 83 MMHG | HEART RATE: 99 BPM | OXYGEN SATURATION: 97 %

## 2021-06-05 LAB
CULTURE RESULTS: NO GROWTH — SIGNIFICANT CHANGE UP
SPECIMEN SOURCE: SIGNIFICANT CHANGE UP

## 2021-06-10 ENCOUNTER — APPOINTMENT (OUTPATIENT)
Dept: UROLOGY | Facility: CLINIC | Age: 51
End: 2021-06-10

## 2021-06-11 ENCOUNTER — APPOINTMENT (OUTPATIENT)
Dept: INTERNAL MEDICINE | Facility: CLINIC | Age: 51
End: 2021-06-11

## 2021-06-11 DIAGNOSIS — J06.9 ACUTE UPPER RESPIRATORY INFECTION, UNSPECIFIED: ICD-10-CM

## 2021-06-11 DIAGNOSIS — J18.9 PNEUMONIA, UNSPECIFIED ORGANISM: ICD-10-CM

## 2021-06-11 NOTE — HISTORY OF PRESENT ILLNESS
[Home] : at home, [unfilled] , at the time of the visit. [Other Location: e.g. Home (Enter Location, City,State)___] : at [unfilled] [Verbal consent obtained from patient] : the patient, [unfilled] [FreeTextEntry1] : Cough [de-identified] : 51F PMH cerebral palsy/cervical myelopathy, wheelchair bound, uterine fibroids, anemia, IBS, urinary incontinence (straight catheterizes), recurrent UTIs on chronic keflex, subclinical hypothyroidism, presents for cough following recent viral infection for which she visited the ED on 6/4 and was discharged home with supportive care. Xray was negative and blood work was notable for slight leukocytosis and slightly elevated D-dimer.\par \par She calls today reporting that her symptoms have overall improved somewhat, however she has an ongoing cough that has gotten worse. Her cough is minimally productive, but she feels significant chest congestion and ear fullness. When she does produce mucous, it is thick and yellow. \par \par She reports that her fevers have improved and no longer has chest pain or shortness of breath.  She still has fatigue, but it is improving. \par She has been inhaling steam and using a saline nasal spray for her congestion.

## 2021-06-11 NOTE — REVIEW OF SYSTEMS
[Fatigue] : fatigue [Earache] : earache [Nasal Discharge] : nasal discharge [Sore Throat] : sore throat [Postnasal Drip] : postnasal drip [Cough] : cough [Fever] : no fever [Chills] : no chills [Chest Pain] : no chest pain [Shortness Of Breath] : no shortness of breath [Dyspnea on Exertion] : no dyspnea on exertion [Abdominal Pain] : no abdominal pain [Constipation] : no constipation [Diarrhea] : diarrhea [FreeTextEntry7] : 2 loose BMs daily

## 2021-06-11 NOTE — PHYSICAL EXAM
[No Acute Distress] : no acute distress [No Respiratory Distress] : no respiratory distress  [Normal Affect] : the affect was normal [Normal Insight/Judgement] : insight and judgment were intact [de-identified] : Speaking in full sentences without respiratory distress

## 2021-07-19 ENCOUNTER — NON-APPOINTMENT (OUTPATIENT)
Age: 51
End: 2021-07-19

## 2021-08-12 ENCOUNTER — APPOINTMENT (OUTPATIENT)
Dept: INFECTIOUS DISEASE | Facility: CLINIC | Age: 51
End: 2021-08-12
Payer: MEDICARE

## 2021-08-12 VITALS
WEIGHT: 120 LBS | SYSTOLIC BLOOD PRESSURE: 105 MMHG | TEMPERATURE: 98.4 F | OXYGEN SATURATION: 97 % | DIASTOLIC BLOOD PRESSURE: 59 MMHG | BODY MASS INDEX: 24.19 KG/M2 | HEART RATE: 98 BPM | HEIGHT: 59 IN

## 2021-08-12 PROCEDURE — 99213 OFFICE O/P EST LOW 20 MIN: CPT

## 2021-08-13 ENCOUNTER — APPOINTMENT (OUTPATIENT)
Dept: DERMATOLOGY | Facility: CLINIC | Age: 51
End: 2021-08-13
Payer: MEDICARE

## 2021-08-13 DIAGNOSIS — D22.9 MELANOCYTIC NEVI, UNSPECIFIED: ICD-10-CM

## 2021-08-13 DIAGNOSIS — L82.1 OTHER SEBORRHEIC KERATOSIS: ICD-10-CM

## 2021-08-13 DIAGNOSIS — D18.01 HEMANGIOMA OF SKIN AND SUBCUTANEOUS TISSUE: ICD-10-CM

## 2021-08-13 DIAGNOSIS — L23.9 ALLERGIC CONTACT DERMATITIS, UNSPECIFIED CAUSE: ICD-10-CM

## 2021-08-13 PROCEDURE — 99204 OFFICE O/P NEW MOD 45 MIN: CPT

## 2021-08-13 NOTE — PHYSICAL EXAM
[General Appearance - Alert] : alert [General Appearance - In No Acute Distress] : in no acute distress [Sclera] : the sclera and conjunctiva were normal [PERRL With Normal Accommodation] : pupils were equal in size, round, reactive to light [Extraocular Movements] : extraocular movements were intact [Outer Ear] : the ears and nose were normal in appearance [Oropharynx] : the oropharynx was normal with no thrush [Neck Appearance] : the appearance of the neck was normal [Neck Cervical Mass (___cm)] : no neck mass was observed [Jugular Venous Distention Increased] : there was no jugular-venous distention [Thyroid Diffuse Enlargement] : the thyroid was not enlarged [Auscultation Breath Sounds / Voice Sounds] : lungs were clear to auscultation bilaterally [Heart Rate And Rhythm] : heart rate was normal and rhythm regular [Heart Sounds] : normal S1 and S2 [Heart Sounds Gallop] : no gallops [Murmurs] : no murmurs [Heart Sounds Pericardial Friction Rub] : no pericardial rub [Bowel Sounds] : normal bowel sounds [Abdomen Soft] : soft [Abdomen Tenderness] : non-tender [] : no hepato-splenomegaly [Abdomen Mass (___ Cm)] : no abdominal mass palpated [Costovertebral Angle Tenderness] : no CVA tenderness [Skin Color & Pigmentation] : normal skin color and pigmentation [FreeTextEntry1] : wheelchair bound [Oriented To Time, Place, And Person] : oriented to person, place, and time [Affect] : the affect was normal

## 2021-08-13 NOTE — ASSESSMENT
[FreeTextEntry1] : 51-year-old female comes for followup visit for UTI and chronic abx prophylaxis. Her  symptoms are stable overall. \par \par Continue Keflex for chronic prophylaxis. Pt seems to be overall stable since starting daily prophylaxis\par \par Seeing Derm tomorrow for rash issues on posterior thigh area.\par \par No major urinary issues. no need for culture at this time. \par \par Got her COVID vaccine already. \par \par F/u with medical team for medical issues.  [Treatment Education] : treatment education [Treatment Adherence] : treatment adherence [Rx Dose / Side Effects] : Rx dose/side effects

## 2021-08-13 NOTE — HISTORY OF PRESENT ILLNESS
[FreeTextEntry1] : 52 y/o female comes for f/u visit. \par \par She is on long term keflex for chronic UTI. No fever.\par \par No issues with her urine.\par \par Seeing dermatology tomorrow for a rash.\par \par Go the COVID vaccine.

## 2021-08-27 ENCOUNTER — APPOINTMENT (OUTPATIENT)
Dept: SURGICAL ONCOLOGY | Facility: CLINIC | Age: 51
End: 2021-08-27

## 2021-09-02 ENCOUNTER — APPOINTMENT (OUTPATIENT)
Dept: INTERNAL MEDICINE | Facility: CLINIC | Age: 51
End: 2021-09-02

## 2021-09-03 ENCOUNTER — APPOINTMENT (OUTPATIENT)
Dept: CARDIOLOGY | Facility: CLINIC | Age: 51
End: 2021-09-03

## 2021-09-09 ENCOUNTER — APPOINTMENT (OUTPATIENT)
Dept: OPHTHALMOLOGY | Facility: CLINIC | Age: 51
End: 2021-09-09

## 2021-09-24 DIAGNOSIS — M19.019 PRIMARY OSTEOARTHRITIS, UNSPECIFIED SHOULDER: ICD-10-CM

## 2021-10-04 ENCOUNTER — APPOINTMENT (OUTPATIENT)
Dept: INTERNAL MEDICINE | Facility: CLINIC | Age: 51
End: 2021-10-04

## 2021-10-21 ENCOUNTER — NON-APPOINTMENT (OUTPATIENT)
Age: 51
End: 2021-10-21

## 2021-10-22 ENCOUNTER — APPOINTMENT (OUTPATIENT)
Dept: DERMATOLOGY | Facility: CLINIC | Age: 51
End: 2021-10-22

## 2021-10-22 ENCOUNTER — NON-APPOINTMENT (OUTPATIENT)
Age: 51
End: 2021-10-22

## 2021-10-22 ENCOUNTER — APPOINTMENT (OUTPATIENT)
Dept: CARDIOLOGY | Facility: CLINIC | Age: 51
End: 2021-10-22
Payer: MEDICARE

## 2021-10-22 VITALS
HEART RATE: 97 BPM | HEIGHT: 59 IN | OXYGEN SATURATION: 98 % | DIASTOLIC BLOOD PRESSURE: 69 MMHG | SYSTOLIC BLOOD PRESSURE: 109 MMHG

## 2021-10-22 DIAGNOSIS — R94.31 ABNORMAL ELECTROCARDIOGRAM [ECG] [EKG]: ICD-10-CM

## 2021-10-22 DIAGNOSIS — R07.89 OTHER CHEST PAIN: ICD-10-CM

## 2021-10-22 PROCEDURE — 99204 OFFICE O/P NEW MOD 45 MIN: CPT

## 2021-10-22 PROCEDURE — 93000 ELECTROCARDIOGRAM COMPLETE: CPT

## 2021-10-22 NOTE — HISTORY OF PRESENT ILLNESS
[FreeTextEntry1] : 51-year-old female being seen in cardiac evaluation because of an episode of rapid heart beating and chest discomfort which occurred about 2 months ago.  She has a history of cerebral palsy and a cervical myelopathy and is functionally quadriplegic and confined to a wheelchair.  She was sitting in front of a computer and experienced rapid heart beating along with the sensation that "her chest was dropping".  She felt faint and little short of breath and sat there until the episode abated after about 10 minutes.  It has not recurred.  She has a prior history of palpitations and was seen in evaluation in 2014 by cardiology.  A Holter done showed only a few APCs and VPCs.  She has had sporadic palpitations ever since.\par \par She is also mildly hypercholesterolemic with an LDL of 130 and an A1c of 5.2 on her most recent blood work.  She reports there is a family history of hypercholesterolemia with both parents having it and her father has CAD.  She has a neurogenic bladder and has had recurrent urinary tract infections.

## 2021-10-22 NOTE — DISCUSSION/SUMMARY
[FreeTextEntry1] : In summary,  is a 51-year-old female with a history of an episode of rapid heart beating and chest discomfort which occurred about 2 months ago.  She is currently asymptomatic.  Her exam shows regular rhythm, normal blood pressure, clear lungs, and a normal cardiac exam.  Her EKG shows diffuse ST-T wave changes.  These have also been noted in the past.\par \par The cause of the episode is unclear, but since it has not recurred I do not think monitoring is indicated.  She does not appear to have had an echo done in the past, but with an abnormal baseline EKG she should and it will be scheduled.  We also discussed starting a statin for her mildly elevated LDL cholesterol.  She has had a lot of side effects from antibiotics and would prefer to avoid beginning for now.

## 2021-10-22 NOTE — REVIEW OF SYSTEMS
[Feeling Fatigued] : feeling fatigued [Lower Ext Edema] : lower extremity edema [Palpitations] : palpitations [Abdominal Pain] : abdominal pain [Constipation] : constipation [Dysuria] : dysuria [Tremor] : a tremor was seen [Limb Weakness (Paresis)] : limb weakness (Paresis) [Negative] : Heme/Lymph

## 2021-10-29 ENCOUNTER — RESULT REVIEW (OUTPATIENT)
Age: 51
End: 2021-10-29

## 2021-10-29 ENCOUNTER — OUTPATIENT (OUTPATIENT)
Dept: OUTPATIENT SERVICES | Facility: HOSPITAL | Age: 51
LOS: 1 days | End: 2021-10-29
Payer: MEDICARE

## 2021-10-29 ENCOUNTER — APPOINTMENT (OUTPATIENT)
Dept: ULTRASOUND IMAGING | Facility: IMAGING CENTER | Age: 51
End: 2021-10-29
Payer: MEDICARE

## 2021-10-29 ENCOUNTER — APPOINTMENT (OUTPATIENT)
Dept: SURGICAL ONCOLOGY | Facility: CLINIC | Age: 51
End: 2021-10-29
Payer: MEDICARE

## 2021-10-29 ENCOUNTER — APPOINTMENT (OUTPATIENT)
Dept: MAMMOGRAPHY | Facility: IMAGING CENTER | Age: 51
End: 2021-10-29
Payer: MEDICARE

## 2021-10-29 VITALS
DIASTOLIC BLOOD PRESSURE: 78 MMHG | OXYGEN SATURATION: 97 % | BODY MASS INDEX: 24.19 KG/M2 | TEMPERATURE: 97.2 F | SYSTOLIC BLOOD PRESSURE: 121 MMHG | HEIGHT: 59 IN | WEIGHT: 120 LBS | RESPIRATION RATE: 16 BRPM | HEART RATE: 86 BPM

## 2021-10-29 DIAGNOSIS — Z00.8 ENCOUNTER FOR OTHER GENERAL EXAMINATION: ICD-10-CM

## 2021-10-29 DIAGNOSIS — Z98.890 OTHER SPECIFIED POSTPROCEDURAL STATES: Chronic | ICD-10-CM

## 2021-10-29 PROCEDURE — 76775 US EXAM ABDO BACK WALL LIM: CPT | Mod: 26

## 2021-10-29 PROCEDURE — 76641 ULTRASOUND BREAST COMPLETE: CPT | Mod: 26,50

## 2021-10-29 PROCEDURE — 99214 OFFICE O/P EST MOD 30 MIN: CPT

## 2021-10-29 PROCEDURE — 77063 BREAST TOMOSYNTHESIS BI: CPT | Mod: 26

## 2021-10-29 PROCEDURE — 77067 SCR MAMMO BI INCL CAD: CPT | Mod: 26

## 2021-10-29 PROCEDURE — 77067 SCR MAMMO BI INCL CAD: CPT

## 2021-10-29 PROCEDURE — 76775 US EXAM ABDO BACK WALL LIM: CPT

## 2021-10-29 PROCEDURE — 76641 ULTRASOUND BREAST COMPLETE: CPT

## 2021-10-29 PROCEDURE — 77063 BREAST TOMOSYNTHESIS BI: CPT

## 2021-10-29 NOTE — PHYSICAL EXAM
[Normal] : supple, no neck mass and thyroid not enlarged [Normal Neck Lymph Nodes] : normal neck lymph nodes  [Normal Supraclavicular Lymph Nodes] : normal supraclavicular lymph nodes [Normal Groin Lymph Nodes] : normal groin lymph nodes [Normal Axillary Lymph Nodes] : normal axillary lymph nodes [Normal] : oriented to person, place and time, with appropriate affect [de-identified] : No dominant masses or adenopathy bilaterally.

## 2021-10-29 NOTE — ADDENDUM
[FreeTextEntry1] : I, Sasha De Los Santos, acted solely as a scribe for Dr. Nav Garza on this date 10/29/2021.\par

## 2021-10-29 NOTE — HISTORY OF PRESENT ILLNESS
[de-identified] : 50 y/o female presents for a follow-up visit. \par History multiple bilateral benign US guided needle biopsies (fibrocystic changes and fibroadenomas) \par \par Recent bilateral mammo/sono performed on 10/29/21 showed no mammographic or sonographic evidence of malignancy. (BI-RADS 2)\par \par MMG/US 8/14/20: BI-RADS 2\par Extremely dense breasts. No mammographic evidence of malignancy. No significant change. No sonographic evidence of malignancy.\par \par US 2/5/20: BI-RADS 3\par At 7:00, 8 cm from the nipple, there is a 0.5 x 0.2 x 0.6 cm circumscribed hypoechoic mass, stable since 05/14/2019. \par At 3:00, 3 cm from the nipple, there is a 0.8 x 0.2 x 3.5 cm probable cyst versus hypoechoic mass, stable since 05/14/2019. \par At 3:00, 4 cm from the nipple, there is a 0.4 x 0.2 x 0.3 cm circumscribed hypoechoic mass, stable since 05/14/2019.\par Stable bilateral circumscribed hypoechoic masses, probably benign, for which six-month ultrasound follow-up in May 2020 at time of annual mammogram is recommended.\par \par Bilateral MMG/US 5/14/19: Multiple bilateral cysts and nodules bilaterally. Right breast 12:00 4 cm fn: newly noted 0.5 cm circumscribed hypoechoic mass. Newly noted circumscribed hypoechoic masses in left breast at 3:00 4 cm fn, 3:00 3 cm fn, and 7:00 8 cm fn. No evidence of malignancy. BIRADS-3. Mammogram/sonogram recommended in one year. \par \par Pt notes she got extremely sick in November, diagnosed with HPMV. \par Pt has CP and presents in a wheelchair. \par Denies personal or family history of breast or ovarian cancer.

## 2021-11-05 ENCOUNTER — LABORATORY RESULT (OUTPATIENT)
Age: 51
End: 2021-11-05

## 2021-11-05 ENCOUNTER — APPOINTMENT (OUTPATIENT)
Dept: INFECTIOUS DISEASE | Facility: CLINIC | Age: 51
End: 2021-11-05

## 2021-11-05 ENCOUNTER — APPOINTMENT (OUTPATIENT)
Dept: OBGYN | Facility: CLINIC | Age: 51
End: 2021-11-05
Payer: MEDICARE

## 2021-11-05 ENCOUNTER — MED ADMIN CHARGE (OUTPATIENT)
Age: 51
End: 2021-11-05

## 2021-11-05 VITALS
WEIGHT: 120 LBS | HEIGHT: 59 IN | SYSTOLIC BLOOD PRESSURE: 108 MMHG | BODY MASS INDEX: 24.19 KG/M2 | DIASTOLIC BLOOD PRESSURE: 69 MMHG

## 2021-11-05 PROCEDURE — 99396 PREV VISIT EST AGE 40-64: CPT | Mod: GY

## 2021-11-05 PROCEDURE — 99213 OFFICE O/P EST LOW 20 MIN: CPT | Mod: 25

## 2021-11-05 NOTE — PHYSICAL EXAM
[Chaperone Present] : A chaperone was present in the examining room during all aspects of the physical examination [Appropriately responsive] : appropriately responsive [Alert] : alert [No Acute Distress] : no acute distress [Oriented x3] : oriented x3 [Labia Majora] : normal [Labia Minora] : normal [Moderate] : There was moderate vaginal bleeding [Normal] : normal [Uterine Adnexae] : normal [Enlarged ___ wks] : enlarged [unfilled] ~Uweeks [FreeTextEntry1] : FLORENCIO Cast [Soft] : soft [Non-tender] : non-tender [FreeTextEntry7] : 22week mobile fibriod uterus [FreeTextEntry6] : 22 wk size uterus. Mobile.

## 2021-11-05 NOTE — REVIEW OF SYSTEMS
[Abn Vaginal bleeding] : abnormal vaginal bleeding [Genital Rash/Irritation] : genital rash/irritation [Negative] : Breast [FreeTextEntry8] : Vaginal burning and vulvar itching

## 2021-11-05 NOTE — DISCUSSION/SUMMARY
[FreeTextEntry1] : health care maintenance\par -pap\par -vit D/BMD f/u pcp for osteo screening\par -breast mammo/sono by with breast surgeon yearly\par -colon screening reviewed\par -f/u PCP for annual and appropriate immunizations\par -rto annually\par \par h/o chronic yeast infection/bv\par -affirm cx\par -rx Terazol ordered prn\par -rx Nystatin ordered prn\par \par irregular menses\par -hormone labs collected on 5/21/21 WNL\par -recommended f/u endometrial biopsy\par -MRI rx sent\par \par h/o uterine fibroids- on exam: 22 wk size uterus; mobile.\par -pelvis MRI w/ contrast ordered; pt will consider MRI but is hesitant due to possible contrast reaction although never had it before\par -pt to discuss further with PCP regarding allergies due to contrast\par -Discussed the option of continued conservative observation of fibroids vs surgical removal. Treatment options of hysterectomy, ufe and continued observation were also outlined.  Detailed counseling regarding hysterectomy was also held and the risks, benefits, and alternatives provided. All questions were answered and pt to notify.\par -advised rto for discussion of MRI results and endometrial biopsy

## 2021-11-05 NOTE — END OF VISIT
[FreeTextEntry3] : I, Daisy Smyth solely acted as a scribe for Dr. Hernandez Abdalla on 11/05/2021 . All medical entries made by the scribe were at my, Dr. Abdalla's, direction and personally dictated by me on 11/05/2021 . I have reviewed the chart and agree that the record accurately reflects my personal performance of the history, physical exam, assessment and plan. I have also personally directed, reviewed, and agreed with the chart.

## 2021-11-05 NOTE — HISTORY OF PRESENT ILLNESS
[Patient reported PAP Smear was normal] : Patient reported PAP Smear was normal [FreeTextEntry1] : Patient is a 50 yo with pmhx sig for cerebral palsy/cervical myelopathy, wheelchair bound, uterine fibroids, anemia, IBS, urinary incontinence (straight catheterizes), recurrent UTIs on chronic keflex, subclinical hypothyroidism, presents for her annual gyn exam. Pt complaints of vaginal burning and vulvar itching x 4 weeks. \par \par LMP: 10/2021. Pt has irregular menses.\par \par Pt also complaints of occasional abdominal soreness which she attributes to uterine fibroids.\par \par US Pelvis Complete 04/23/2021\par \par INTERPRETATION:  CLINICAL INFORMATION: Abnormal vaginal bleeding. Fibroids\par \par LMP: Unsure\par \par COMPARISON: 9/3/2020\par \par TECHNIQUE:\par Transabdominal pelvic sonogram only.\par \par FINDINGS:\par \par Uterus: 12.3 cm x 5.0 cm x 7.2 cm. fibroids right intramural myoma measures 3.8 x 3.1 x 4.0 cm. Lower uterine segment, measures 8.9 x 6.0 x 5.6 cm. Pedunculated fundal myoma measures 6.1 x 5.3 x 7.5 cm.\par Endometrium: 5 mm. Within normal limits.\par \par Right ovary: 1.4 cm x 0.9 cm x 1.3 cm. Within normal limits. Left ovary: Not seen. There is no adnexal mass\par \par Fluid: None.\par \par IMPRESSION:\par Large uterine fibroids. Unremarkable right ovary. Left ovary is again not seen [TextBox_4] : Pt here for annual, pt stated she had her breast exam last week, does not need it today. Heavy irregular bleeding, burning, external itching, skin rash - candida. [PapSmeardate] : 2020

## 2021-11-05 NOTE — COUNSELING
[Nutrition/ Exercise/ Weight Management] : nutrition, exercise, weight management [Medication Management] : medication management [Other ___] : [unfilled]

## 2021-11-11 ENCOUNTER — NON-APPOINTMENT (OUTPATIENT)
Age: 51
End: 2021-11-11

## 2021-11-12 ENCOUNTER — APPOINTMENT (OUTPATIENT)
Dept: INTERNAL MEDICINE | Facility: CLINIC | Age: 51
End: 2021-11-12
Payer: MEDICARE

## 2021-11-12 ENCOUNTER — APPOINTMENT (OUTPATIENT)
Dept: OTOLARYNGOLOGY | Facility: CLINIC | Age: 51
End: 2021-11-12
Payer: MEDICARE

## 2021-11-12 ENCOUNTER — NON-APPOINTMENT (OUTPATIENT)
Age: 51
End: 2021-11-12

## 2021-11-12 ENCOUNTER — APPOINTMENT (OUTPATIENT)
Dept: ENDOCRINOLOGY | Facility: CLINIC | Age: 51
End: 2021-11-12
Payer: MEDICARE

## 2021-11-12 ENCOUNTER — OUTPATIENT (OUTPATIENT)
Dept: OUTPATIENT SERVICES | Facility: HOSPITAL | Age: 51
LOS: 1 days | End: 2021-11-12
Payer: MEDICARE

## 2021-11-12 VITALS
HEART RATE: 93 BPM | TEMPERATURE: 97.8 F | WEIGHT: 120 LBS | SYSTOLIC BLOOD PRESSURE: 110 MMHG | DIASTOLIC BLOOD PRESSURE: 61 MMHG | HEIGHT: 59 IN | BODY MASS INDEX: 24.19 KG/M2

## 2021-11-12 VITALS
DIASTOLIC BLOOD PRESSURE: 70 MMHG | WEIGHT: 120 LBS | BODY MASS INDEX: 24.19 KG/M2 | TEMPERATURE: 97.9 F | OXYGEN SATURATION: 98 % | HEART RATE: 90 BPM | SYSTOLIC BLOOD PRESSURE: 110 MMHG | HEIGHT: 59 IN

## 2021-11-12 VITALS
DIASTOLIC BLOOD PRESSURE: 70 MMHG | HEART RATE: 92 BPM | HEIGHT: 59 IN | BODY MASS INDEX: 24.19 KG/M2 | SYSTOLIC BLOOD PRESSURE: 112 MMHG | WEIGHT: 120 LBS | OXYGEN SATURATION: 98 %

## 2021-11-12 DIAGNOSIS — Z98.890 OTHER SPECIFIED POSTPROCEDURAL STATES: Chronic | ICD-10-CM

## 2021-11-12 DIAGNOSIS — I10 ESSENTIAL (PRIMARY) HYPERTENSION: ICD-10-CM

## 2021-11-12 PROCEDURE — 99214 OFFICE O/P EST MOD 30 MIN: CPT

## 2021-11-12 PROCEDURE — G0439: CPT

## 2021-11-12 PROCEDURE — 92557 COMPREHENSIVE HEARING TEST: CPT

## 2021-11-12 PROCEDURE — 99213 OFFICE O/P EST LOW 20 MIN: CPT | Mod: 25

## 2021-11-12 PROCEDURE — G0439: CPT | Mod: GC

## 2021-11-12 PROCEDURE — 69210 REMOVE IMPACTED EAR WAX UNI: CPT

## 2021-11-12 PROCEDURE — 92567 TYMPANOMETRY: CPT

## 2021-11-12 NOTE — ASSESSMENT
[FreeTextEntry1] : Patient here for routine ear cleaning, otherwise doing well \par \par Wax:\par -Cerumen is removed from the right and left  ear canal with a curette and suction.\par -Rx:Debrox be placed in both ears on a routine basis to keep them free of wax.\par -Routine debridement suggested to keep the ears free of wax.\par \par audio-wnl\par \par f/u 6 months or prn

## 2021-11-12 NOTE — PHYSICAL EXAM
[Nasal Endoscopy Performed] : nasal endoscopy was performed, see procedure section for findings [] : septum deviated bilaterally [Midline] : trachea located in midline position [Normal] : no rashes [de-identified] : bilateral cerumen [de-identified] : congested

## 2021-11-12 NOTE — HISTORY OF PRESENT ILLNESS
[No] : patient does not have a  history of radiation therapy [Otalgia] : otalgia [Nasal Congestion] : nasal congestion [None] : No risk factors have been identified. [de-identified] : 52 y/o female with hx of severe cerumen impaction presents for routine cleaning. Otherwise feeling well. \par neurologic disorder requiring wheelchair confinement\par no throat complaints\par no modifying factors\par  [Anxiety] : no anxiety [Dizziness] : no dizziness [Headache] : no headache [Hearing Loss] : no hearing loss [Recurrent Otitis Media] : no recurrent otitis media [Otitis Media with Effusion] : no otitis media with effusion [Presbycusis] : no presbycusis [Congenital Ear Malformation] : no congenital ear malformation [Meniere Disease] : no Meniere disease [Otosclerosis] : no otosclerosis [Perilymphatic Fistula] : no perilymphatic fistula [Hypertension] : no hypertension [Loud Noise Exposure] : no history of loud noise exposure [Smoking] : no smoking [Early Onset Hearing Loss] : no early onset hearing loss [Stroke] : no stroke [Facial Pain] : no facial pain [Facial Pressure] : no facial pressure [Diplopia] : no diplopia [Ear Fullness] : no ear fullness [Allergic Rhinitis] : no allergic rhinitis [Maxillary Tooth Infection] : no maxillary tooth infection [Septal Deviation] : no septal deviation [Environmental Allergies] : no environmental allergies [Seasonal Allergies] : no seasonal allergies [Environmental Allergens] : no environmental allergens [Adenoidectomy] : no adenoidectomy [Nasal FB Removal] : no nasal foreign body removal [Allergies] : no allergies [Asthma] : no asthma [Neck Mass] : no neck mass [Neck Pain] : no neck pain [Chills] : no chills [Cold Intolerance] : no cold intolerance [Cough] : no cough [Fatigue] : no fatigue [Heat Intolerance] : no heat intolerance [Hyperthyroidism] : no hyperthyroidism [Sialadenitis] : no sialadenitis [Hodgkin Disease] : no hodgkin disease [Non-Hodgkin Lymphoma] : no non-hodgkin lymphoma [Tobacco Use] : no tobacco use [Alcohol Use] : no alcohol use [Graves Disease] : no graves disease [Thyroid Cancer] : no thyroid cancer

## 2021-11-12 NOTE — DATA REVIEWED
[de-identified] : Hearing Test performed to evaluate the extent of hearing loss and  to explain pt's symptoms\par today's hearing test was personally reviewed and revealed\par Type As tymps in each ear and hearing is WNL bilaterally.

## 2021-11-13 NOTE — ASSESSMENT
[FreeTextEntry1] : 51 y.o. female with h/o hyperprolactinemia, subclinical hypothyroidism, and vitamin D def.\par \par 1. Hyperprolactinemia- Suspect pituitary microadenoma. Since getting menses and no galactorrhea, have been monitoring given medication sensitivity. If prolactin level is stable, will continue to monitor. Will check serum prolactin and sex hormones. \par \par 2. Subclinical hypothyroidism- Will check TFTs and if stable, will continue to monitor.\par \par 3. Vitamin D def- Will check 25 vitamin D level and will continue supplement. Regarding bone health, DEXA scan shows normal BMD. Will monitor for now and repeat in several years.\par \par 4. Anemia- Will check CBC, iron studies and vitamin B12 with folate.\par \par 5. Hyperlipidemia- Encouraged low fat diet. Will check lipids. \par \par If stable, follow up in 6 months\par Agree with cardiology follow up

## 2021-11-13 NOTE — REVIEW OF SYSTEMS
[Fatigue] : fatigue [Irregular Menses] : irregular menses [Joint Pain] : joint pain [Dry Skin] : dry skin [Hair Loss] : hair loss [Headaches] : headaches [Hot Flashes] : hot flashes [Swelling] : swelling [Negative] : Respiratory [Recent Weight Gain (___ Lbs)] : recent weight gain: [unfilled] lbs [Constipation] : no constipation [Polydipsia] : no polydipsia [FreeTextEntry7] : diarrhea on and off

## 2021-11-13 NOTE — PHYSICAL EXAM
[Alert] : alert [No Acute Distress] : no acute distress [Normal Sclera/Conjunctiva] : normal sclera/conjunctiva [EOMI] : extra ocular movement intact [No LAD] : no lymphadenopathy [Thyroid Not Enlarged] : the thyroid was not enlarged [No Thyroid Nodules] : no palpable thyroid nodules [No Respiratory Distress] : no respiratory distress [Clear to Auscultation] : lungs were clear to auscultation bilaterally [Normal S1, S2] : normal S1 and S2 [Regular Rhythm] : with a regular rhythm [Normal Bowel Sounds] : normal bowel sounds [Not Tender] : non-tender [Not Distended] : not distended [Soft] : abdomen soft [Normal Anterior Cervical Nodes] : no anterior cervical lymphadenopathy [No Clubbing, Cyanosis] : no clubbing  or cyanosis of the fingernails [No Rash] : no rash [No Tremors] : no tremors [Normal Affect] : the affect was normal [Normal Mood] : the mood was normal [Acanthosis Nigricans] : no acanthosis nigricans [de-identified] : b/l edema [de-identified] : contracted UE B/L

## 2021-11-13 NOTE — HISTORY OF PRESENT ILLNESS
[FreeTextEntry1] : 51 y.o. female with h/o CP, hyperprolactinemia and subclinical hypothyroidism here for follow up visit. She did get both doses of COVID-19 vaccine ([employer]). She had viral syndrome in May 2021 and went to the hospital. Reports irregular menses and follows with GYN. Reports heavy and long menstrual cycle. Does have fibroids. C/o dry skin. Had avulsion fracture in the past. Last brain MRI in June 2014 showed no obvious pituitary lesion but was noncontrast. Does get headaches but not severe. No galactorrhea. C/o hair loss which is worse. Has been taking iron 325 mg daily. Dealing with chronic UTIs and BV infection. Follows with ID. Does have daily urine catherization. Recently saw cardiology for palpitations. \par \par In regards to thyroid disease (subclinical hypothyroidism), did not tolerate treatment with T4 in the past. C/o fatigue. No neck complaints. Reports weight gain. Eating less bagels but too much cheese.  No PT now. \par \par Regarding bone health, wheel chair bound therefore unable to perform weight bearing activity. Had DEXA scan in November 2017 showing spine -0.9 (arthritic changes) , left femoral neck -0.5, total hip -0.6 and 1/3 radius 0.2. Takes vitamin D3 5,000 IU daily. Eats leafy greens and cheese. No falls.

## 2021-11-15 LAB
25(OH)D3 SERPL-MCNC: 49.8 NG/ML
ALBUMIN SERPL ELPH-MCNC: 4.2 G/DL
ALP BLD-CCNC: 69 U/L
ALT SERPL-CCNC: 14 U/L
ANION GAP SERPL CALC-SCNC: 13 MMOL/L
AST SERPL-CCNC: 13 U/L
BASOPHILS # BLD AUTO: 0.05 K/UL
BASOPHILS NFR BLD AUTO: 0.8 %
BILIRUB SERPL-MCNC: 0.2 MG/DL
BUN SERPL-MCNC: 13 MG/DL
CALCIUM SERPL-MCNC: 9.6 MG/DL
CHLORIDE SERPL-SCNC: 103 MMOL/L
CHOLEST SERPL-MCNC: 203 MG/DL
CO2 SERPL-SCNC: 21 MMOL/L
COVID-19 SPIKE DOMAIN ANTIBODY INTERPRETATION: POSITIVE
CREAT SERPL-MCNC: 0.33 MG/DL
EOSINOPHIL # BLD AUTO: 0.12 K/UL
EOSINOPHIL NFR BLD AUTO: 2 %
ESTRADIOL SERPL-MCNC: 159 PG/ML
FERRITIN SERPL-MCNC: 25 NG/ML
FOLATE SERPL-MCNC: 8 NG/ML
FSH SERPL-MCNC: 6.7 IU/L
GLUCOSE SERPL-MCNC: 85 MG/DL
HCT VFR BLD CALC: 37.3 %
HDLC SERPL-MCNC: 82 MG/DL
HGB BLD-MCNC: 12 G/DL
IMM GRANULOCYTES NFR BLD AUTO: 0.5 %
IRON SATN MFR SERPL: 38 %
IRON SERPL-MCNC: 127 UG/DL
LDLC SERPL CALC-MCNC: 111 MG/DL
LH SERPL-ACNC: 4.5 IU/L
LYMPHOCYTES # BLD AUTO: 1.95 K/UL
LYMPHOCYTES NFR BLD AUTO: 31.9 %
MAN DIFF?: NORMAL
MCHC RBC-ENTMCNC: 29.7 PG
MCHC RBC-ENTMCNC: 32.2 GM/DL
MCV RBC AUTO: 92.3 FL
MONOCYTES # BLD AUTO: 0.45 K/UL
MONOCYTES NFR BLD AUTO: 7.4 %
NEUTROPHILS # BLD AUTO: 3.51 K/UL
NEUTROPHILS NFR BLD AUTO: 57.4 %
NONHDLC SERPL-MCNC: 120 MG/DL
PLATELET # BLD AUTO: 246 K/UL
POTASSIUM SERPL-SCNC: 4.1 MMOL/L
PROLACTIN SERPL-MCNC: 170 NG/ML
PROT SERPL-MCNC: 6.7 G/DL
RBC # BLD: 4.04 M/UL
RBC # FLD: 13.5 %
SARS-COV-2 AB SERPL IA-ACNC: >250 U/ML
SODIUM SERPL-SCNC: 136 MMOL/L
T4 FREE SERPL-MCNC: 1 NG/DL
THYROGLOB AB SERPL-ACNC: <20 IU/ML
THYROPEROXIDASE AB SERPL IA-ACNC: 28.6 IU/ML
TIBC SERPL-MCNC: 337 UG/DL
TRIGL SERPL-MCNC: 47 MG/DL
TSH SERPL-ACNC: 2.64 UIU/ML
UIBC SERPL-MCNC: 210 UG/DL
VIT B12 SERPL-MCNC: >2000 PG/ML
WBC # FLD AUTO: 6.11 K/UL

## 2021-11-15 NOTE — PHYSICAL EXAM
[Pedal Pulses Present] : the pedal pulses are present [No Extremity Clubbing/Cyanosis] : no extremity clubbing/cyanosis [Soft] : abdomen soft [Non Tender] : non-tender [Non-distended] : non-distended [Normal Bowel Sounds] : normal bowel sounds [No Joint Swelling] : no joint swelling [Normal] : affect was normal and insight and judgment were intact [de-identified] : non-pitting edema in b/l LEs (chronic) [de-identified] : chronic mildly erythematous papular rash of b/l posterior thighs; dry hands b/l [de-identified] : baseline b/l UE and LE weakness; wheelchair bound

## 2021-11-15 NOTE — ASSESSMENT
[FreeTextEntry1] : 51F w/ hx of cervical myelopathy/cerebral palsy (wheelchair bound), uterine fibroids with menorrhagia, anemia, ?IBS, chronic urinary incontinence (daily straight caths) with recurrent UTIs (on daily keflex), vaginitis, and BV, hyperprolactinemia, and subclinical hypothyroidism presenting for CPE.\par \par #Irregular menstrual periods w/ hx of uterine fibroids and menorrhagia\par - possibly 2/2 ismael-menopause vs less likely hyperprolactinemia (relatively stable levels)\par - educated on potential ismael-menopause symptoms (e.g. hot flashes, worsening bladder/vaginal issues, dry skin, etc.)\par - per GYN note, pending MRI Pelvis w/ contrast and endometrial biopsy; advised no contradiction to receiving contrast for MRI\par - pt reports wishing to hold off on elective hysterectomy for now\par -follows with Hematology and OBGYN\par -continue to monitor CBC given reports of heavy menses\par \par #EKG abnormalities / Palpitations\par - continues to have sporadic self-resolving palpitations; no active CP or SOB\par - EKG in Oct [address]-T wave changes but such abnormalities were noted in past EKGs\par - prior Holter monitor showed APCs/PVCs i n the past\par - pending TTE given no prior found\par - follows with Cards\par \par #Cervical myelopathy / cerebral palsy / functional quadriplegia\par - c/w wheelchair-use and has HHA 12 hrs/day x7 days/wk\par - c/w Tylenol PRN, massage therapy, and heat packs for chronic joint pains\par - resume [facility] when able; advised to also try increasing home exercises as able\par - follows with Neurology\par - letter of medical necessity for her supplies signed and home health aide forms completed/signed/faxed\par \par #Chronic urinary incontinence/retention c/b recurrent UTIs, stable\par - 2/2 neurogenic bladder\par - c/w straight caths daily\par - c/w prophylactic Keflex daily given hx of recurrent UTIs\par - follows with Urology and ID\par \par #Recurrent vaginitis/BV/Vaginal candidiasis\par - c/w Terconazole vaginal suppositories PRN\par - c/w metronidazole vaginal gel PRN\par \par #Hyperprolactinemia and subclinical hypothyroidism\par - Prolactin level 171 in 5/2021 (relatively stable compared to prior); per Endo, suspect pituitary microadenoma\par - TSH 4.06 and FT4 1.0 in 5/2021 (relatively stable compared to prior)\par - as per pt, follow-up TSH/FT4/Prolactin levels to be drawn by Endo later today\par - follows with Endocrinology\par \par #Thigh rash / dry hands\par - Per Derm evaluation in Aug 2021, likely 2/2 candidiasis and irritant contact dermatitis\par - c/w terbinafine 1% 2-3 times daily; advised to use more than once daily until resolves\par - advised to use moisturizers and occlusive therapies like aquaphor for dry hands; avoid harsh soaps and consider gentle baby formula soaps\par - follows with Derm\par \par #HCM\par - Immunizations: received flu vaccine on 11/5/21; received 2 doses of Pfizer COVID-19 vaccine (March/April 2021); pt wishes to defer zoster vaccine for now\par - Colon cancer screening: FIT testing on 5/16/21 was negative\par - Depression screening: PHQ-2 score of 0 today\par - Cervical cancer screening: Pap smear in 2020 was negative; ?repeated by GYN on 11/5/21\par - Breast cancer screening: BIRADS 2 on Mammogram and Breast US in 10/2021\par - Labs: pt would like to minimize exposure/blood draws, plans to have full set of labs drawn by Endocrine later today\par \par RTC in 6 months for f/u of symptoms and FIT testing, or sooner PRN\par d/w Dr. Galvan\par \par

## 2021-11-15 NOTE — HEALTH RISK ASSESSMENT
[0-4] : 0-4 [Yes] : Yes [Monthly or less (1 pt)] : Monthly or less (1 point) [1 or 2 (0 pts)] : 1 or 2 (0 points) [Never (0 pts)] : Never (0 points) [No] : In the past 12 months have you used drugs other than those required for medical reasons? No [No falls in past year] : Patient reported no falls in the past year [0] : 2) Feeling down, depressed, or hopeless: Not at all (0) [PHQ-2 Negative - No further assessment needed] : PHQ-2 Negative - No further assessment needed [] : No [de-identified] : social [Audit-CScore] : 1 [[id number]] : 0 [Reports changes in hearing] : Reports no changes in hearing [Reports changes in vision] : Reports no changes in vision [Reports changes in dental health] : Reports no changes in dental health [MammogramDate] : 10/21 [PapSmearDate] : 10/20 [PapSmearComments] : reported to be normal; had repeat on 11/5/21 [BoneDensityDate] : 11/17 [BoneDensityComments] : normal [ColonoscopyComments] : FIT testing on 5/16/21 negative [HIVDate] : 03/13 [HIVComments] : negative [HepatitisCDate] : 03/13 [HepatitisCComments] : negative [de-identified] : requires assistance [de-identified] : requires assistance

## 2021-11-15 NOTE — END OF VISIT
[] : Resident [FreeTextEntry3] : discussed preventive healthcare, covid prevention, and irregular menses

## 2021-11-15 NOTE — HISTORY OF PRESENT ILLNESS
[FreeTextEntry1] : CPE [de-identified] : 51F w/ hx of cervical myelopathy/cerebral palsy (wheelchair bound), uterine fibroids with menorrhagia, anemia, ?IBS, chronic urinary incontinence (daily straight caths) with recurrent UTIs (on daily keflex), vaginitis, and BV, hyperprolactinemia, and subclinical hypothyroidism presenting for CPE. Today, pt reports feeling stable overall, but has some questions/concerns:\par \par #COVID-19 vaccine questions\par - Pt inquired about receiving 3rd dose of COVID-19 vaccine; had concerns given that she had side effects with both her first dose and second dose. Both doses with similar side effects but second dose was more severe including chills, nausea, muscle aches\par \par #EKG abnormalities / Palpitations\par - Pt reported having episode of rapid heart beats / chest discomfort about 2 months ago, saw Cardiology in Oct 2021. EKG obtained showed diffuse ST-T wave changes but such abnormalities were noted in past EKGs; however given no prior TTE, pt was scheduled for TTE. Of note, pt has had sporadic palpitations in the past with Holter monitor showing APCs/PVCs. Reports still having occasional episodes that self-resolve within seconds. Denies CP or SOB.\par \par #Menorrhagia / Uterine fibroids\par - LMP 10/15/21, noted heavy bleeding as usual for first few days and has had on/off spotting until 11/11/21; believes it to be more irregular. Saw GYN recently on 11/5/21; was recommended for MRI Pelvis w/ contrast and endometrial biopsy, and they had discussed option of elective hysterectomy. Pt was reportedly hesitant about MRI given her extensive allergy history and never having had contrast before.\par \par #Cervical myelopathy/cerebral palsy\par - Reports not getting much exercise lately and feels she has more trouble moving her limbs. She is hoping to resume [facility] in the Spring (after the winter season due to difficulty with transportation).\par - Pt requests completion of home health aide forms and signing of letter regarding medical necessity for her supplies\par \par #Recurrent UTIs / vaginitis / BV\par - Reports at baseline, no issues lately. Remains on daily keflex for suppressive therapy\par \par #Thigh rash / dry hands\par - Pt reports seeing derm for her chronic b/l posterior thigh rashes, found to have candidiasis on KOH prep and irritant contact dermatitis likely 2/2 lysol/clorox use in toilet. Derm advised pt to avoid use of these products for cleaning and to start terbinafine 1% cream BID until resolved. Reports using it daily most of the time due to difficulty putting it on and not always having aide to help. Noted to have dry hands today, believes a certain hand soap that she used might have caused it.\par \par #HCM\par - Pt wishes to defer zoster vaccine at this time given that she has been receiving several vaccines in the past year\par - Pt wishes to obtain labs with Endo (has appointment with them after our appointment) to avoid getting stuck with needle twice\par - Pt already vaccinated for flu shot this year\par

## 2021-11-15 NOTE — HEALTH RISK ASSESSMENT
[0-4] : 0-4 [Yes] : Yes [Monthly or less (1 pt)] : Monthly or less (1 point) [1 or 2 (0 pts)] : 1 or 2 (0 points) [Never (0 pts)] : Never (0 points) [No] : In the past 12 months have you used drugs other than those required for medical reasons? No [No falls in past year] : Patient reported no falls in the past year [0] : 2) Feeling down, depressed, or hopeless: Not at all (0) [PHQ-2 Negative - No further assessment needed] : PHQ-2 Negative - No further assessment needed [] : No [de-identified] : social [Audit-CScore] : 1 [[id number]] : 0 [Reports changes in hearing] : Reports no changes in hearing [Reports changes in vision] : Reports no changes in vision [Reports changes in dental health] : Reports no changes in dental health [MammogramDate] : 10/21 [PapSmearDate] : 10/20 [PapSmearComments] : reported to be normal; had repeat on 11/5/21 [BoneDensityDate] : 11/17 [BoneDensityComments] : normal [ColonoscopyComments] : FIT testing on 5/16/21 negative [HIVDate] : 03/13 [HIVComments] : negative [HepatitisCDate] : 03/13 [HepatitisCComments] : negative [de-identified] : requires assistance [de-identified] : requires assistance

## 2021-11-15 NOTE — REVIEW OF SYSTEMS
[Fever] : no fever [Chills] : no chills [Pain] : no pain [Vision Problems] : no vision problems [Earache] : no earache [Hearing Loss] : no hearing loss [Chest Pain] : no chest pain [Shortness Of Breath] : no shortness of breath [Wheezing] : no wheezing [Cough] : no cough [Nausea] : no nausea [Constipation] : no constipation [Vomiting] : no vomiting [Melena] : no melena [Dysuria] : no dysuria [Hematuria] : no hematuria [Headache] : no headache [Dizziness] : no dizziness [FreeTextEntry5] : sporadic self-resolving palpitations; chronic LE edema b/l [FreeTextEntry7] : baseline intermittent loose stools/abdominal discomfort [FreeTextEntry8] : baseline urinary incontinence/retention; irregular menstrual periods [FreeTextEntry9] : baseline joint pains; feels a bit weaker given not doing much exercise/PT lately [de-identified] : +itchy rash on b/l posterior thighs still present; +dry hands

## 2021-11-15 NOTE — HISTORY OF PRESENT ILLNESS
[FreeTextEntry1] : CPE [de-identified] : 51F w/ hx of cervical myelopathy/cerebral palsy (wheelchair bound), uterine fibroids with menorrhagia, anemia, ?IBS, chronic urinary incontinence (daily straight caths) with recurrent UTIs (on daily keflex), vaginitis, and BV, hyperprolactinemia, and subclinical hypothyroidism presenting for CPE. Today, pt reports feeling stable overall, but has some questions/concerns:\par \par #COVID-19 vaccine questions\par - Pt inquired about receiving 3rd dose of COVID-19 vaccine; had concerns given that she had side effects with both her first dose and second dose. Both doses with similar side effects but second dose was more severe including chills, nausea, muscle aches\par \par #EKG abnormalities / Palpitations\par - Pt reported having episode of rapid heart beats / chest discomfort about 2 months ago, saw Cardiology in Oct 2021. EKG obtained showed diffuse ST-T wave changes but such abnormalities were noted in past EKGs; however given no prior TTE, pt was scheduled for TTE. Of note, pt has had sporadic palpitations in the past with Holter monitor showing APCs/PVCs. Reports still having occasional episodes that self-resolve within seconds. Denies CP or SOB.\par \par #Menorrhagia / Uterine fibroids\par - LMP 10/15/21, noted heavy bleeding as usual for first few days and has had on/off spotting until 11/11/21; believes it to be more irregular. Saw GYN recently on 11/5/21; was recommended for MRI Pelvis w/ contrast and endometrial biopsy, and they had discussed option of elective hysterectomy. Pt was reportedly hesitant about MRI given her extensive allergy history and never having had contrast before.\par \par #Cervical myelopathy/cerebral palsy\par - Reports not getting much exercise lately and feels she has more trouble moving her limbs. She is hoping to resume [facility] in the Spring (after the winter season due to difficulty with transportation).\par - Pt requests completion of home health aide forms and signing of letter regarding medical necessity for her supplies\par \par #Recurrent UTIs / vaginitis / BV\par - Reports at baseline, no issues lately. Remains on daily keflex for suppressive therapy\par \par #Thigh rash / dry hands\par - Pt reports seeing derm for her chronic b/l posterior thigh rashes, found to have candidiasis on KOH prep and irritant contact dermatitis likely 2/2 lysol/clorox use in toilet. Derm advised pt to avoid use of these products for cleaning and to start terbinafine 1% cream BID until resolved. Reports using it daily most of the time due to difficulty putting it on and not always having aide to help. Noted to have dry hands today, believes a certain hand soap that she used might have caused it.\par \par #HCM\par - Pt wishes to defer zoster vaccine at this time given that she has been receiving several vaccines in the past year\par - Pt wishes to obtain labs with Endo (has appointment with them after our appointment) to avoid getting stuck with needle twice\par - Pt already vaccinated for flu shot this year\par

## 2021-11-15 NOTE — REVIEW OF SYSTEMS
[Fever] : no fever [Chills] : no chills [Pain] : no pain [Vision Problems] : no vision problems [Earache] : no earache [Hearing Loss] : no hearing loss [Chest Pain] : no chest pain [Shortness Of Breath] : no shortness of breath [Wheezing] : no wheezing [Cough] : no cough [Nausea] : no nausea [Constipation] : no constipation [Vomiting] : no vomiting [Melena] : no melena [Dysuria] : no dysuria [Hematuria] : no hematuria [Headache] : no headache [Dizziness] : no dizziness [FreeTextEntry5] : sporadic self-resolving palpitations; chronic LE edema b/l [FreeTextEntry7] : baseline intermittent loose stools/abdominal discomfort [FreeTextEntry8] : baseline urinary incontinence/retention; irregular menstrual periods [FreeTextEntry9] : baseline joint pains; feels a bit weaker given not doing much exercise/PT lately [de-identified] : +itchy rash on b/l posterior thighs still present; +dry hands

## 2021-11-15 NOTE — PHYSICAL EXAM
[Pedal Pulses Present] : the pedal pulses are present [No Extremity Clubbing/Cyanosis] : no extremity clubbing/cyanosis [Soft] : abdomen soft [Non Tender] : non-tender [Non-distended] : non-distended [Normal Bowel Sounds] : normal bowel sounds [No Joint Swelling] : no joint swelling [Normal] : affect was normal and insight and judgment were intact [de-identified] : non-pitting edema in b/l LEs (chronic) [de-identified] : chronic mildly erythematous papular rash of b/l posterior thighs; dry hands b/l [de-identified] : baseline b/l UE and LE weakness; wheelchair bound

## 2021-11-18 DIAGNOSIS — N76.0 ACUTE VAGINITIS: ICD-10-CM

## 2021-11-18 DIAGNOSIS — R53.2 FUNCTIONAL QUADRIPLEGIA: ICD-10-CM

## 2021-11-18 DIAGNOSIS — G95.9 DISEASE OF SPINAL CORD, UNSPECIFIED: ICD-10-CM

## 2021-11-18 DIAGNOSIS — R21 RASH AND OTHER NONSPECIFIC SKIN ERUPTION: ICD-10-CM

## 2021-11-18 DIAGNOSIS — E22.1 HYPERPROLACTINEMIA: ICD-10-CM

## 2021-11-18 DIAGNOSIS — G80.9 CEREBRAL PALSY, UNSPECIFIED: ICD-10-CM

## 2021-11-18 DIAGNOSIS — N39.0 URINARY TRACT INFECTION, SITE NOT SPECIFIED: ICD-10-CM

## 2021-11-18 DIAGNOSIS — Z00.00 ENCOUNTER FOR GENERAL ADULT MEDICAL EXAMINATION WITHOUT ABNORMAL FINDINGS: ICD-10-CM

## 2021-11-18 DIAGNOSIS — N92.0 EXCESSIVE AND FREQUENT MENSTRUATION WITH REGULAR CYCLE: ICD-10-CM

## 2021-11-19 ENCOUNTER — APPOINTMENT (OUTPATIENT)
Dept: CARDIOLOGY | Facility: CLINIC | Age: 51
End: 2021-11-19

## 2021-11-23 LAB
CANDIDA VAG CYTO: NOT DETECTED
G VAGINALIS+PREV SP MTYP VAG QL MICRO: NOT DETECTED
HPV HIGH+LOW RISK DNA PNL CVX: NOT DETECTED
T VAGINALIS VAG QL WET PREP: NOT DETECTED

## 2021-11-26 ENCOUNTER — NON-APPOINTMENT (OUTPATIENT)
Age: 51
End: 2021-11-26

## 2022-01-07 ENCOUNTER — APPOINTMENT (OUTPATIENT)
Dept: CARDIOLOGY | Facility: CLINIC | Age: 52
End: 2022-01-07

## 2022-02-04 ENCOUNTER — APPOINTMENT (OUTPATIENT)
Dept: CARDIOLOGY | Facility: CLINIC | Age: 52
End: 2022-02-04
Payer: MEDICARE

## 2022-02-04 PROCEDURE — 93306 TTE W/DOPPLER COMPLETE: CPT

## 2022-02-15 ENCOUNTER — APPOINTMENT (OUTPATIENT)
Dept: INFECTIOUS DISEASE | Facility: CLINIC | Age: 52
End: 2022-02-15

## 2022-04-01 ENCOUNTER — APPOINTMENT (OUTPATIENT)
Dept: PHYSICAL MEDICINE AND REHAB | Facility: CLINIC | Age: 52
End: 2022-04-01
Payer: MEDICARE

## 2022-04-01 VITALS — DIASTOLIC BLOOD PRESSURE: 66 MMHG | OXYGEN SATURATION: 99 % | SYSTOLIC BLOOD PRESSURE: 95 MMHG | HEART RATE: 81 BPM

## 2022-04-01 PROCEDURE — 99213 OFFICE O/P EST LOW 20 MIN: CPT
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
## 2022-04-01 NOTE — ASSESSMENT
[FreeTextEntry1] : - Patient requires new hospital bed and foam mattress - last one was given in 2009 and now w electrical problems- lack of bed allowing for positioning is causing increase in pain and noted edema. Also observes redness at pressure areas.\par - With spasticity- can consider botox to lumbricals bl- at this time she would prefer to be more conservative in her approach- can consider in the futurre.\par - OT- ROM, modalities, ADLs\par - W/C clinic for evaluation for new wc

## 2022-04-01 NOTE — PHYSICAL EXAM
[Normal] : Oriented to person, place, and time, insight and judgement were intact and the affect was normal [de-identified] : no distress [de-identified] : well perfused, trace edema [de-identified] : speech clear/fluent and intelligible;Motor-  bl UE 4/5 EF/EE/WE/FF; bl LEs 0/5.; bl fingers w MAS 3 spasticity at MCP inhibiting ability to extend fingers

## 2022-04-25 ENCOUNTER — NON-APPOINTMENT (OUTPATIENT)
Age: 52
End: 2022-04-25

## 2022-05-03 ENCOUNTER — NON-APPOINTMENT (OUTPATIENT)
Age: 52
End: 2022-05-03

## 2022-05-06 ENCOUNTER — APPOINTMENT (OUTPATIENT)
Dept: INTERNAL MEDICINE | Facility: CLINIC | Age: 52
End: 2022-05-06

## 2022-05-11 ENCOUNTER — APPOINTMENT (OUTPATIENT)
Dept: INFECTIOUS DISEASE | Facility: CLINIC | Age: 52
End: 2022-05-11
Payer: MEDICARE

## 2022-05-11 DIAGNOSIS — B00.1 HERPESVIRAL VESICULAR DERMATITIS: ICD-10-CM

## 2022-05-11 DIAGNOSIS — Z79.2 LONG TERM (CURRENT) USE OF ANTIBIOTICS: ICD-10-CM

## 2022-05-11 PROCEDURE — 99442: CPT | Mod: 95

## 2022-05-13 ENCOUNTER — APPOINTMENT (OUTPATIENT)
Dept: ENDOCRINOLOGY | Facility: CLINIC | Age: 52
End: 2022-05-13
Payer: MEDICARE

## 2022-05-13 ENCOUNTER — LABORATORY RESULT (OUTPATIENT)
Age: 52
End: 2022-05-13

## 2022-05-13 ENCOUNTER — APPOINTMENT (OUTPATIENT)
Dept: OTOLARYNGOLOGY | Facility: CLINIC | Age: 52
End: 2022-05-13
Payer: MEDICARE

## 2022-05-13 VITALS
SYSTOLIC BLOOD PRESSURE: 131 MMHG | DIASTOLIC BLOOD PRESSURE: 85 MMHG | HEART RATE: 85 BPM | BODY MASS INDEX: 24.19 KG/M2 | TEMPERATURE: 98.3 F | WEIGHT: 120 LBS | HEIGHT: 59 IN

## 2022-05-13 VITALS
HEART RATE: 78 BPM | OXYGEN SATURATION: 97 % | DIASTOLIC BLOOD PRESSURE: 70 MMHG | HEIGHT: 59 IN | BODY MASS INDEX: 24.24 KG/M2 | SYSTOLIC BLOOD PRESSURE: 120 MMHG | TEMPERATURE: 98.1 F

## 2022-05-13 PROCEDURE — 99213 OFFICE O/P EST LOW 20 MIN: CPT | Mod: 25

## 2022-05-13 PROCEDURE — 69210 REMOVE IMPACTED EAR WAX UNI: CPT

## 2022-05-13 PROCEDURE — 99214 OFFICE O/P EST MOD 30 MIN: CPT

## 2022-05-13 NOTE — REASON FOR VISIT
[Home] : at home, [unfilled] , at the time of the visit. [Medical Office: ([facility])___] : at the medical office located in  [Verbal consent obtained from patient] : the patient, [unfilled]

## 2022-05-13 NOTE — ASSESSMENT
[FreeTextEntry1] : 51-year-old female comes for followup visit for UTI and chronic abx prophylaxis. Her  symptoms are stable overall. \par \par Continue Keflex for chronic prophylaxis. Pt seems to be overall stable since starting daily prophylaxis\par \par No major urinary issues.  \par \par Got her COVID vaccine already. \par \par F/u with medical team and GYN for medical issues. \par \par  [Treatment Education] : treatment education [Treatment Adherence] : treatment adherence [Rx Dose / Side Effects] : Rx dose/side effects

## 2022-05-13 NOTE — HISTORY OF PRESENT ILLNESS
[FreeTextEntry1] : 50 y/o female called for f/u visit. \par \par She is on long term keflex for chronic UTI. No fever.\par \par No issues with her urine. No fever. \par \par Got the COVID vaccine. \par \par On and off yeast infection being managed by GYN.

## 2022-05-13 NOTE — HISTORY OF PRESENT ILLNESS
[No] : patient does not have a  history of radiation therapy [de-identified] : 50 y/o female with hx of severe cerumen impaction presents for routine cleaning. Otherwise feeling well. \par neurologic disorder requiring wheelchair confinement\par no throat complaints\par no modifying factors\par  [Anxiety] : no anxiety [Dizziness] : no dizziness [Headache] : no headache [Hearing Loss] : no hearing loss [Otalgia] : otalgia [Recurrent Otitis Media] : no recurrent otitis media [Otitis Media with Effusion] : no otitis media with effusion [Presbycusis] : no presbycusis [Congenital Ear Malformation] : no congenital ear malformation [Meniere Disease] : no Meniere disease [Otosclerosis] : no otosclerosis [Perilymphatic Fistula] : no perilymphatic fistula [Hypertension] : no hypertension [Loud Noise Exposure] : no history of loud noise exposure [Smoking] : no smoking [Stroke] : no stroke [Early Onset Hearing Loss] : no early onset hearing loss [Facial Pain] : no facial pain [Facial Pressure] : no facial pressure [Nasal Congestion] : nasal congestion [Diplopia] : no diplopia [Ear Fullness] : no ear fullness [Allergic Rhinitis] : no allergic rhinitis [Maxillary Tooth Infection] : no maxillary tooth infection [Septal Deviation] : no septal deviation [Environmental Allergies] : no environmental allergies [Seasonal Allergies] : no seasonal allergies [Environmental Allergens] : no environmental allergens [Adenoidectomy] : no adenoidectomy [Nasal FB Removal] : no nasal foreign body removal [Allergies] : no allergies [Asthma] : no asthma [Neck Mass] : no neck mass [Neck Pain] : no neck pain [Chills] : no chills [Cold Intolerance] : no cold intolerance [Cough] : no cough [Fatigue] : no fatigue [Heat Intolerance] : no heat intolerance [Hyperthyroidism] : no hyperthyroidism [Sialadenitis] : no sialadenitis [Hodgkin Disease] : no hodgkin disease [Non-Hodgkin Lymphoma] : no non-hodgkin lymphoma [None] : No risk factors have been identified. [Tobacco Use] : no tobacco use [Alcohol Use] : no alcohol use [Graves Disease] : no graves disease [Thyroid Cancer] : no thyroid cancer

## 2022-05-13 NOTE — ASSESSMENT
[FreeTextEntry1] : Patient here for routine ear cleaning, otherwise doing well \par \par Wax:\par -Cerumen is removed from the right and left  ear canal with a curette and suction.\par -Rx:Debrox be placed in both ears on a routine basis to keep them free of wax.\par -Routine debridement suggested to keep the ears free of wax.\par \par \par f/u 6 months or prn

## 2022-05-13 NOTE — PHYSICAL EXAM
[de-identified] : bilateral cerumen [Nasal Endoscopy Performed] : nasal endoscopy was performed, see procedure section for findings [] : septum deviated bilaterally [de-identified] : congested [Midline] : trachea located in midline position [Normal] : no rashes

## 2022-05-14 NOTE — END OF VISIT
[FreeTextEntry3] : Patient seen and examine with our NP. I agree with above findings and plan.  [Time Spent: ___ minutes] : I have spent [unfilled] minutes of time on the encounter.

## 2022-05-14 NOTE — REVIEW OF SYSTEMS
[Fatigue] : fatigue [Recent Weight Gain (___ Lbs)] : recent weight gain: [unfilled] lbs [Irregular Menses] : irregular menses [Joint Pain] : joint pain [Dry Skin] : dry skin [Hair Loss] : hair loss [Headaches] : headaches [Swelling] : swelling [Negative] : Respiratory [Constipation] : no constipation [Polydipsia] : no polydipsia [FreeTextEntry7] : diarrhea on and off

## 2022-05-14 NOTE — PHYSICAL EXAM
[Alert] : alert [No Acute Distress] : no acute distress [Normal Sclera/Conjunctiva] : normal sclera/conjunctiva [EOMI] : extra ocular movement intact [No LAD] : no lymphadenopathy [Thyroid Not Enlarged] : the thyroid was not enlarged [No Thyroid Nodules] : no palpable thyroid nodules [No Respiratory Distress] : no respiratory distress [Clear to Auscultation] : lungs were clear to auscultation bilaterally [Normal S1, S2] : normal S1 and S2 [Regular Rhythm] : with a regular rhythm [Normal Bowel Sounds] : normal bowel sounds [Not Tender] : non-tender [Not Distended] : not distended [Soft] : abdomen soft [Normal Anterior Cervical Nodes] : no anterior cervical lymphadenopathy [No Clubbing, Cyanosis] : no clubbing  or cyanosis of the fingernails [No Rash] : no rash [No Tremors] : no tremors [Normal Affect] : the affect was normal [Normal Mood] : the mood was normal [Normal Reflexes] : deep tendon reflexes were 2+ and symmetric [Acanthosis Nigricans] : no acanthosis nigricans [de-identified] : b/l edema [de-identified] : contracted UE B/L, using wheelchair

## 2022-05-14 NOTE — HISTORY OF PRESENT ILLNESS
[FreeTextEntry1] : 51 y.o. female with h/o CP, hyperprolactinemia and subclinical hypothyroidism here for follow up visit. She did get both doses of COVID-19 vaccine (Pfizer) and booster, was exposed to COVID from her home health aides 3 weeks ago but tested negative. She had viral syndrome in May 2021 and went to the hospital. Reports irregular menses and follows with GYN. Reports heavy and long menstrual cycle. Does have fibroids. Notes periods irregular since COVID booster in Jan 2022, shorter and getting spotting instead. C/o dry skin. Had avulsion fracture in the past. Last brain MRI in June 2014 showed no obvious pituitary lesion but was noncontrast. Does get headaches but not severe. No vision changes. No galactorrhea. C/o hair loss which is worse. Has been taking iron 27 mg daily (less than before). Dealing with chronic UTIs and BV/yeast infection. Follows with ID. Does have daily urine catheterization. Saw cardiology for palpitations, reports now improved. H/o IBS.\par \par In regards to thyroid disease (subclinical hypothyroidism), did not tolerate treatment with T4 in the past. C/o fatigue. No neck complaints. Reports weight gain- reports she is 153 lbs now and feels heavier. Eating less bagels but too much cheese.  No PT now. \par \par Regarding bone health, wheel chair bound therefore unable to perform weight bearing activity. Had DEXA scan in November 2017 showing spine -0.9 (arthritic changes) , left femoral neck -0.5, total hip -0.6 and 1/3 radius 0.2. Takes vitamin D3 5,000 IU daily. Eats leafy greens and cheese. No falls. \par \par Reports increased bilateral LE edema and back pain because her hospital grade bed broke recently, trying to get a new bed now.

## 2022-05-14 NOTE — ASSESSMENT
[FreeTextEntry1] : 51 y.o. female with h/o hyperprolactinemia, subclinical hypothyroidism, and vitamin D def.\par \par 1. Hyperprolactinemia- Suspect pituitary microadenoma. Since getting menses and no galactorrhea, have been monitoring given medication sensitivity. If prolactin level is stable, will continue to monitor. Now with lighter/spotty menses, may be related to COVID-19 vaccine, possible perimenopause, or hyperprolactinemia. Will check serum prolactin and sex hormones. \par \par 2. Subclinical hypothyroidism- Will check TFTs and if stable, will continue to monitor.\par \par 3. Vitamin D def- Will check 25 vitamin D level and will continue supplement. Regarding bone health, DEXA scan shows normal BMD (11/2017). Will repeat at next visit.\par \par 4. Anemia- Will check CBC, iron studies and vitamin B12 with folate.\par \par 5. Hyperlipidemia- Encouraged a low fat diet. Handout provided with diet recommendations. Will check lipids and Hba1c. \par \par If stable, follow up in 6 months\par Advised to f/u with GYN.\par \par Seen and d/w Dr. Harris.\par Guillermina Real NP (Brenda)  Endocrine Fellow

## 2022-05-16 LAB
25(OH)D3 SERPL-MCNC: 50.1 NG/ML
ALBUMIN SERPL ELPH-MCNC: 4.5 G/DL
ALP BLD-CCNC: 75 U/L
ALT SERPL-CCNC: 11 U/L
ANION GAP SERPL CALC-SCNC: 12 MMOL/L
AST SERPL-CCNC: 14 U/L
BASOPHILS # BLD AUTO: 0.05 K/UL
BASOPHILS NFR BLD AUTO: 0.9 %
BILIRUB SERPL-MCNC: 0.2 MG/DL
BUN SERPL-MCNC: 14 MG/DL
CALCIUM SERPL-MCNC: 10 MG/DL
CHLORIDE SERPL-SCNC: 101 MMOL/L
CHOLEST SERPL-MCNC: 249 MG/DL
CO2 SERPL-SCNC: 23 MMOL/L
CREAT SERPL-MCNC: 0.36 MG/DL
EGFR: 123 ML/MIN/1.73M2
EOSINOPHIL # BLD AUTO: 0.21 K/UL
EOSINOPHIL NFR BLD AUTO: 3.9 %
ESTIMATED AVERAGE GLUCOSE: 100 MG/DL
ESTRADIOL SERPL-MCNC: 17 PG/ML
FERRITIN SERPL-MCNC: 31 NG/ML
FOLATE SERPL-MCNC: 15.6 NG/ML
FSH SERPL-MCNC: 63.8 IU/L
GH SERPL-MCNC: 0.27 NG/ML
GLUCOSE SERPL-MCNC: 90 MG/DL
HBA1C MFR BLD HPLC: 5.1 %
HCT VFR BLD CALC: 39.2 %
HDLC SERPL-MCNC: 92 MG/DL
HGB BLD-MCNC: 12.6 G/DL
IGF-1 INTERP: NORMAL
IGF-I BLD-MCNC: 67 NG/ML
IMM GRANULOCYTES NFR BLD AUTO: 0.2 %
IRON SATN MFR SERPL: 39 %
IRON SERPL-MCNC: 146 UG/DL
LDLC SERPL CALC-MCNC: 147 MG/DL
LH SERPL-ACNC: 37.1 IU/L
LYMPHOCYTES # BLD AUTO: 1.96 K/UL
LYMPHOCYTES NFR BLD AUTO: 36.4 %
MAN DIFF?: NORMAL
MCHC RBC-ENTMCNC: 27.9 PG
MCHC RBC-ENTMCNC: 32.1 GM/DL
MCV RBC AUTO: 86.7 FL
MONOCYTES # BLD AUTO: 0.41 K/UL
MONOCYTES NFR BLD AUTO: 7.6 %
NEUTROPHILS # BLD AUTO: 2.75 K/UL
NEUTROPHILS NFR BLD AUTO: 51 %
NONHDLC SERPL-MCNC: 157 MG/DL
PLATELET # BLD AUTO: 282 K/UL
POTASSIUM SERPL-SCNC: 4.3 MMOL/L
PROT SERPL-MCNC: 7.4 G/DL
RBC # BLD: 4.52 M/UL
RBC # FLD: 14.4 %
SODIUM SERPL-SCNC: 137 MMOL/L
T4 FREE SERPL-MCNC: 1.1 NG/DL
TIBC SERPL-MCNC: 377 UG/DL
TRIGL SERPL-MCNC: 47 MG/DL
TSH SERPL-ACNC: 3.97 UIU/ML
UIBC SERPL-MCNC: 231 UG/DL
VIT B12 SERPL-MCNC: >2000 PG/ML
WBC # FLD AUTO: 5.39 K/UL

## 2022-06-10 ENCOUNTER — APPOINTMENT (OUTPATIENT)
Dept: INTERNAL MEDICINE | Facility: CLINIC | Age: 52
End: 2022-06-10
Payer: MEDICARE

## 2022-06-10 ENCOUNTER — OUTPATIENT (OUTPATIENT)
Dept: OUTPATIENT SERVICES | Facility: HOSPITAL | Age: 52
LOS: 1 days | End: 2022-06-10
Payer: MEDICARE

## 2022-06-10 VITALS
HEART RATE: 90 BPM | OXYGEN SATURATION: 94 % | HEIGHT: 59 IN | DIASTOLIC BLOOD PRESSURE: 80 MMHG | WEIGHT: 122 LBS | BODY MASS INDEX: 24.6 KG/M2 | SYSTOLIC BLOOD PRESSURE: 118 MMHG

## 2022-06-10 DIAGNOSIS — Z98.890 OTHER SPECIFIED POSTPROCEDURAL STATES: Chronic | ICD-10-CM

## 2022-06-10 DIAGNOSIS — R21 RASH AND OTHER NONSPECIFIC SKIN ERUPTION: ICD-10-CM

## 2022-06-10 DIAGNOSIS — J06.9 ACUTE UPPER RESPIRATORY INFECTION, UNSPECIFIED: ICD-10-CM

## 2022-06-10 DIAGNOSIS — N39.0 URINARY TRACT INFECTION, SITE NOT SPECIFIED: ICD-10-CM

## 2022-06-10 DIAGNOSIS — R10.819 ABDOMINAL TENDERNESS, UNSPECIFIED SITE: ICD-10-CM

## 2022-06-10 DIAGNOSIS — I10 ESSENTIAL (PRIMARY) HYPERTENSION: ICD-10-CM

## 2022-06-10 PROCEDURE — G0463: CPT

## 2022-06-10 PROCEDURE — 99214 OFFICE O/P EST MOD 30 MIN: CPT | Mod: GC

## 2022-06-13 PROBLEM — N39.0 CHRONIC UTI: Noted: 2021-09-24

## 2022-06-13 PROBLEM — R10.819 SUPRAPUBIC TENDERNESS: Status: RESOLVED | Noted: 2018-07-24 | Resolved: 2022-06-13

## 2022-06-13 PROBLEM — J06.9 VIRAL URI WITH COUGH: Status: RESOLVED | Noted: 2021-06-03 | Resolved: 2022-06-13

## 2022-06-13 PROBLEM — R21 SKIN RASH: Status: ACTIVE | Noted: 2021-02-01

## 2022-06-13 NOTE — REVIEW OF SYSTEMS
[Fever] : no fever [Chills] : no chills [Pain] : no pain [Vision Problems] : no vision problems [Hearing Loss] : no hearing loss [Sore Throat] : no sore throat [Chest Pain] : no chest pain [Shortness Of Breath] : no shortness of breath [Wheezing] : no wheezing [Cough] : no cough [Dyspnea on Exertion] : no dyspnea on exertion [Nausea] : no nausea [Constipation] : no constipation [Vomiting] : no vomiting [Melena] : no melena [Dysuria] : no dysuria [Hematuria] : no hematuria [Headache] : no headache [Dizziness] : no dizziness [FreeTextEntry5] : chronic LE edema b/l [FreeTextEntry7] : baseline intermittent loose stools/abdominal discomfort [FreeTextEntry8] : baseline urinary incontinence/retention; irregular menstrual periods (lighter and more infrequent recently) [FreeTextEntry9] : baseline joint pains and muscle weakness (worse in setting of nonfunctioning hospital bed and decreased activity) [de-identified] : +itchy, stinging rash on b/l posterior thighs still present

## 2022-06-13 NOTE — ASSESSMENT
[FreeTextEntry1] : 51F w/ hx of cervical myelopathy/cerebral palsy (wheelchair bound), uterine fibroids with menorrhagia, anemia, ?IBS, chronic urinary incontinence (daily straight caths) with recurrent UTIs (on daily keflex), vaginitis, and BV, hyperprolactinemia, and subclinical hypothyroidism presenting for CPE.\par \par #Irregular Menses / Menorrhagia / Uterine fibroids\par - previous baseline menorrhagia likely 2/2 uterine fibroids\par - now with increased irregularity and recent lighter menstruation, likely 2/2 ismael-menopause vs less likely hyperprolactinemia (relatively stable levels / decreased recently)\par - per last GYN note in Nov 2021, was recommended for MRI Pelvis w/ contrast and endometrial biopsy; last visit had advised that there should be no contradiction to receiving contrast for MRI, but pt has yet to pursue further evaluation\par - pt had elected for conservative observation and to hold off on elective hysterectomy for now\par - continue to monitor menses and for ismael-menopause symptoms (e.g. hot flashes, worsening bladder/vaginal issues, dry skin, etc.)\par - f/u with GYN at next annual check up\par \par #Cervical myelopathy / cerebral palsy / functional quadriplegia\par - overall joint/muscle pains likely worse recently 2/2 use of non-functional hospital bed over the past few months\par - c/w wheelchair-use and has HHA 12 hrs/day x7 days/wk\par - c/w Tylenol PRN, massage therapy, and heat packs for chronic joint pains\par - c/w PT and home exercises as tolerated\par - awaiting delivery of new hospital bed (estimated to arrive in end of June or early July)\par - follows with Neurology\par \par #Chronic urinary incontinence/retention c/b recurrent UTIs, stable\par - 2/2 neurogenic bladder\par - c/w straight caths daily\par - c/w prophylactic Keflex daily given hx of recurrent UTIs\par - follows with Urology and ID\par \par #Recurrent vaginitis/BV/Vaginal candidiasis\par - c/w Nystatin ointment/cream PRN\par - c/w Terconazole vaginal suppositories PRN\par - c/w metronidazole vaginal gel PRN\par - follows with ID and GYN\par \par #Hyperprolactinemia and subclinical hypothyroidism\par - suspect 2/2 pituitary microadenoma\par - Prolactin level 88.2 in 5/2022 (decreased from 11/2021)\par - TSH 3.97 and FT4 1.1 in 5/2022 (stable compared to prior)\par - follows with Endocrinology\par \par #Posterior thigh rash, persistent\par - Per Derm evaluation in Aug 2021, likely 2/2 candidiasis and irritant contact dermatitis\par - avoid lysol/clorox use in toilet for cleaning\par - c/w terbinafine 1% 2-3 times daily until resolves (as previously recommended by Derm)\par - pt will f/u with an ?outside Derm in about 1 week\par \par #HCM\par - Immunizations: s/p flu vaccine last on 11/5/21; s/p COVID-19 vaccine x3 doses (last in Jan 2022); pt deferring zoster vaccine for now\par - Colon cancer screening: FIT testing on 5/16/21 was negative; repeat ordered and FIT test kit provided\par - Labs: recent labs from May 2022 (ordered by Endo) reviewed\par - Forms: [facility] form filled out, attending signed, faxed, and placed in "to be scanned" bin\par \par RTC in Nov 2022 for annual CPE, or sooner PRN\par d/w Dr. Marcial\par

## 2022-06-13 NOTE — PHYSICAL EXAM
[Normal Sclera/Conjunctiva] : normal sclera/conjunctiva [Normal] : normal rate, regular rhythm, normal S1 and S2 and no murmur heard [Soft] : abdomen soft [Non Tender] : non-tender [Non-distended] : non-distended [No Joint Swelling] : no joint swelling [Normal Bowel Sounds] : normal bowel sounds [de-identified] : non-pitting edema in b/l LEs (chronic) [de-identified] : chronic mildly erythematous papular rash of b/l posterior thighs [de-identified] : baseline b/l UE and LE weakness; wheelchair bound
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
## 2022-06-20 DIAGNOSIS — R53.2 FUNCTIONAL QUADRIPLEGIA: ICD-10-CM

## 2022-06-20 DIAGNOSIS — G80.9 CEREBRAL PALSY, UNSPECIFIED: ICD-10-CM

## 2022-06-20 DIAGNOSIS — E22.1 HYPERPROLACTINEMIA: ICD-10-CM

## 2022-08-05 ENCOUNTER — NON-APPOINTMENT (OUTPATIENT)
Age: 52
End: 2022-08-05

## 2022-08-31 ENCOUNTER — APPOINTMENT (OUTPATIENT)
Dept: INFECTIOUS DISEASE | Facility: CLINIC | Age: 52
End: 2022-08-31

## 2022-09-01 ENCOUNTER — APPOINTMENT (OUTPATIENT)
Dept: INFECTIOUS DISEASE | Facility: CLINIC | Age: 52
End: 2022-09-01

## 2022-09-01 VITALS
TEMPERATURE: 97.4 F | OXYGEN SATURATION: 96 % | WEIGHT: 122 LBS | HEIGHT: 59 IN | SYSTOLIC BLOOD PRESSURE: 110 MMHG | DIASTOLIC BLOOD PRESSURE: 70 MMHG | HEART RATE: 85 BPM | BODY MASS INDEX: 24.6 KG/M2

## 2022-09-01 PROCEDURE — 99212 OFFICE O/P EST SF 10 MIN: CPT

## 2022-09-01 NOTE — ASSESSMENT
[FreeTextEntry1] : 52-year-old female comes for followup visit for UTI and chronic abx prophylaxis. Her  symptoms are stable overall. \par \par Continue Keflex for chronic prophylaxis. Pt seems to be overall stable since starting daily prophylaxis\par \par No major urinary issues.  \par \par Got her COVID vaccine already. \par \par F/u with medical team and GYN for medical issues. \par \par Pt wants to check her COVID antibodies - will check. \par \par  [Treatment Education] : treatment education [Treatment Adherence] : treatment adherence [Rx Dose / Side Effects] : Rx dose/side effects

## 2022-09-01 NOTE — HISTORY OF PRESENT ILLNESS
[FreeTextEntry1] : 51 y/o female called for f/u visit. \par \par She is on long term keflex for chronic UTI. No fever.\par \par No issues with her urine. No fever. \par \par Got the COVID vaccine. \par \par On and off yeast infection being managed by GYN.

## 2022-09-07 LAB
COVID-19 NUCLEOCAPSID  GAM ANTIBODY INTERPRETATION: NEGATIVE
COVID-19 SPIKE DOMAIN ANTIBODY INTERPRETATION: POSITIVE
SARS-COV-2 AB SERPL IA-ACNC: >250 U/ML
SARS-COV-2 AB SERPL QL IA: 0.08 INDEX

## 2022-09-29 ENCOUNTER — APPOINTMENT (OUTPATIENT)
Dept: UROLOGY | Facility: CLINIC | Age: 52
End: 2022-09-29

## 2022-10-07 ENCOUNTER — APPOINTMENT (OUTPATIENT)
Dept: PHYSICAL MEDICINE AND REHAB | Facility: CLINIC | Age: 52
End: 2022-10-07

## 2022-10-07 VITALS — SYSTOLIC BLOOD PRESSURE: 104 MMHG | DIASTOLIC BLOOD PRESSURE: 88 MMHG | OXYGEN SATURATION: 98 % | HEART RATE: 102 BPM

## 2022-10-07 PROCEDURE — 99214 OFFICE O/P EST MOD 30 MIN: CPT
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
## 2022-10-07 NOTE — ASSESSMENT
[FreeTextEntry1] : - Continue OT\par - Patient with new neurologic symptoms that have gotten worse- needs a MRI of spine to r/o cord compression.\par - If not revealing can do w/u for R shoulder blade and possible trigger point injection w musculoskeletal specialists.\par - will call w MRI results.\par - Can consider botox in future for fingers\par - Continue to f/u gyn and PMD for basic medical needs.

## 2022-10-07 NOTE — PHYSICAL EXAM
[Normal] : Oriented to person, place, and time, insight and judgement were intact and the affect was normal [de-identified] : no distress [de-identified] : well perfused, trace edema [de-identified] : speech clear/fluent and intelligible;Motor-  bl UE 4/5 EF/EE/WE; Motor 1/5 bl; FF; bl LEs 0/5.; bl fingers w MAS 3 spasticity at MCP

## 2022-10-18 PROBLEM — Z86.018 HISTORY OF UTERINE FIBROID: Status: ACTIVE | Noted: 2022-10-18

## 2022-11-08 ENCOUNTER — LABORATORY RESULT (OUTPATIENT)
Age: 52
End: 2022-11-08

## 2022-11-10 ENCOUNTER — APPOINTMENT (OUTPATIENT)
Dept: MAMMOGRAPHY | Facility: IMAGING CENTER | Age: 52
End: 2022-11-10

## 2022-11-10 ENCOUNTER — APPOINTMENT (OUTPATIENT)
Dept: ULTRASOUND IMAGING | Facility: IMAGING CENTER | Age: 52
End: 2022-11-10

## 2022-11-10 ENCOUNTER — RESULT REVIEW (OUTPATIENT)
Age: 52
End: 2022-11-10

## 2022-11-10 ENCOUNTER — OUTPATIENT (OUTPATIENT)
Dept: OUTPATIENT SERVICES | Facility: HOSPITAL | Age: 52
LOS: 1 days | End: 2022-11-10
Payer: MEDICARE

## 2022-11-10 DIAGNOSIS — Z98.890 OTHER SPECIFIED POSTPROCEDURAL STATES: Chronic | ICD-10-CM

## 2022-11-10 DIAGNOSIS — Z00.8 ENCOUNTER FOR OTHER GENERAL EXAMINATION: ICD-10-CM

## 2022-11-10 PROCEDURE — 77067 SCR MAMMO BI INCL CAD: CPT | Mod: 26

## 2022-11-10 PROCEDURE — 77067 SCR MAMMO BI INCL CAD: CPT

## 2022-11-10 PROCEDURE — 76641 ULTRASOUND BREAST COMPLETE: CPT

## 2022-11-10 PROCEDURE — 76641 ULTRASOUND BREAST COMPLETE: CPT | Mod: 26,50

## 2022-11-10 PROCEDURE — 77063 BREAST TOMOSYNTHESIS BI: CPT | Mod: 26

## 2022-11-10 PROCEDURE — 77063 BREAST TOMOSYNTHESIS BI: CPT

## 2022-11-11 ENCOUNTER — APPOINTMENT (OUTPATIENT)
Dept: ENDOCRINOLOGY | Facility: CLINIC | Age: 52
End: 2022-11-11

## 2022-11-11 VITALS
HEIGHT: 59 IN | BODY MASS INDEX: 25.2 KG/M2 | SYSTOLIC BLOOD PRESSURE: 114 MMHG | HEART RATE: 98 BPM | OXYGEN SATURATION: 99 % | WEIGHT: 125 LBS | TEMPERATURE: 98 F | DIASTOLIC BLOOD PRESSURE: 64 MMHG

## 2022-11-11 DIAGNOSIS — Z23 ENCOUNTER FOR IMMUNIZATION: ICD-10-CM

## 2022-11-11 DIAGNOSIS — N92.6 IRREGULAR MENSTRUATION, UNSPECIFIED: ICD-10-CM

## 2022-11-11 PROCEDURE — 90686 IIV4 VACC NO PRSV 0.5 ML IM: CPT

## 2022-11-11 PROCEDURE — 99215 OFFICE O/P EST HI 40 MIN: CPT | Mod: 25

## 2022-11-11 PROCEDURE — 77080 DXA BONE DENSITY AXIAL: CPT

## 2022-11-11 PROCEDURE — G0008: CPT

## 2022-11-11 PROCEDURE — ZZZZZ: CPT

## 2022-11-13 NOTE — REVIEW OF SYSTEMS
[Fatigue] : fatigue [Recent Weight Gain (___ Lbs)] : recent weight gain: [unfilled] lbs [Irregular Menses] : irregular menses [Joint Pain] : joint pain [Dry Skin] : dry skin [Hair Loss] : hair loss [Headaches] : headaches [Hot Flashes] : hot flashes [Swelling] : swelling [Negative] : Respiratory [Constipation] : no constipation [Polydipsia] : no polydipsia

## 2022-11-13 NOTE — PHYSICAL EXAM
[Alert] : alert [No Acute Distress] : no acute distress [Normal Sclera/Conjunctiva] : normal sclera/conjunctiva [EOMI] : extra ocular movement intact [No LAD] : no lymphadenopathy [Thyroid Not Enlarged] : the thyroid was not enlarged [No Thyroid Nodules] : no palpable thyroid nodules [No Respiratory Distress] : no respiratory distress [Clear to Auscultation] : lungs were clear to auscultation bilaterally [Normal S1, S2] : normal S1 and S2 [Regular Rhythm] : with a regular rhythm [Normal Bowel Sounds] : normal bowel sounds [Not Tender] : non-tender [Not Distended] : not distended [Soft] : abdomen soft [Normal Anterior Cervical Nodes] : no anterior cervical lymphadenopathy [No Clubbing, Cyanosis] : no clubbing  or cyanosis of the fingernails [No Rash] : no rash [Normal Reflexes] : deep tendon reflexes were 2+ and symmetric [No Tremors] : no tremors [Normal Affect] : the affect was normal [Normal Mood] : the mood was normal [Acanthosis Nigricans] : no acanthosis nigricans [de-identified] : b/l edema [de-identified] : contracted UE B/L, using wheelchair

## 2022-11-13 NOTE — HISTORY OF PRESENT ILLNESS
[FreeTextEntry1] : 52 y.o. female with h/o CP, hyperprolactinemia and subclinical hypothyroidism here for follow up visit. She did get both doses of COVID-19 vaccine (Pfizer) and 1 booster. She had viral syndrome in May 2021 and went to the hospital. Reports irregular menses and follows with GYN. Reports heavy menstrual cycle. Does have fibroids. Notes periods irregular since COVID booster in Jan 2022, shorter and getting spotting instead. C/o dry skin. Had avulsion fracture in the past. Last brain MRI in June 2014 showed no obvious pituitary lesion but was noncontrast. Does get frontal headaches but not severe. No vision changes. No galactorrhea. C/o hair loss which is worse. Has been taking iron 27 mg daily (less than before). Dealing with chronic UTIs and BV/yeast infection. Follows with ID. Does have daily urine catheterization. Saw cardiology for palpitations, reports now improved. H/o IBS.\par \par In regards to thyroid disease (subclinical hypothyroidism), did not tolerate treatment with T4 in the past. C/o fatigue. No neck complaints. Reports weight gain is stable. Eating less bagels but too much cheese.  No PT now. \par \par Regarding bone health, wheel chair bound therefore unable to perform weight bearing activity. Had DEXA scan in November 2017 showing spine -0.9 (arthritic changes) , left femoral neck -0.5, total hip -0.6 and 1/3 radius 0.2. Takes vitamin D3 5,000 IU daily. Eats leafy greens and cheese. No falls. \par

## 2022-11-13 NOTE — ASSESSMENT
[FreeTextEntry1] : 52 y.o. female with h/o hyperprolactinemia, subclinical hypothyroidism, vitamin D def, hyperlipidemia and osteopenia\par \par \par 1. Hyperprolactinemia- Suspect pituitary microadenoma. Since getting menses and no galactorrhea, have been monitoring given medication sensitivity. If prolactin level is stable, will continue to monitor. Now with lighter/spotty menses, may be related to COVID-19 vaccine, possible perimenopause, or hyperprolactinemia. Will check serum prolactin and sex hormones. \par \par 2. Subclinical hypothyroidism- Will check TFTs and if stable, will continue to monitor.\par \par 3. Vitamin D def- Will check 25 vitamin D level and will continue supplement. \par \par 4. Anemia- Will check CBC and iron studies.\par \par 5. Hyperlipidemia- Encouraged a low fat diet. Will check lipids and Hba1c. \par \par 6. Osteopenia- DEXA scan performed today shows spine -1.1 with 2.1% decrease, right femoral neck -0.6 and total hip -0.6 which is stable and 1/3 radius is 0.5. Risk of fracture is average. Recommend monitoring for now and will repeat DEXA scan in 2024. Encouraged weight bearing activity as tolerated. Discussed appropriate calcium and vitamin D intake. Will check 25 vitamin D level. \par \par If stable, follow up in 6 months\par Advised to follow up with GYN and PCP if joint pains persist

## 2022-11-15 LAB
25(OH)D3 SERPL-MCNC: 56.7 NG/ML
ALBUMIN SERPL ELPH-MCNC: 4.5 G/DL
ALP BLD-CCNC: 72 U/L
ALT SERPL-CCNC: 15 U/L
ANION GAP SERPL CALC-SCNC: 13 MMOL/L
AST SERPL-CCNC: 16 U/L
BASOPHILS # BLD AUTO: 0.05 K/UL
BASOPHILS NFR BLD AUTO: 0.9 %
BILIRUB SERPL-MCNC: 0.2 MG/DL
BUN SERPL-MCNC: 12 MG/DL
CALCIUM SERPL-MCNC: 9.7 MG/DL
CHLORIDE SERPL-SCNC: 100 MMOL/L
CHOLEST SERPL-MCNC: 224 MG/DL
CO2 SERPL-SCNC: 23 MMOL/L
CREAT SERPL-MCNC: 0.34 MG/DL
EGFR: 124 ML/MIN/1.73M2
EOSINOPHIL # BLD AUTO: 0.15 K/UL
EOSINOPHIL NFR BLD AUTO: 2.7 %
ESTRADIOL SERPL-MCNC: 32 PG/ML
FERRITIN SERPL-MCNC: 13 NG/ML
FSH SERPL-MCNC: 20.9 IU/L
GLUCOSE SERPL-MCNC: 89 MG/DL
HCT VFR BLD CALC: 35.8 %
HDLC SERPL-MCNC: 87 MG/DL
HGB BLD-MCNC: 12.2 G/DL
IMM GRANULOCYTES NFR BLD AUTO: 0.2 %
IRON SATN MFR SERPL: 12 %
IRON SERPL-MCNC: 42 UG/DL
LDLC SERPL CALC-MCNC: 129 MG/DL
LH SERPL-ACNC: 15.5 IU/L
LYMPHOCYTES # BLD AUTO: 1.92 K/UL
LYMPHOCYTES NFR BLD AUTO: 34.9 %
MAN DIFF?: NORMAL
MCHC RBC-ENTMCNC: 29.8 PG
MCHC RBC-ENTMCNC: 34.1 GM/DL
MCV RBC AUTO: 87.5 FL
MONOCYTES # BLD AUTO: 0.38 K/UL
MONOCYTES NFR BLD AUTO: 6.9 %
NEUTROPHILS # BLD AUTO: 2.99 K/UL
NEUTROPHILS NFR BLD AUTO: 54.4 %
NONHDLC SERPL-MCNC: 137 MG/DL
PLATELET # BLD AUTO: 242 K/UL
POTASSIUM SERPL-SCNC: 4.2 MMOL/L
PROLACTIN SERPL-MCNC: 102 NG/ML
PROT SERPL-MCNC: 7 G/DL
RBC # BLD: 4.09 M/UL
RBC # FLD: 12.2 %
RHEUMATOID FACT SER QL: <10 IU/ML
SODIUM SERPL-SCNC: 136 MMOL/L
T4 FREE SERPL-MCNC: 1 NG/DL
TIBC SERPL-MCNC: 355 UG/DL
TRIGL SERPL-MCNC: 39 MG/DL
TSH SERPL-ACNC: 3.63 UIU/ML
UIBC SERPL-MCNC: 314 UG/DL
WBC # FLD AUTO: 5.5 K/UL

## 2022-11-16 ENCOUNTER — APPOINTMENT (OUTPATIENT)
Dept: ULTRASOUND IMAGING | Facility: IMAGING CENTER | Age: 52
End: 2022-11-16

## 2022-11-16 ENCOUNTER — APPOINTMENT (OUTPATIENT)
Dept: SURGICAL ONCOLOGY | Facility: CLINIC | Age: 52
End: 2022-11-16

## 2022-11-16 ENCOUNTER — OUTPATIENT (OUTPATIENT)
Dept: OUTPATIENT SERVICES | Facility: HOSPITAL | Age: 52
LOS: 1 days | End: 2022-11-16
Payer: MEDICARE

## 2022-11-16 VITALS
OXYGEN SATURATION: 98 % | BODY MASS INDEX: 25.2 KG/M2 | RESPIRATION RATE: 15 BRPM | HEIGHT: 59 IN | SYSTOLIC BLOOD PRESSURE: 112 MMHG | DIASTOLIC BLOOD PRESSURE: 75 MMHG | HEART RATE: 83 BPM | WEIGHT: 125 LBS

## 2022-11-16 DIAGNOSIS — Z98.890 OTHER SPECIFIED POSTPROCEDURAL STATES: Chronic | ICD-10-CM

## 2022-11-16 DIAGNOSIS — N31.9 NEUROMUSCULAR DYSFUNCTION OF BLADDER, UNSPECIFIED: ICD-10-CM

## 2022-11-16 DIAGNOSIS — Z86.018 PERSONAL HISTORY OF OTHER BENIGN NEOPLASM: ICD-10-CM

## 2022-11-16 PROCEDURE — 76775 US EXAM ABDO BACK WALL LIM: CPT | Mod: 26

## 2022-11-16 PROCEDURE — 76856 US EXAM PELVIC COMPLETE: CPT

## 2022-11-16 PROCEDURE — 99214 OFFICE O/P EST MOD 30 MIN: CPT

## 2022-11-16 PROCEDURE — 76856 US EXAM PELVIC COMPLETE: CPT | Mod: 26

## 2022-11-16 PROCEDURE — 76775 US EXAM ABDO BACK WALL LIM: CPT

## 2022-11-16 NOTE — ASSESSMENT
[FreeTextEntry1] : Multiple bilateral breast nodules\par BI-RADS 3 imaging November 2022\par Life time risk calculated at 14%\par Agree with 6 month follow up bilateral sonogram May 2023 \par RTO 6 months\par

## 2022-11-16 NOTE — ADDENDUM
[FreeTextEntry1] : I, Sasha De Los Santos, acted solely as a scribe for Dr. Nav Garza on this date 11/16/2022.\par

## 2022-11-16 NOTE — HISTORY OF PRESENT ILLNESS
[de-identified] : 53 y/o female presents for a follow-up visit. \par History multiple bilateral benign US guided needle biopsies (fibrocystic changes and fibroadenomas) \par \par Her recent bilateral mammo/sono performed on 11/10/22 showed extremely dense breasts. No radiographic evidence of malignancy and no significant radiographic change. Newly seen probably benign hypoechoic nodules by sonography at the 10:00 (6 cm from the nipple) right breast and 12:00 (1 cm from the nipple) and 3:00 (5 cm from the nipple) left breast for which follow-up bilateral targeted sonography in 6 months is recommended, to confirm their stability. (BI-RADS 3)\par \par Bilateral mammo/sono performed on 10/29/21 showed no mammographic or sonographic evidence of malignancy. (BI-RADS 2)\par \par MMG/US 8/14/20: BI-RADS 2\par Extremely dense breasts. No mammographic evidence of malignancy. No significant change. No sonographic evidence of malignancy.\par \par US 2/5/20: BI-RADS 3\par At 7:00, 8 cm from the nipple, there is a 0.5 x 0.2 x 0.6 cm circumscribed hypoechoic mass, stable since 05/14/2019. \par At 3:00, 3 cm from the nipple, there is a 0.8 x 0.2 x 3.5 cm probable cyst versus hypoechoic mass, stable since 05/14/2019. \par At 3:00, 4 cm from the nipple, there is a 0.4 x 0.2 x 0.3 cm circumscribed hypoechoic mass, stable since 05/14/2019.\par Stable bilateral circumscribed hypoechoic masses, probably benign, for which six-month ultrasound follow-up in May 2020 at time of annual mammogram is recommended.\par \par Bilateral MMG/US 5/14/19: Multiple bilateral cysts and nodules bilaterally. Right breast 12:00 4 cm fn: newly noted 0.5 cm circumscribed hypoechoic mass. Newly noted circumscribed hypoechoic masses in left breast at 3:00 4 cm fn, 3:00 3 cm fn, and 7:00 8 cm fn. No evidence of malignancy. BIRADS-3. Mammogram/sonogram recommended in one year. \par \par Pt notes she got extremely sick in November, diagnosed with HPMV. \par Pt has CP and presents in a wheelchair. \par Denies personal or family history of breast or ovarian cancer.

## 2022-11-16 NOTE — PHYSICAL EXAM
[Normal] : supple, no neck mass and thyroid not enlarged [Normal Neck Lymph Nodes] : normal neck lymph nodes  [Normal Supraclavicular Lymph Nodes] : normal supraclavicular lymph nodes [Normal Groin Lymph Nodes] : normal groin lymph nodes [Normal Axillary Lymph Nodes] : normal axillary lymph nodes [Normal] : oriented to person, place and time, with appropriate affect [de-identified] : no masses or adenopathy bilaterally

## 2022-11-18 ENCOUNTER — APPOINTMENT (OUTPATIENT)
Dept: INTERNAL MEDICINE | Facility: CLINIC | Age: 52
End: 2022-11-18

## 2022-12-15 ENCOUNTER — APPOINTMENT (OUTPATIENT)
Dept: UROLOGY | Facility: CLINIC | Age: 52
End: 2022-12-15

## 2022-12-16 ENCOUNTER — APPOINTMENT (OUTPATIENT)
Dept: UROLOGY | Facility: CLINIC | Age: 52
End: 2022-12-16

## 2022-12-21 ENCOUNTER — APPOINTMENT (OUTPATIENT)
Dept: OTOLARYNGOLOGY | Facility: CLINIC | Age: 52
End: 2022-12-21

## 2022-12-21 ENCOUNTER — APPOINTMENT (OUTPATIENT)
Dept: OBGYN | Facility: CLINIC | Age: 52
End: 2022-12-21

## 2022-12-21 ENCOUNTER — OUTPATIENT (OUTPATIENT)
Dept: OUTPATIENT SERVICES | Facility: HOSPITAL | Age: 52
LOS: 1 days | End: 2022-12-21
Payer: MEDICARE

## 2022-12-21 ENCOUNTER — APPOINTMENT (OUTPATIENT)
Dept: INTERNAL MEDICINE | Facility: CLINIC | Age: 52
End: 2022-12-21
Payer: MEDICARE

## 2022-12-21 DIAGNOSIS — D25.9 LEIOMYOMA OF UTERUS, UNSPECIFIED: ICD-10-CM

## 2022-12-21 DIAGNOSIS — Z00.00 ENCOUNTER FOR GENERAL ADULT MEDICAL EXAMINATION W/OUT ABNORMAL FINDINGS: ICD-10-CM

## 2022-12-21 DIAGNOSIS — B37.9 CANDIDIASIS, UNSPECIFIED: ICD-10-CM

## 2022-12-21 DIAGNOSIS — Z98.890 OTHER SPECIFIED POSTPROCEDURAL STATES: Chronic | ICD-10-CM

## 2022-12-21 DIAGNOSIS — I10 ESSENTIAL (PRIMARY) HYPERTENSION: ICD-10-CM

## 2022-12-21 DIAGNOSIS — N92.0 EXCESSIVE AND FREQUENT MENSTRUATION WITH REGULAR CYCLE: ICD-10-CM

## 2022-12-21 PROCEDURE — G0463: CPT

## 2022-12-21 PROCEDURE — 99442: CPT | Mod: 95

## 2022-12-21 PROCEDURE — 99213 OFFICE O/P EST LOW 20 MIN: CPT | Mod: 95

## 2022-12-21 RX ORDER — GUAIFENESIN SYRUP AND DEXTROMETHORPHAN 100; 10 MG/5ML; MG/5ML
100-10 SYRUP ORAL
Qty: 600 | Refills: 0 | Status: COMPLETED | COMMUNITY
Start: 2021-06-11 | End: 2022-12-21

## 2022-12-21 NOTE — HISTORY OF PRESENT ILLNESS
[Home] : at home, [unfilled] , at the time of the visit. [Medical Office: ([facility])___] : at the medical office located in  [Verbal consent obtained from patient] : the patient, [unfilled] [FreeTextEntry1] : Follow up  [de-identified] : 52F w/ hx of cervical myelopathy/cerebral palsy (wheelchair bound), uterine fibroids, anemia, IBS-D, chronic urinary incontinence (daily straight caths) with recurrent UTIs (on daily keflex), vaginitis, and BV, hyperprolactinemia, and subclinical hypothyroidism, presenting for follow-up. Last seen in our clinic in June 2022. \par \par No acute complaints today. She wanted to make sure she is up to date on her health maintenance. \par \par She received her COVID-19 bivalent booster last week. Had subjective fever, myalgia, and dizziness for a day but resolved without intervention.\par \par # Perimenopause\par She reports perimenopausal symptoms of mild hot flashes but no mood swings. In the past, she had regular periods with heavy bleeding that would last ~ 15 days. Since Feb 2022, her cycles had been more irregular with increased time between each. Her last period was in Oct with about 10 days of heavy bleeding and then spotting for the next few days. Since Oct, she has not had another period. \par \par # Recurrent UTIs\par - She reports that she has not had UTIs since she was placed on prophylactic keflex by ID. Her UTI symptoms are burning, frequency, and pelvic pain. She is still requiring straight cath once a day.

## 2022-12-21 NOTE — END OF VISIT
[FreeTextEntry3] : I, Clarisse Wooten, acted as a scribe on behalf of Dr. Hernandez Abdalla on 12/21/2022. \par \par All medical entries made by the scribe where at my, Dr. Hernandez Abdalla, direction and personally dictated by me on 12/21/2022. I have reviewed the chart and agree that the record accurately reflects my personal performance of the history, physical exam, assessment, and plan. I have also personally directed, reviewed and agreed with the chart.  [Time Spent: ___ minutes] : I have spent [unfilled] minutes of time on the encounter. [>50% of the face to face encounter time was spent on counseling and/or coordination of care for ___] : Greater than 50% of the face to face encounter time was spent on counseling and/or coordination of care for [unfilled]

## 2022-12-21 NOTE — HISTORY OF PRESENT ILLNESS
[FreeTextEntry1] : This visit was provided via Telehealth using real-time 2-way audio visual technology. The patient, MELANI BROTHERS, was located at home, [address] [place] [address]\[place] [address], at the time of the visit. The provider, CESAR ARZOLA  , was located at the medical office located in [address] [place] [address]  at the time of the visit. \par The patient, MELANI BROTHERS, and the Provider participated in the Telehealth encounter.\par Verbal consent for Telehealth services was given on 2022 by patient MELANI BROTHERS. \par \par 51 yo , presents for follow up regarding reviewing pelvic sono results. She reports that her menses have become irregular starting 2022. From August to October she had her period. Since then, no bleeding. Experiencing occasional mild hot flashes. Does report a distended abdomen. Pt had a renal sonogram, which did not show hydronephrosis and was wnl. Does report recurrent yeast infx, pt used Triamcinolone in October once nightly for 12 days, as well as Nystatin. She reports taking daily probiotics.\par \par GYNHx: fibroids\par PMHx: hypothyroidism, cerebral palsy/cervical myelopathy, wheelchair bound, neurogenic bladder/urine retention, osteoarthritis, IBS, fibroids, cervical myelopathy, chronic UTI on ppx Keflex, chronic yeast infx\par PSHx: bilateral hip/ankle surgeries as child (blood transfusion)\par Allergies: Erythromycin, Macrobid, Bactrim, Ciprofloxacin, Ceftin, Latex, adhesive, Ibuprofen, Diflucan, Ketoconazole \par \par PELVIC SONO 22:\par \par FINDINGS:\par Uterus: 19.4 cm x 8.1 cm x 19.7 cm. Markedly enlarged and heterogeneous with multiple fibroids. No change in a 3.6 cm posterior intramural fibroid; slightly increased right subserosal fibroid of 13.7 cm, previously 9 cm. A right subserosal fibroid of 10 cm.\par Endometrium: 8 mm. Within normal limits.\par \par Right ovary: Not visualized.\par Left ovary: Not visualized. A 3 cm simple cyst in the left adnexa.\par \par Fluid: None.\par \par IMPRESSION:\par Enlarged multi fibroid uterus.\par

## 2022-12-21 NOTE — PLAN
[FreeTextEntry1] : 53 yo , fibroid uterus, chronic yeast infx.\par \par Discussed the option of continued conservative observation of fibroids vs surgical removal. Treatment options of  hysterectomy and continued observation were also outlined and the risks, benefits, and alternatives provided. All questions answered and pt to notify. \par \par -pelvic MRI to be done in 3-4 months, pt to call for rx\par -consider TLH, pt to consider but for now continue to monitor as declines at this time\par -rto following pelvic MRI\par \par Chronic yeast infx\par -recommend referral to Amy [facility]

## 2022-12-21 NOTE — ASSESSMENT
[FreeTextEntry1] : \par 52F w/ hx of cervical myelopathy/cerebral palsy (wheelchair bound), uterine fibroids, anemia, IBS-D, chronic urinary incontinence (daily straight caths) with recurrent UTIs (on daily keflex), vaginitis, and BV, hyperprolactinemia, and subclinical hypothyroidism, presenting for follow-up\par \par # Irregular Menses / Uterine fibroids/ Perimenopause\par - previous baseline menorrhagia likely 2/2 uterine fibroids. Now with increased irregularity and recent lighter menstruation, likely 2/2 ismael-menopause\par - continue to monitor for ismael-menopause symptoms (e.g. hot flashes, worsening bladder/vaginal issues, dry skin, etc.)\par - UTD on pap smear \par \par # Cervical myelopathy / cerebral palsy / functional quadriplegia\par - c/w wheelchair-use and has HHA 12 hrs/day x7 days/wk\par - c/w Tylenol PRN, massage therapy, and heat packs for chronic joint pains\par - c/w PT and home exercises as tolerated\par \par # Chronic urinary incontinence/retention c/b recurrent UTIs, stable\par - 2/2 neurogenic bladder\par - c/w straight caths daily\par - c/w prophylactic Keflex daily given hx of recurrent UTIs\par - follows with Urology and ID\par \par # Recurrent vaginitis/BV/Vaginal candidiasis\par - c/w Nystatin ointment/cream PRN\par - c/w Terconazole vaginal suppositories PRN\par - follows with ID and GYN\par \par \par # HCM\par - Pap smear (11/2021): Negative\par - Mammogram (12/2022): BIRAD-3. Repeat in 6 months \par - Colonoscopy (11/2022): iFOBT negative in 11/2022\par - Dexa (11/2022): Osteopenia. Repeat DEXA scan in 2024\par \par # Vaccines\par - Flu: UTD (11/2022)\par - COVID-19: UTD with bi-valent booster\par - TDAP: UTD (11/2020)\par - Shingles: patient will obtain after new year \par \par Return to clinic for CPE in June 2023 or earlier if any concerns \par

## 2022-12-21 NOTE — REVIEW OF SYSTEMS
[Negative] : Heme/Lymph [Abn Vaginal bleeding] : abnormal vaginal bleeding [Urgency] : no urgency [Frequency] : no frequency [Incontinence] : no incontinence [Dysuria] : no dysuria [Pelvic pain] : no pelvic pain [CVA Pain] : no CVA pain [Genital Rash/Irritation] : no genital rash/irritation

## 2022-12-22 DIAGNOSIS — Z00.00 ENCOUNTER FOR GENERAL ADULT MEDICAL EXAMINATION WITHOUT ABNORMAL FINDINGS: ICD-10-CM

## 2022-12-27 ENCOUNTER — APPOINTMENT (OUTPATIENT)
Dept: INTERNAL MEDICINE | Facility: CLINIC | Age: 52
End: 2022-12-27

## 2022-12-27 PROCEDURE — 99443: CPT | Mod: CS,95

## 2022-12-27 NOTE — REVIEW OF SYSTEMS
[Itching] : Itching [FreeTextEntry2] : Constitutional:  no fever and no chills. \par Eyes:  no discharge. \par HEENT:  no earache. \par Cardiovascular:  no chest pain, no palpitations and no lower extremity edema. \par Respiratory:  no shortness of breath, no wheezing and no cough. \par Gastrointestinal:  no abdominal pain, no nausea and no vomiting. \par Genitourinary:  no dysuria. \par Musculoskeletal:  no joint pain. \par Integumentary:  no itching. \par Neurological:  no headache. \par Psychiatric:  not suicidal. \par Hematologic/Lymphatic:  no easy bleeding. [FreeTextEntry4] : see hpi [FreeTextEntry7] : see hpi [FreeTextEntry6] : see hpi [FreeTextEntry8] : see hpi

## 2022-12-27 NOTE — HISTORY OF PRESENT ILLNESS
[Home] : at home, [unfilled] , at the time of the visit. [Medical Office: ([facility])___] : at the medical office located in  [Verbal consent obtained from patient] : the patient, [unfilled] [FreeTextEntry8] : MELANI BROTHERS  is a 52 year old female  with history of cervical myelopathy/cerebral palsy (wheelchair bound), uterine fibroids, anemia, IBS-D, chronic urinary incontinence (daily straight caths by visiting nurse) with recurrent UTIs (on daily preventive keflex), vaginitis, and BV, hyperprolactinemia, and subclinical hypothyroidism presented today for COVID positive. \par We tried multiple times for TEB via Solo platform but it did not work. By Pt's agreement, we changed visit to TTM.\par \par Last seen in our clinic in June 2022.  Last TEB was  last week. \par \par Reports that they tested positive on   12/25/22     by home test.\par Date symptoms started 12/24/22\par Today is day 4\par \par Symptoms include: Nasal congestion, sore throat, cough, severe fatigue,  severe myalgias, occasional headache, temp 101F down to 99.8F, current chill\par Denies loss of smell and taste, CP, shortness of breath, dyspnea on exertion, abdominal pain, diarrhea, nausea and vomiting\par \par Taking the following medications for symptom relief: Tylenol, Mucinex, nasal saline, \par \par Patient reports exposure to known case of Covid 19: No\par COVID vaccination: fully vaccinated with 2 Pfizer shot and got 2 boosters. Last dose on 12/14/22.\par Quarantine: Pt lives alone. HHA is coming daily but pt is alone at night. Current SPO2 in RA 98-97%, -98

## 2022-12-27 NOTE — ASSESSMENT
[FreeTextEntry1] : MELANI BROTHERS  is a 52 year old female  with history of cervical myelopathy/cerebral palsy (wheelchair bound), uterine fibroids, anemia, IBS-D, chronic urinary incontinence (daily straight caths by visiting nurse) with recurrent UTIs (on daily preventive keflex), vaginitis, and BV, hyperprolactinemia, and subclinical hypothyroidism presented today for COVID positive. \par \par #COVID-19 infection with risk factors for severe infection\par \par Discussed with patient criteria for Oral Treatment including Paxlovid as first line treatment: informed pt on possible drug drug interaction with Paxlovid.\par Benefit/risk of medication was explained and pt expressed understood. \par Pt did not wish to try Paxlovid at this time given many allergy history to med, no HHA at night and lives alone. \par Sent Rx for Albuterol inhaler for W/C bound lifestyle and mucinex.\par Pt was anxious and we discussed for a lengthy time for quarantine and COVID test. \par \par Supportive care with Tylenol/ibuprofen as needed, drink plenty of fluids, Mucinex DM for cough, keep windows cracked open and a fan on. Consider pronation and frequent ambulation. Discussed need for isolation and quarantine for those exposed but testing pending. Advised patient to stay at home and rest. Advised calling the office if a high fever develops, if she becomes short of breath, or develops severe or worsening cough. Advised patient to go to the ER if experiencing trouble breathing or if shortness of breath is severe. Advised staying well hydrated with at least 6-8 glasses of water/fluid daily, try to eat small meals even if without an appetite, and to call the office for any issues or concerns. Advised getting Pulse oximeter to monitor O2 sats. If SpO2<90% should go to ED for oxygen therapy. Needs to quarantine for minimum 5 days until asymptomatic and no fever without any fever lowering medications. Placed on 24 hour call back list.

## 2022-12-27 NOTE — PHYSICAL EXAM
[de-identified] : TEB. General: Well-developed well-nourished in no apparent distress. Appears mildly ill. \par Respiratory: Speaking in complete sentences, no respiratory distress noted.\par Neuro: No focal deficits noted.\par

## 2023-01-04 ENCOUNTER — APPOINTMENT (OUTPATIENT)
Dept: MRI IMAGING | Facility: CLINIC | Age: 53
End: 2023-01-04

## 2023-01-04 ENCOUNTER — NON-APPOINTMENT (OUTPATIENT)
Age: 53
End: 2023-01-04

## 2023-01-10 ENCOUNTER — NON-APPOINTMENT (OUTPATIENT)
Age: 53
End: 2023-01-10

## 2023-01-11 ENCOUNTER — LABORATORY RESULT (OUTPATIENT)
Age: 53
End: 2023-01-11

## 2023-01-12 ENCOUNTER — LABORATORY RESULT (OUTPATIENT)
Age: 53
End: 2023-01-12

## 2023-01-13 ENCOUNTER — NON-APPOINTMENT (OUTPATIENT)
Age: 53
End: 2023-01-13

## 2023-01-18 ENCOUNTER — NON-APPOINTMENT (OUTPATIENT)
Age: 53
End: 2023-01-18

## 2023-01-20 ENCOUNTER — LABORATORY RESULT (OUTPATIENT)
Age: 53
End: 2023-01-20

## 2023-01-24 ENCOUNTER — NON-APPOINTMENT (OUTPATIENT)
Age: 53
End: 2023-01-24

## 2023-02-07 ENCOUNTER — APPOINTMENT (OUTPATIENT)
Dept: INFECTIOUS DISEASE | Facility: CLINIC | Age: 53
End: 2023-02-07
Payer: MEDICARE

## 2023-02-07 PROCEDURE — 99442: CPT | Mod: 95

## 2023-02-07 RX ORDER — TERCONAZOLE 8 MG/G
0.8 CREAM VAGINAL
Qty: 1 | Refills: 3 | Status: ACTIVE | COMMUNITY
Start: 2023-02-07 | End: 1900-01-01

## 2023-02-07 NOTE — HISTORY OF PRESENT ILLNESS
[FreeTextEntry1] : 51 y/o female called for f/u visit. \par \par She is on long term keflex for chronic UTI. No fever.\par \par No issues with her urine. No fever. \par \par Had COVID about 5 weeks ago. She recovered from COVID and has some occasional cough and fatigue. \par \par On and off yeast infection being managed by GYN.

## 2023-02-07 NOTE — ASSESSMENT
[FreeTextEntry1] : 52-year-old female comes for followup visit for UTI and chronic abx prophylaxis. Her  symptoms are stable overall. \par \par Continue Keflex for chronic prophylaxis. Pt seems to be overall stable since starting daily prophylaxis. Did use a course of fosfomycin recently with improvement of her symptoms.  \par \par No major urinary issues.  \par \par Got her COVID vaccine already. \par \par F/u with medical team and GYN for medical issues. \par \par \par \par  [Treatment Education] : treatment education [Treatment Adherence] : treatment adherence [Rx Dose / Side Effects] : Rx dose/side effects

## 2023-02-07 NOTE — REASON FOR VISIT
[Home] : at home, [unfilled] , at the time of the visit. [Medical Office: ([facility])___] : at the medical office located in  [Verbal consent obtained from patient] : the patient, [unfilled] [Follow-Up: _____] : a [unfilled] follow-up visit

## 2023-02-17 ENCOUNTER — APPOINTMENT (OUTPATIENT)
Dept: PHYSICAL MEDICINE AND REHAB | Facility: CLINIC | Age: 53
End: 2023-02-17

## 2023-02-22 ENCOUNTER — TRANSCRIPTION ENCOUNTER (OUTPATIENT)
Age: 53
End: 2023-02-22

## 2023-02-24 ENCOUNTER — APPOINTMENT (OUTPATIENT)
Dept: UROLOGY | Facility: CLINIC | Age: 53
End: 2023-02-24

## 2023-03-03 ENCOUNTER — APPOINTMENT (OUTPATIENT)
Dept: PHYSICAL MEDICINE AND REHAB | Facility: CLINIC | Age: 53
End: 2023-03-03

## 2023-03-05 ENCOUNTER — NON-APPOINTMENT (OUTPATIENT)
Age: 53
End: 2023-03-05

## 2023-03-10 ENCOUNTER — APPOINTMENT (OUTPATIENT)
Dept: PHYSICAL MEDICINE AND REHAB | Facility: CLINIC | Age: 53
End: 2023-03-10
Payer: MEDICARE

## 2023-03-10 PROCEDURE — 99213 OFFICE O/P EST LOW 20 MIN: CPT | Mod: 95

## 2023-03-10 NOTE — REASON FOR VISIT
[Home] : at home, [unfilled] , at the time of the visit. [Medical Office: ([facility])___] : at the medical office located in

## 2023-03-10 NOTE — HISTORY OF PRESENT ILLNESS
[FreeTextEntry1] : Face to face for power wheelchair.\par Overall doing well.\par Wheelchair \par L arm is uncomfortable due to position she sleeps in- diagnosed with pinched nerve- gets pain down LUE and into hand w numbness - it is impeding her ability to use her L hand. Was prescribed MRI- had difficulty w scheduling so has not had done yet. \par \par Needs new wheelchair- current wheelchair is seven years old- needs a tilt and recline wheelchair for pressure relief with head rest to support her neck. Also gets edema of the legs so will need elevating footplate. Needs adjusting seat height to assist with transfers.

## 2023-04-24 ENCOUNTER — APPOINTMENT (OUTPATIENT)
Dept: MRI IMAGING | Facility: IMAGING CENTER | Age: 53
End: 2023-04-24
Payer: MEDICARE

## 2023-04-24 ENCOUNTER — OUTPATIENT (OUTPATIENT)
Dept: OUTPATIENT SERVICES | Facility: HOSPITAL | Age: 53
LOS: 1 days | End: 2023-04-24
Payer: MEDICARE

## 2023-04-24 DIAGNOSIS — M48.02 SPINAL STENOSIS, CERVICAL REGION: ICD-10-CM

## 2023-04-24 DIAGNOSIS — M54.9 DORSALGIA, UNSPECIFIED: ICD-10-CM

## 2023-04-24 DIAGNOSIS — Z98.890 OTHER SPECIFIED POSTPROCEDURAL STATES: Chronic | ICD-10-CM

## 2023-04-24 PROCEDURE — 72141 MRI NECK SPINE W/O DYE: CPT | Mod: 26,MH

## 2023-04-24 PROCEDURE — 72146 MRI CHEST SPINE W/O DYE: CPT | Mod: 26,MH

## 2023-04-24 PROCEDURE — 72148 MRI LUMBAR SPINE W/O DYE: CPT

## 2023-04-24 PROCEDURE — 72148 MRI LUMBAR SPINE W/O DYE: CPT | Mod: 26,MH

## 2023-04-24 PROCEDURE — 72146 MRI CHEST SPINE W/O DYE: CPT

## 2023-04-24 PROCEDURE — 72141 MRI NECK SPINE W/O DYE: CPT

## 2023-04-25 ENCOUNTER — APPOINTMENT (OUTPATIENT)
Dept: MRI IMAGING | Facility: CLINIC | Age: 53
End: 2023-04-25

## 2023-05-08 ENCOUNTER — APPOINTMENT (OUTPATIENT)
Dept: OBGYN | Facility: CLINIC | Age: 53
End: 2023-05-08

## 2023-05-09 ENCOUNTER — APPOINTMENT (OUTPATIENT)
Dept: OPHTHALMOLOGY | Facility: CLINIC | Age: 53
End: 2023-05-09

## 2023-05-12 ENCOUNTER — APPOINTMENT (OUTPATIENT)
Dept: ENDOCRINOLOGY | Facility: CLINIC | Age: 53
End: 2023-05-12
Payer: MEDICARE

## 2023-05-12 VITALS
DIASTOLIC BLOOD PRESSURE: 72 MMHG | SYSTOLIC BLOOD PRESSURE: 102 MMHG | WEIGHT: 130 LBS | OXYGEN SATURATION: 98 % | HEIGHT: 59 IN | HEART RATE: 96 BPM | BODY MASS INDEX: 26.21 KG/M2

## 2023-05-12 PROCEDURE — 99215 OFFICE O/P EST HI 40 MIN: CPT

## 2023-05-14 NOTE — HISTORY OF PRESENT ILLNESS
[FreeTextEntry1] : 52 y.o. female with h/o CP, hyperprolactinemia and subclinical hypothyroidism here for follow up visit. She did get both doses of COVID-19 vaccine (Pfizer) and 2 boosters. She did get COVID-19 on 12/24/22 with moderate symptoms. She reports numbness on left arm and 4th/5th tingling. She did not see neurology but following with PMR. She had viral syndrome in May 2021 and went to the hospital. Reports irregular menses and follows with GYN. Reports h/o heavy menstrual cycle. Does have fibroids. Notes periods irregular since COVID booster in Jan 2022, shorter and getting spotting instead. C/o dry skin. Had avulsion fracture in the past. Last brain MRI in June 2014 showed no obvious pituitary lesion but was noncontrast. Does get frontal headaches but not severe. No vision changes. No galactorrhea. C/o hair loss which is stable. Has been taking iron 27 mg daily (less than before). Dealing with chronic UTIs and BV/yeast infection. Follows with ID. Does have daily urine catheterization. Saw cardiology for palpitations, reports now improved. H/o IBS.\par \par In regards to thyroid disease (subclinical hypothyroidism), did not tolerate treatment with T4 in the past. C/o fatigue. No neck complaints. Reports weight gain. Eating less bagels but too much cheese.  No PT now but doing some OT at home.\par \par Regarding bone health, wheel chair bound therefore unable to perform weight bearing activity. Takes vitamin D3 5,000 IU daily. Eats leafy greens and cheese. No recent falls or fractures.\par \par DEXA scan in November 2017 shows spine -0.9 (arthritic changes) , left femoral neck -0.5, total hip -0.6 and 1/3 radius 0.2. \par DEXA scan in November 2022 shows spine -1.1 with 2.1% decrease, right femoral neck -0.6 and total hip -0.6 which is stable and 1/3 radius is 0.5.

## 2023-05-14 NOTE — REVIEW OF SYSTEMS
[Fatigue] : fatigue [Recent Weight Gain (___ Lbs)] : recent weight gain: [unfilled] lbs [Irregular Menses] : irregular menses [Joint Pain] : joint pain [Dry Skin] : dry skin [Hair Loss] : hair loss [Headaches] : headaches [Swelling] : swelling [Hot Flashes] : hot flashes [Negative] : Respiratory [Constipation] : no constipation [Polydipsia] : no polydipsia

## 2023-05-14 NOTE — ASSESSMENT
[FreeTextEntry1] : 52 y.o. female with h/o hyperprolactinemia, subclinical hypothyroidism, vitamin D def, hyperlipidemia and osteopenia\par \par \par 1. Hyperprolactinemia- Suspect pituitary microadenoma. Since getting menses and no galactorrhea, have been monitoring given medication sensitivity. If prolactin level is stable, will continue to monitor. Now with lighter/spotty menses, may be related to COVID-19, possible perimenopause, or hyperprolactinemia. Will check serum prolactin and sex hormones. \par \par 2. Subclinical hypothyroidism- Will check TFTs and if stable, will continue to monitor.\par \par 3. Vitamin D def- Will check 25 vitamin D level and will continue supplement. \par \par 4. Anemia- Will check CBC and iron studies.\par \par 5. Hyperlipidemia and overweight- Encouraged a low fat diet, carbohydrate consistent diet. Will check lipids and Hba1c. \par \par 6. Osteopenia- DEXA scan performed in 11/2022 shows spine -1.1 with 2.1% decrease, right femoral neck -0.6 and total hip -0.6 which is stable and 1/3 radius is 0.5. Risk of fracture is average. Recommend monitoring for now and will repeat DEXA scan in 2024. Encouraged weight bearing activity as tolerated. Discussed appropriate calcium and vitamin D intake. Will check 25 vitamin D level. \par \par If stable, follow up in 6 months\par May need to consider neurology evaluation given left UE symptoms

## 2023-05-14 NOTE — PHYSICAL EXAM
[Alert] : alert [No Acute Distress] : no acute distress [Normal Sclera/Conjunctiva] : normal sclera/conjunctiva [EOMI] : extra ocular movement intact [No LAD] : no lymphadenopathy [Thyroid Not Enlarged] : the thyroid was not enlarged [No Respiratory Distress] : no respiratory distress [No Thyroid Nodules] : no palpable thyroid nodules [Clear to Auscultation] : lungs were clear to auscultation bilaterally [Normal S1, S2] : normal S1 and S2 [Regular Rhythm] : with a regular rhythm [Normal Bowel Sounds] : normal bowel sounds [Not Tender] : non-tender [Not Distended] : not distended [Soft] : abdomen soft [Normal Anterior Cervical Nodes] : no anterior cervical lymphadenopathy [No Clubbing, Cyanosis] : no clubbing  or cyanosis of the fingernails [No Rash] : no rash [No Tremors] : no tremors [Normal Affect] : the affect was normal [Normal Mood] : the mood was normal [Acanthosis Nigricans] : no acanthosis nigricans [de-identified] : b/l edema [de-identified] : contracted UE B/L, using wheelchair

## 2023-05-15 LAB
25(OH)D3 SERPL-MCNC: 55.4 NG/ML
ALBUMIN SERPL ELPH-MCNC: 4.3 G/DL
ALP BLD-CCNC: 75 U/L
ALT SERPL-CCNC: 22 U/L
ANION GAP SERPL CALC-SCNC: 13 MMOL/L
AST SERPL-CCNC: 17 U/L
BASOPHILS # BLD AUTO: 0.05 K/UL
BASOPHILS NFR BLD AUTO: 0.8 %
BILIRUB SERPL-MCNC: 0.2 MG/DL
BUN SERPL-MCNC: 20 MG/DL
CALCIUM SERPL-MCNC: 9.4 MG/DL
CHLORIDE SERPL-SCNC: 101 MMOL/L
CHOLEST SERPL-MCNC: 220 MG/DL
CO2 SERPL-SCNC: 23 MMOL/L
COVID-19 NUCLEOCAPSID  GAM ANTIBODY INTERPRETATION: POSITIVE
COVID-19 SPIKE DOMAIN ANTIBODY INTERPRETATION: POSITIVE
CREAT SERPL-MCNC: 0.34 MG/DL
EGFR: 124 ML/MIN/1.73M2
EOSINOPHIL # BLD AUTO: 0.11 K/UL
EOSINOPHIL NFR BLD AUTO: 1.8 %
ESTIMATED AVERAGE GLUCOSE: 103 MG/DL
ESTRADIOL SERPL-MCNC: 65 PG/ML
FERRITIN SERPL-MCNC: 44 NG/ML
FOLATE SERPL-MCNC: 15.7 NG/ML
FSH SERPL-MCNC: 5.2 IU/L
GLUCOSE SERPL-MCNC: 84 MG/DL
HBA1C MFR BLD HPLC: 5.2 %
HCT VFR BLD CALC: 38.5 %
HDLC SERPL-MCNC: 81 MG/DL
HGB BLD-MCNC: 12.6 G/DL
IMM GRANULOCYTES NFR BLD AUTO: 0.2 %
IRON SATN MFR SERPL: 33 %
IRON SERPL-MCNC: 106 UG/DL
LDLC SERPL CALC-MCNC: 130 MG/DL
LH SERPL-ACNC: 3.5 IU/L
LYMPHOCYTES # BLD AUTO: 1.79 K/UL
LYMPHOCYTES NFR BLD AUTO: 29.5 %
MAN DIFF?: NORMAL
MCHC RBC-ENTMCNC: 29.9 PG
MCHC RBC-ENTMCNC: 32.7 GM/DL
MCV RBC AUTO: 91.4 FL
MONOCYTES # BLD AUTO: 0.52 K/UL
MONOCYTES NFR BLD AUTO: 8.6 %
NEUTROPHILS # BLD AUTO: 3.58 K/UL
NEUTROPHILS NFR BLD AUTO: 59.1 %
NONHDLC SERPL-MCNC: 139 MG/DL
PLATELET # BLD AUTO: 230 K/UL
POTASSIUM SERPL-SCNC: 4.4 MMOL/L
PROLACTIN SERPL-MCNC: 147 NG/ML
PROT SERPL-MCNC: 7 G/DL
RBC # BLD: 4.21 M/UL
RBC # FLD: 13.1 %
SARS-COV-2 AB SERPL IA-ACNC: >250 U/ML
SARS-COV-2 AB SERPL QL IA: 11.3 INDEX
SODIUM SERPL-SCNC: 137 MMOL/L
T4 FREE SERPL-MCNC: 1 NG/DL
TIBC SERPL-MCNC: 328 UG/DL
TRIGL SERPL-MCNC: 46 MG/DL
TSH SERPL-ACNC: 4.93 UIU/ML
UIBC SERPL-MCNC: 221 UG/DL
VIT B12 SERPL-MCNC: >2000 PG/ML
WBC # FLD AUTO: 6.06 K/UL

## 2023-05-31 ENCOUNTER — NON-APPOINTMENT (OUTPATIENT)
Age: 53
End: 2023-05-31

## 2023-06-16 ENCOUNTER — APPOINTMENT (OUTPATIENT)
Dept: INTERNAL MEDICINE | Facility: CLINIC | Age: 53
End: 2023-06-16

## 2023-06-16 ENCOUNTER — RESULT REVIEW (OUTPATIENT)
Age: 53
End: 2023-06-16

## 2023-06-16 ENCOUNTER — APPOINTMENT (OUTPATIENT)
Dept: INTERNAL MEDICINE | Facility: CLINIC | Age: 53
End: 2023-06-16
Payer: MEDICARE

## 2023-06-16 ENCOUNTER — OUTPATIENT (OUTPATIENT)
Dept: OUTPATIENT SERVICES | Facility: HOSPITAL | Age: 53
LOS: 1 days | End: 2023-06-16
Payer: MEDICARE

## 2023-06-16 DIAGNOSIS — Z98.890 OTHER SPECIFIED POSTPROCEDURAL STATES: Chronic | ICD-10-CM

## 2023-06-16 DIAGNOSIS — I10 ESSENTIAL (PRIMARY) HYPERTENSION: ICD-10-CM

## 2023-06-16 DIAGNOSIS — R20.0 ANESTHESIA OF SKIN: ICD-10-CM

## 2023-06-16 PROCEDURE — 99442: CPT | Mod: 95

## 2023-06-16 PROCEDURE — G0463: CPT

## 2023-06-23 ENCOUNTER — APPOINTMENT (OUTPATIENT)
Dept: UROLOGY | Facility: CLINIC | Age: 53
End: 2023-06-23

## 2023-06-24 ENCOUNTER — NON-APPOINTMENT (OUTPATIENT)
Age: 53
End: 2023-06-24

## 2023-06-26 ENCOUNTER — NON-APPOINTMENT (OUTPATIENT)
Age: 53
End: 2023-06-26

## 2023-06-28 ENCOUNTER — NON-APPOINTMENT (OUTPATIENT)
Age: 53
End: 2023-06-28

## 2023-06-29 ENCOUNTER — APPOINTMENT (OUTPATIENT)
Dept: UROLOGY | Facility: CLINIC | Age: 53
End: 2023-06-29
Payer: MEDICARE

## 2023-06-29 VITALS
RESPIRATION RATE: 17 BRPM | HEIGHT: 59 IN | SYSTOLIC BLOOD PRESSURE: 101 MMHG | HEART RATE: 77 BPM | DIASTOLIC BLOOD PRESSURE: 63 MMHG | BODY MASS INDEX: 26.21 KG/M2 | WEIGHT: 130 LBS

## 2023-06-29 PROCEDURE — 99215 OFFICE O/P EST HI 40 MIN: CPT

## 2023-06-29 NOTE — HISTORY OF PRESENT ILLNESS
[de-identified] : 54 y/o female presents for a follow-up visit. \par History multiple bilateral benign US guided needle biopsies (fibrocystic changes and fibroadenomas) \par \par \par \par Bilateral mammo/sono performed on 11/10/22 showed extremely dense breasts. No radiographic evidence of malignancy and no significant radiographic change. Newly seen probably benign hypoechoic nodules by sonography at the 10:00 (6 cm from the nipple) right breast and 12:00 (1 cm from the nipple) and 3:00 (5 cm from the nipple) left breast for which follow-up bilateral targeted sonography in 6 months is recommended, to confirm their stability. (BI-RADS 3)\par \par Bilateral mammo/sono performed on 10/29/21 showed no mammographic or sonographic evidence of malignancy. (BI-RADS 2)\par \par MMG/US 8/14/20: BI-RADS 2\par Extremely dense breasts. No mammographic evidence of malignancy. No significant change. No sonographic evidence of malignancy.\par \par US 2/5/20: BI-RADS 3\par At 7:00, 8 cm from the nipple, there is a 0.5 x 0.2 x 0.6 cm circumscribed hypoechoic mass, stable since 05/14/2019. \par At 3:00, 3 cm from the nipple, there is a 0.8 x 0.2 x 3.5 cm probable cyst versus hypoechoic mass, stable since 05/14/2019. \par At 3:00, 4 cm from the nipple, there is a 0.4 x 0.2 x 0.3 cm circumscribed hypoechoic mass, stable since 05/14/2019.\par Stable bilateral circumscribed hypoechoic masses, probably benign, for which six-month ultrasound follow-up in May 2020 at time of annual mammogram is recommended.\par \par Bilateral MMG/US 5/14/19: Multiple bilateral cysts and nodules bilaterally. Right breast 12:00 4 cm fn: newly noted 0.5 cm circumscribed hypoechoic mass. Newly noted circumscribed hypoechoic masses in left breast at 3:00 4 cm fn, 3:00 3 cm fn, and 7:00 8 cm fn. No evidence of malignancy. BIRADS-3. Mammogram/sonogram recommended in one year. \par \par Pt notes she got extremely sick in November, diagnosed with HPMV. \par Pt has CP and presents in a wheelchair. \par Denies personal or family history of breast or ovarian cancer.

## 2023-06-29 NOTE — ASSESSMENT
[FreeTextEntry1] : Plan:\par \par Repeat renal US in one year from Nov 2022\par \par [facility] \par \par Biophresh suppository - 1 suppostory every 3 days \par \par increase water intake\par \par pt needs assistance with straight cath, it needs to be performed 2x daily by the visiting nurse team\par \par RTO 1 in year

## 2023-06-29 NOTE — HISTORY OF PRESENT ILLNESS
[FreeTextEntry1] : 53 yr old, wheelchair bound patient with hx of Neurogenic bladder and Moise. \par Last seen by Dr Whitman - Sept 2020\par hx CP, tethered cord syndrome, multiple surgeries\par since 2012, pt has been taking Keflex prophylactically \par \par Performing CIC by home nursing, based on logistics, BID by two care providers\par \par Denies constipation, hx of IBS\par \par last renal sono: Nov 2022

## 2023-06-29 NOTE — ADDENDUM
[FreeTextEntry1] : I, Sasha De Los Santos, acted solely as a scribe for Dr. Nav Garza on this date 06/30/2023.

## 2023-06-29 NOTE — PHYSICAL EXAM
[General Appearance - Well Developed] : well developed [Normal Appearance] : normal appearance [Abdomen Soft] : soft [Edema] : no peripheral edema [] : no respiratory distress [Not Anxious] : not anxious [FreeTextEntry1] : wheelchair

## 2023-06-29 NOTE — PHYSICAL EXAM
[Normal] : supple, no neck mass and thyroid not enlarged [Normal Neck Lymph Nodes] : normal neck lymph nodes  [Normal Supraclavicular Lymph Nodes] : normal supraclavicular lymph nodes [Normal Groin Lymph Nodes] : normal groin lymph nodes [Normal Axillary Lymph Nodes] : normal axillary lymph nodes [Normal] : oriented to person, place and time, with appropriate affect [de-identified] : no masses or adenopathy bilaterally

## 2023-06-30 ENCOUNTER — APPOINTMENT (OUTPATIENT)
Dept: SURGICAL ONCOLOGY | Facility: CLINIC | Age: 53
End: 2023-06-30

## 2023-06-30 LAB
APPEARANCE: CLEAR
BACTERIA: NEGATIVE /HPF
BILIRUBIN URINE: NEGATIVE
BLOOD URINE: NEGATIVE
CAST: 0 /LPF
COLOR: YELLOW
EPITHELIAL CELLS: 0 /HPF
GLUCOSE QUALITATIVE U: NEGATIVE MG/DL
KETONES URINE: NEGATIVE MG/DL
LEUKOCYTE ESTERASE URINE: NEGATIVE
MICROSCOPIC-UA: NORMAL
NITRITE URINE: NEGATIVE
PH URINE: 6
PROTEIN URINE: NEGATIVE MG/DL
RED BLOOD CELLS URINE: 0 /HPF
SPECIFIC GRAVITY URINE: 1.01
UROBILINOGEN URINE: 0.2 MG/DL
WHITE BLOOD CELLS URINE: 0 /HPF

## 2023-07-03 LAB — BACTERIA UR CULT: NORMAL

## 2023-07-07 ENCOUNTER — NON-APPOINTMENT (OUTPATIENT)
Age: 53
End: 2023-07-07

## 2023-07-07 ENCOUNTER — APPOINTMENT (OUTPATIENT)
Dept: CARDIOLOGY | Facility: CLINIC | Age: 53
End: 2023-07-07
Payer: MEDICARE

## 2023-07-07 VITALS — DIASTOLIC BLOOD PRESSURE: 60 MMHG | SYSTOLIC BLOOD PRESSURE: 100 MMHG

## 2023-07-07 VITALS
SYSTOLIC BLOOD PRESSURE: 93 MMHG | HEIGHT: 59 IN | WEIGHT: 130 LBS | BODY MASS INDEX: 26.21 KG/M2 | DIASTOLIC BLOOD PRESSURE: 58 MMHG | OXYGEN SATURATION: 98 % | HEART RATE: 84 BPM

## 2023-07-07 DIAGNOSIS — U07.1 COVID-19: ICD-10-CM

## 2023-07-07 DIAGNOSIS — R00.2 PALPITATIONS: ICD-10-CM

## 2023-07-07 PROCEDURE — 93000 ELECTROCARDIOGRAM COMPLETE: CPT

## 2023-07-07 PROCEDURE — 99214 OFFICE O/P EST MOD 30 MIN: CPT

## 2023-07-07 NOTE — DISCUSSION/SUMMARY
[FreeTextEntry1] : Ms Hinkle has been experiencing fatigue and lassitude along with some forceful heart beating.  Her exam shows regular rhythm, a BP of 100/60, clear lungs, normal cardiac exam, and about 2+ peripheral edema.  An EKG shows sinus rhythm with borderline right axis deviation and is normal.\par \par She has no evidence of cardiac involvement from COVID.  I reassured her that while she may have long COVID, there was no sign of any cardiac complications.\par \par We also discussed starting a statin for her moderately elevated LDL cholesterol.  She is very reluctant to do so and I think it is fair to hold off for now.  She will follow-up with a as needed basis. [EKG obtained to assist in diagnosis and management of assessed problem(s)] : EKG obtained to assist in diagnosis and management of assessed problem(s)

## 2023-07-07 NOTE — HISTORY OF PRESENT ILLNESS
[FreeTextEntry1] : 53-year-old female with a history of cerebral palsy and quadriplegia.  She was seen about 20 months ago in evaluation for rapid heart beating.  Her work-up and echocardiogram were within normal limits.  She contracted COVID over the winter and reports she has symptoms of long COVID with fatigue and low blood pressure.  She is concerned that the infection may have affected her heart.\par \par She is mildly hypercholesterolemic with an LV LDL of 130 most recently.  She states she is hypothyroid and unable to tolerate Synthroid.  She wonders if the hypothyroidism could contribute to her hypercholesterolemia.

## 2023-07-12 ENCOUNTER — APPOINTMENT (OUTPATIENT)
Dept: INFECTIOUS DISEASE | Facility: CLINIC | Age: 53
End: 2023-07-12

## 2023-07-26 ENCOUNTER — LABORATORY RESULT (OUTPATIENT)
Age: 53
End: 2023-07-26

## 2023-08-08 ENCOUNTER — LABORATORY RESULT (OUTPATIENT)
Age: 53
End: 2023-08-08

## 2023-08-09 ENCOUNTER — LABORATORY RESULT (OUTPATIENT)
Age: 53
End: 2023-08-09

## 2023-08-10 ENCOUNTER — RESULT REVIEW (OUTPATIENT)
Age: 53
End: 2023-08-10

## 2023-08-10 ENCOUNTER — APPOINTMENT (OUTPATIENT)
Dept: MRI IMAGING | Facility: IMAGING CENTER | Age: 53
End: 2023-08-10
Payer: MEDICARE

## 2023-08-10 ENCOUNTER — APPOINTMENT (OUTPATIENT)
Dept: ULTRASOUND IMAGING | Facility: IMAGING CENTER | Age: 53
End: 2023-08-10
Payer: MEDICARE

## 2023-08-10 ENCOUNTER — OUTPATIENT (OUTPATIENT)
Dept: OUTPATIENT SERVICES | Facility: HOSPITAL | Age: 53
LOS: 1 days | End: 2023-08-10
Payer: MEDICARE

## 2023-08-10 DIAGNOSIS — Z98.890 OTHER SPECIFIED POSTPROCEDURAL STATES: Chronic | ICD-10-CM

## 2023-08-10 DIAGNOSIS — M48.02 SPINAL STENOSIS, CERVICAL REGION: ICD-10-CM

## 2023-08-10 PROCEDURE — 73221 MRI JOINT UPR EXTREM W/O DYE: CPT | Mod: 26,RT,MH

## 2023-08-10 PROCEDURE — 76642 ULTRASOUND BREAST LIMITED: CPT | Mod: 26,50

## 2023-08-10 PROCEDURE — 73221 MRI JOINT UPR EXTREM W/O DYE: CPT

## 2023-08-10 PROCEDURE — 76642 ULTRASOUND BREAST LIMITED: CPT

## 2023-08-14 ENCOUNTER — NON-APPOINTMENT (OUTPATIENT)
Age: 53
End: 2023-08-14

## 2023-08-21 ENCOUNTER — APPOINTMENT (OUTPATIENT)
Dept: INFECTIOUS DISEASE | Facility: CLINIC | Age: 53
End: 2023-08-21
Payer: MEDICARE

## 2023-08-21 ENCOUNTER — NON-APPOINTMENT (OUTPATIENT)
Age: 53
End: 2023-08-21

## 2023-08-21 PROCEDURE — 99212 OFFICE O/P EST SF 10 MIN: CPT | Mod: 95

## 2023-08-23 ENCOUNTER — LABORATORY RESULT (OUTPATIENT)
Age: 53
End: 2023-08-23

## 2023-08-23 ENCOUNTER — APPOINTMENT (OUTPATIENT)
Dept: SURGICAL ONCOLOGY | Facility: CLINIC | Age: 53
End: 2023-08-23

## 2023-08-25 ENCOUNTER — OUTPATIENT (OUTPATIENT)
Dept: OUTPATIENT SERVICES | Facility: HOSPITAL | Age: 53
LOS: 1 days | End: 2023-08-25
Payer: MEDICARE

## 2023-08-25 ENCOUNTER — APPOINTMENT (OUTPATIENT)
Dept: INTERNAL MEDICINE | Facility: CLINIC | Age: 53
End: 2023-08-25
Payer: MEDICARE

## 2023-08-25 VITALS
SYSTOLIC BLOOD PRESSURE: 132 MMHG | HEIGHT: 59 IN | DIASTOLIC BLOOD PRESSURE: 74 MMHG | HEART RATE: 85 BPM | OXYGEN SATURATION: 98 %

## 2023-08-25 DIAGNOSIS — M47.22 OTHER SPONDYLOSIS WITH MYELOPATHY, CERVICAL REGION: ICD-10-CM

## 2023-08-25 DIAGNOSIS — I10 ESSENTIAL (PRIMARY) HYPERTENSION: ICD-10-CM

## 2023-08-25 DIAGNOSIS — M47.12 OTHER SPONDYLOSIS WITH MYELOPATHY, CERVICAL REGION: ICD-10-CM

## 2023-08-25 DIAGNOSIS — Z98.890 OTHER SPECIFIED POSTPROCEDURAL STATES: Chronic | ICD-10-CM

## 2023-08-25 DIAGNOSIS — R53.2 FUNCTIONAL QUADRIPLEGIA: ICD-10-CM

## 2023-08-25 PROCEDURE — G0463: CPT

## 2023-08-25 PROCEDURE — 99214 OFFICE O/P EST MOD 30 MIN: CPT | Mod: GC

## 2023-08-25 RX ORDER — FOSFOMYCIN TROMETHAMINE 3 G/1
3 POWDER ORAL
Qty: 3 | Refills: 0 | Status: DISCONTINUED | COMMUNITY
Start: 2023-01-10 | End: 2023-08-25

## 2023-08-25 RX ORDER — GUAIFENESIN 600 MG/1
600 TABLET, EXTENDED RELEASE ORAL
Qty: 28 | Refills: 0 | Status: DISCONTINUED | COMMUNITY
Start: 2022-12-27 | End: 2023-08-25

## 2023-08-25 RX ORDER — OXICONAZOLE NITRATE 10 MG/ML
1 LOTION TOPICAL DAILY
Refills: 0 | Status: DISCONTINUED | COMMUNITY
Start: 2019-02-12 | End: 2023-08-25

## 2023-08-25 RX ORDER — METRONIDAZOLE 7.5 MG/G
0.75 GEL VAGINAL
Qty: 70 | Refills: 0 | Status: DISCONTINUED | COMMUNITY
Start: 2018-03-16 | End: 2023-08-25

## 2023-08-25 RX ORDER — TERBINAFINE HCL 1 %
1 CREAM (GRAM) TOPICAL
Qty: 1 | Refills: 2 | Status: DISCONTINUED | COMMUNITY
Start: 2021-08-13 | End: 2023-08-25

## 2023-08-25 NOTE — REVIEW OF SYSTEMS
[As Noted in HPI] : as noted in HPI [Pelvic Pain] : pelvic pain [Fatigue] : fatigue [Dysuria] : dysuria [Frequency] : frequency [Joint Pain] : joint pain [Fever] : no fever [Chills] : no chills [Recent Change In Weight] : ~T no recent weight change [Chest Pain] : no chest pain [Palpitations] : no palpitations [Lower Ext Edema] : no lower extremity edema [Orthopnea] : no orthopnea [Shortness Of Breath] : no shortness of breath [Wheezing] : no wheezing [Cough] : no cough [Dyspnea on Exertion] : no dyspnea on exertion [Abdominal Pain] : no abdominal pain [Nausea] : no nausea [Vomiting] : no vomiting [Skin Rash] : no skin rash [Headache] : no headache [Dizziness] : no dizziness [Anxiety] : no anxiety [Depression] : no depression

## 2023-08-25 NOTE — HISTORY OF PRESENT ILLNESS
[FreeTextEntry1] : CPE [de-identified] : 53F w/ hx of cervical myelopathy/cerebral palsy (wheelchair bound), uterine fibroids, anemia, IBS-D, chronic urinary incontinence (daily straight caths) with recurrent UTIs (on daily keflex), vaginitis, and BV, hyperprolactinemia, and subclinical hypothyroidism, presenting for CPE.   Pt had COVID 12/2022, still experiencing sxs of long COVID, fatigue, and brain fog. She feels since COVID, she has had increased sensitivity to pain. Endorsing left arm numbness and pain that is continuing to bother her and now affecting her sleep quality. She had MRI C/T/L spine done showing severe spinal stenosis 2 months ago, has followup appointment with them in 1 week. She is continuing home PT/OT which she finds helpful. Discussed neuropathic pain meds gabapentin or pregabalin but does not want to them. She has not been evaluated by neurosurgery.    She also saw endocrine 2 months ago and had lab work done which shows prolactin oveerall stable. She denied any headaches or visual symptoms. Otherwise, she had no acute complaints.

## 2023-08-25 NOTE — HEALTH RISK ASSESSMENT
[Good] : ~his/her~  mood as  good [No] : No [0] : 2) Feeling down, depressed, or hopeless: Not at all (0) [PHQ-2 Negative - No further assessment needed] : PHQ-2 Negative - No further assessment needed [Patient reported mammogram was abnormal] : Patient reported mammogram was abnormal [Patient reported PAP Smear was normal] : Patient reported PAP Smear was normal [Single] : single [Full assistance needed] : managing finances [Never] : Never [[id number]] : 0 [MammogramDate] : 08/2023 [MammogramComments] : BIRAD-3. Repeat in 6 months  [PapSmearDate] : 11/2021 [PapSmearComments] : Negative  [BoneDensityComments] : Osteopenia  [ColonoscopyDate] : 11/2022

## 2023-08-25 NOTE — HISTORY OF PRESENT ILLNESS
[FreeTextEntry1] : CPE [de-identified] : 53F w/ hx of cervical myelopathy/cerebral palsy (wheelchair bound), uterine fibroids, anemia, IBS-D, chronic urinary incontinence (daily straight caths) with recurrent UTIs (on daily keflex), vaginitis, and BV, hyperprolactinemia, and subclinical hypothyroidism, presenting for CPE.   Pt had COVID 12/2022, still experiencing sxs of long COVID, fatigue, and brain fog. She feels since COVID, she has had increased sensitivity to pain. Endorsing left arm numbness and pain that is continuing to bother her and now affecting her sleep quality. She had MRI C/T/L spine done showing severe spinal stenosis 2 months ago, has followup appointment with them in 1 week. She is continuing home PT/OT which she finds helpful. Discussed neuropathic pain meds gabapentin or pregabalin but does not want to them. She has not been evaluated by neurosurgery.    She also saw endocrine 2 months ago and had lab work done which shows prolactin oveerall stable. She denied any headaches or visual symptoms. Otherwise, she had no acute complaints.

## 2023-08-25 NOTE — HEALTH RISK ASSESSMENT
[Good] : ~his/her~  mood as  good [No] : No [0] : 2) Feeling down, depressed, or hopeless: Not at all (0) [PHQ-2 Negative - No further assessment needed] : PHQ-2 Negative - No further assessment needed [Patient reported mammogram was abnormal] : Patient reported mammogram was abnormal [Patient reported PAP Smear was normal] : Patient reported PAP Smear was normal [Single] : single [Full assistance needed] : managing finances [Never] : Never [[id number]] : 0 [MammogramDate] : 08/2023 [MammogramComments] : BIRAD-3. Repeat in 6 months  [PapSmearDate] : 11/2021 [PapSmearComments] : Negative  [BoneDensityComments] : Osteopenia  [ColonoscopyDate] : 11/2022

## 2023-08-25 NOTE — ASSESSMENT
[FreeTextEntry1] :  # HCM - Pap smear (11/2021): Negative - Mammogram (08/2023): BIRAD-3. Repeat in 6 months. - Colonoscopy (11/2022): iFOBT negative in 11/2022. iFOBT given  - Dexa (11/2022): Osteopenia. Repeat DEXA scan in 2024  # Vaccines - Flu: UTD  - COVID-19: UTD with bi-valent booster - TDAP: UTD (11/2020) - Shingles: patient declined   Return to clinic in 6 months for follow up

## 2023-08-25 NOTE — REVIEW OF SYSTEMS
[As Noted in HPI] : as noted in HPI [Pelvic Pain] : pelvic pain [Fatigue] : fatigue [Dysuria] : dysuria [Frequency] : frequency [Joint Pain] : joint pain [Fever] : no fever [Chills] : no chills [Recent Change In Weight] : ~T no recent weight change [Chest Pain] : no chest pain [Palpitations] : no palpitations [Lower Ext Edema] : no lower extremity edema [Orthopnea] : no orthopnea [Shortness Of Breath] : no shortness of breath [Wheezing] : no wheezing [Cough] : no cough [Dyspnea on Exertion] : no dyspnea on exertion [Abdominal Pain] : no abdominal pain [Vomiting] : no vomiting [Nausea] : no nausea [Skin Rash] : no skin rash [Headache] : no headache [Dizziness] : no dizziness [Anxiety] : no anxiety [Depression] : no depression

## 2023-08-25 NOTE — PHYSICAL EXAM
[No Acute Distress] : no acute distress [Normal Sclera/Conjunctiva] : normal sclera/conjunctiva [PERRL] : pupils equal round and reactive to light [No Accessory Muscle Use] : no accessory muscle use [Clear to Auscultation] : lungs were clear to auscultation bilaterally [Normal Rate] : normal rate  [Regular Rhythm] : with a regular rhythm [Normal S1, S2] : normal S1 and S2 [No Murmur] : no murmur heard [No Edema] : there was no peripheral edema [Soft] : abdomen soft [Non Tender] : non-tender [Non-distended] : non-distended [Normal Bowel Sounds] : normal bowel sounds [No Joint Swelling] : no joint swelling [No Rash] : no rash [Normal Affect] : the affect was normal [Alert and Oriented x3] : oriented to person, place, and time [Normal Insight/Judgement] : insight and judgment were intact [de-identified] : Wheelchair bound. A&Ox3, decreased sensation of LUE compared to RUE. Equal motor strength upper and lower extremity bilaterally

## 2023-08-25 NOTE — PHYSICAL EXAM
[No Acute Distress] : no acute distress [Normal Sclera/Conjunctiva] : normal sclera/conjunctiva [PERRL] : pupils equal round and reactive to light [No Accessory Muscle Use] : no accessory muscle use [Clear to Auscultation] : lungs were clear to auscultation bilaterally [Normal Rate] : normal rate  [Regular Rhythm] : with a regular rhythm [Normal S1, S2] : normal S1 and S2 [No Murmur] : no murmur heard [No Edema] : there was no peripheral edema [Soft] : abdomen soft [Non Tender] : non-tender [Non-distended] : non-distended [Normal Bowel Sounds] : normal bowel sounds [No Joint Swelling] : no joint swelling [No Rash] : no rash [Normal Affect] : the affect was normal [Alert and Oriented x3] : oriented to person, place, and time [Normal Insight/Judgement] : insight and judgment were intact [de-identified] : Wheelchair bound. A&Ox3, decreased sensation of LUE compared to RUE. Equal motor strength upper and lower extremity bilaterally

## 2023-08-29 DIAGNOSIS — G80.9 CEREBRAL PALSY, UNSPECIFIED: ICD-10-CM

## 2023-08-29 DIAGNOSIS — M47.22 OTHER SPONDYLOSIS WITH RADICULOPATHY, CERVICAL REGION: ICD-10-CM

## 2023-08-29 DIAGNOSIS — R53.2 FUNCTIONAL QUADRIPLEGIA: ICD-10-CM

## 2023-08-29 DIAGNOSIS — N76.0 ACUTE VAGINITIS: ICD-10-CM

## 2023-08-29 DIAGNOSIS — E78.5 HYPERLIPIDEMIA, UNSPECIFIED: ICD-10-CM

## 2023-08-29 DIAGNOSIS — G95.9 DISEASE OF SPINAL CORD, UNSPECIFIED: ICD-10-CM

## 2023-08-29 DIAGNOSIS — R26.9 UNSPECIFIED ABNORMALITIES OF GAIT AND MOBILITY: ICD-10-CM

## 2023-08-29 DIAGNOSIS — M47.12 OTHER SPONDYLOSIS WITH MYELOPATHY, CERVICAL REGION: ICD-10-CM

## 2023-08-30 ENCOUNTER — APPOINTMENT (OUTPATIENT)
Dept: OTOLARYNGOLOGY | Facility: CLINIC | Age: 53
End: 2023-08-30

## 2023-08-31 ENCOUNTER — NON-APPOINTMENT (OUTPATIENT)
Age: 53
End: 2023-08-31

## 2023-09-01 ENCOUNTER — NON-APPOINTMENT (OUTPATIENT)
Age: 53
End: 2023-09-01

## 2023-09-07 ENCOUNTER — APPOINTMENT (OUTPATIENT)
Dept: PHYSICAL MEDICINE AND REHAB | Facility: CLINIC | Age: 53
End: 2023-09-07

## 2023-09-08 ENCOUNTER — NON-APPOINTMENT (OUTPATIENT)
Age: 53
End: 2023-09-08

## 2023-09-08 ENCOUNTER — APPOINTMENT (OUTPATIENT)
Dept: OPHTHALMOLOGY | Facility: CLINIC | Age: 53
End: 2023-09-08
Payer: MEDICARE

## 2023-09-08 PROCEDURE — 92004 COMPRE OPH EXAM NEW PT 1/>: CPT

## 2023-09-08 PROCEDURE — 92015 DETERMINE REFRACTIVE STATE: CPT | Mod: NC

## 2023-09-08 PROCEDURE — 92083 EXTENDED VISUAL FIELD XM: CPT

## 2023-09-12 ENCOUNTER — NON-APPOINTMENT (OUTPATIENT)
Age: 53
End: 2023-09-12

## 2023-09-18 ENCOUNTER — LABORATORY RESULT (OUTPATIENT)
Age: 53
End: 2023-09-18

## 2023-09-20 ENCOUNTER — LABORATORY RESULT (OUTPATIENT)
Age: 53
End: 2023-09-20

## 2023-09-22 ENCOUNTER — APPOINTMENT (OUTPATIENT)
Dept: PHYSICAL MEDICINE AND REHAB | Facility: CLINIC | Age: 53
End: 2023-09-22

## 2023-09-25 ENCOUNTER — NON-APPOINTMENT (OUTPATIENT)
Age: 53
End: 2023-09-25

## 2023-09-28 ENCOUNTER — APPOINTMENT (OUTPATIENT)
Dept: PHYSICAL MEDICINE AND REHAB | Facility: CLINIC | Age: 53
End: 2023-09-28
Payer: MEDICARE

## 2023-09-28 DIAGNOSIS — G89.29 PAIN IN RIGHT SHOULDER: ICD-10-CM

## 2023-09-28 DIAGNOSIS — M25.511 PAIN IN RIGHT SHOULDER: ICD-10-CM

## 2023-09-28 DIAGNOSIS — M25.512 PAIN IN RIGHT SHOULDER: ICD-10-CM

## 2023-09-28 PROCEDURE — 99214 OFFICE O/P EST MOD 30 MIN: CPT

## 2023-10-03 ENCOUNTER — NON-APPOINTMENT (OUTPATIENT)
Age: 53
End: 2023-10-03

## 2023-10-04 ENCOUNTER — NON-APPOINTMENT (OUTPATIENT)
Age: 53
End: 2023-10-04

## 2023-10-06 ENCOUNTER — NON-APPOINTMENT (OUTPATIENT)
Age: 53
End: 2023-10-06

## 2023-10-06 ENCOUNTER — LABORATORY RESULT (OUTPATIENT)
Age: 53
End: 2023-10-06

## 2023-10-09 ENCOUNTER — LABORATORY RESULT (OUTPATIENT)
Age: 53
End: 2023-10-09

## 2023-10-13 DIAGNOSIS — R19.7 DIARRHEA, UNSPECIFIED: ICD-10-CM

## 2023-10-18 ENCOUNTER — APPOINTMENT (OUTPATIENT)
Dept: INFECTIOUS DISEASE | Facility: CLINIC | Age: 53
End: 2023-10-18
Payer: MEDICARE

## 2023-10-18 VITALS
SYSTOLIC BLOOD PRESSURE: 130 MMHG | TEMPERATURE: 97.6 F | DIASTOLIC BLOOD PRESSURE: 87 MMHG | HEIGHT: 59 IN | HEART RATE: 76 BPM | OXYGEN SATURATION: 97 %

## 2023-10-18 PROCEDURE — 99213 OFFICE O/P EST LOW 20 MIN: CPT

## 2023-10-20 ENCOUNTER — APPOINTMENT (OUTPATIENT)
Dept: PHYSICAL MEDICINE AND REHAB | Facility: CLINIC | Age: 53
End: 2023-10-20

## 2023-10-26 ENCOUNTER — NON-APPOINTMENT (OUTPATIENT)
Age: 53
End: 2023-10-26

## 2023-10-26 RX ORDER — DOCOSANOL 100 MG/G
10 CREAM TOPICAL DAILY
Qty: 1 | Refills: 0 | Status: DISCONTINUED | COMMUNITY
Start: 2022-05-11 | End: 2023-10-26

## 2023-10-26 RX ORDER — CEPHALEXIN 500 MG/1
500 TABLET ORAL EVERY 8 HOURS
Qty: 24 | Refills: 0 | Status: DISCONTINUED | COMMUNITY
Start: 2023-08-30 | End: 2023-10-26

## 2023-10-26 RX ORDER — ALBUTEROL SULFATE 90 UG/1
108 (90 BASE) INHALANT RESPIRATORY (INHALATION)
Qty: 1 | Refills: 2 | Status: DISCONTINUED | COMMUNITY
Start: 2022-12-27 | End: 2023-10-26

## 2023-10-26 RX ORDER — FOSFOMYCIN TROMETHAMINE 3 G/1
3 POWDER ORAL DAILY
Qty: 3 | Refills: 0 | Status: DISCONTINUED | COMMUNITY
Start: 2023-07-31 | End: 2023-10-26

## 2023-10-30 ENCOUNTER — LABORATORY RESULT (OUTPATIENT)
Age: 53
End: 2023-10-30

## 2023-11-01 ENCOUNTER — NON-APPOINTMENT (OUTPATIENT)
Age: 53
End: 2023-11-01

## 2023-11-03 ENCOUNTER — APPOINTMENT (OUTPATIENT)
Dept: OTOLARYNGOLOGY | Facility: CLINIC | Age: 53
End: 2023-11-03
Payer: MEDICARE

## 2023-11-03 ENCOUNTER — APPOINTMENT (OUTPATIENT)
Dept: OBGYN | Facility: CLINIC | Age: 53
End: 2023-11-03
Payer: MEDICARE

## 2023-11-03 VITALS
WEIGHT: 130 LBS | HEART RATE: 94 BPM | SYSTOLIC BLOOD PRESSURE: 105 MMHG | BODY MASS INDEX: 26.21 KG/M2 | DIASTOLIC BLOOD PRESSURE: 63 MMHG | TEMPERATURE: 97.5 F | HEIGHT: 59 IN

## 2023-11-03 VITALS
HEIGHT: 59 IN | WEIGHT: 130 LBS | SYSTOLIC BLOOD PRESSURE: 106 MMHG | DIASTOLIC BLOOD PRESSURE: 58 MMHG | BODY MASS INDEX: 26.21 KG/M2

## 2023-11-03 DIAGNOSIS — Z01.10 ENCOUNTER FOR EXAMINATION OF EARS AND HEARING W/OUT ABNORMAL FINDINGS: ICD-10-CM

## 2023-11-03 DIAGNOSIS — Z11.51 ENCOUNTER FOR SCREENING FOR HUMAN PAPILLOMAVIRUS (HPV): ICD-10-CM

## 2023-11-03 DIAGNOSIS — Z01.411 ENCOUNTER FOR GYNECOLOGICAL EXAMINATION (GENERAL) (ROUTINE) WITH ABNORMAL FINDINGS: ICD-10-CM

## 2023-11-03 PROCEDURE — 99212 OFFICE O/P EST SF 10 MIN: CPT | Mod: 25

## 2023-11-03 PROCEDURE — 99213 OFFICE O/P EST LOW 20 MIN: CPT | Mod: 25

## 2023-11-03 PROCEDURE — 69210 REMOVE IMPACTED EAR WAX UNI: CPT

## 2023-11-03 PROCEDURE — 99396 PREV VISIT EST AGE 40-64: CPT | Mod: GY

## 2023-11-03 PROCEDURE — G0101: CPT

## 2023-11-03 RX ORDER — NYSTATIN AND TRIAMCINOLONE ACETONIDE 100000; 1 MG/G; MG/G
100000-0.1 CREAM TOPICAL
Qty: 1 | Refills: 6 | Status: ACTIVE | COMMUNITY
Start: 2022-05-11 | End: 1900-01-01

## 2023-11-03 RX ORDER — TERCONAZOLE 80 MG/1
80 SUPPOSITORY VAGINAL
Qty: 3 | Refills: 6 | Status: ACTIVE | COMMUNITY
Start: 2021-11-05 | End: 1900-01-01

## 2023-11-03 RX ORDER — NYSTATIN 100000 [USP'U]/G
100000 CREAM TOPICAL TWICE DAILY
Qty: 2 | Refills: 6 | Status: ACTIVE | COMMUNITY
Start: 2021-11-05 | End: 1900-01-01

## 2023-11-10 ENCOUNTER — OUTPATIENT (OUTPATIENT)
Dept: OUTPATIENT SERVICES | Facility: HOSPITAL | Age: 53
LOS: 1 days | End: 2023-11-10
Payer: MEDICARE

## 2023-11-10 ENCOUNTER — APPOINTMENT (OUTPATIENT)
Dept: ULTRASOUND IMAGING | Facility: IMAGING CENTER | Age: 53
End: 2023-11-10
Payer: MEDICARE

## 2023-11-10 DIAGNOSIS — Z98.890 OTHER SPECIFIED POSTPROCEDURAL STATES: Chronic | ICD-10-CM

## 2023-11-10 DIAGNOSIS — N31.9 NEUROMUSCULAR DYSFUNCTION OF BLADDER, UNSPECIFIED: ICD-10-CM

## 2023-11-10 PROBLEM — Z01.411 ENCOUNTER FOR GYNECOLOGICAL EXAMINATION (GENERAL) (ROUTINE) WITH ABNORMAL FINDINGS: Status: ACTIVE | Noted: 2021-11-05

## 2023-11-10 PROBLEM — Z11.51 SCREENING FOR HUMAN PAPILLOMAVIRUS: Status: ACTIVE | Noted: 2021-11-05

## 2023-11-10 PROCEDURE — 76770 US EXAM ABDO BACK WALL COMP: CPT | Mod: 26

## 2023-11-10 PROCEDURE — 76770 US EXAM ABDO BACK WALL COMP: CPT

## 2023-11-13 ENCOUNTER — LABORATORY RESULT (OUTPATIENT)
Age: 53
End: 2023-11-13

## 2023-11-17 ENCOUNTER — APPOINTMENT (OUTPATIENT)
Dept: UROLOGY | Facility: CLINIC | Age: 53
End: 2023-11-17
Payer: MEDICARE

## 2023-11-17 DIAGNOSIS — N31.9 NEUROMUSCULAR DYSFUNCTION OF BLADDER, UNSPECIFIED: ICD-10-CM

## 2023-11-17 DIAGNOSIS — Q06.8 OTHER SPECIFIED CONGENITAL MALFORMATIONS OF SPINAL CORD: ICD-10-CM

## 2023-11-17 DIAGNOSIS — R32 UNSPECIFIED URINARY INCONTINENCE: ICD-10-CM

## 2023-11-17 DIAGNOSIS — Z78.9 OTHER SPECIFIED HEALTH STATUS: ICD-10-CM

## 2023-11-17 PROCEDURE — 99214 OFFICE O/P EST MOD 30 MIN: CPT | Mod: 95

## 2023-11-20 ENCOUNTER — LABORATORY RESULT (OUTPATIENT)
Age: 53
End: 2023-11-20

## 2023-11-30 ENCOUNTER — APPOINTMENT (OUTPATIENT)
Dept: MRI IMAGING | Facility: CLINIC | Age: 53
End: 2023-11-30
Payer: MEDICARE

## 2023-11-30 ENCOUNTER — OUTPATIENT (OUTPATIENT)
Dept: OUTPATIENT SERVICES | Facility: HOSPITAL | Age: 53
LOS: 1 days | End: 2023-11-30
Payer: MEDICARE

## 2023-11-30 DIAGNOSIS — D21.9 BENIGN NEOPLASM OF CONNECTIVE AND OTHER SOFT TISSUE, UNSPECIFIED: ICD-10-CM

## 2023-11-30 DIAGNOSIS — Z98.890 OTHER SPECIFIED POSTPROCEDURAL STATES: Chronic | ICD-10-CM

## 2023-11-30 PROCEDURE — 72195 MRI PELVIS W/O DYE: CPT | Mod: 26,MH

## 2023-11-30 PROCEDURE — 72195 MRI PELVIS W/O DYE: CPT

## 2023-12-01 ENCOUNTER — APPOINTMENT (OUTPATIENT)
Dept: ENDOCRINOLOGY | Facility: CLINIC | Age: 53
End: 2023-12-01
Payer: MEDICARE

## 2023-12-01 ENCOUNTER — APPOINTMENT (OUTPATIENT)
Dept: OBGYN | Facility: CLINIC | Age: 53
End: 2023-12-01
Payer: MEDICARE

## 2023-12-01 VITALS
BODY MASS INDEX: 26.21 KG/M2 | WEIGHT: 130 LBS | SYSTOLIC BLOOD PRESSURE: 100 MMHG | HEIGHT: 59 IN | HEART RATE: 95 BPM | DIASTOLIC BLOOD PRESSURE: 60 MMHG | OXYGEN SATURATION: 96 %

## 2023-12-01 VITALS
DIASTOLIC BLOOD PRESSURE: 58 MMHG | WEIGHT: 130 LBS | BODY MASS INDEX: 26.21 KG/M2 | SYSTOLIC BLOOD PRESSURE: 105 MMHG | HEIGHT: 59 IN

## 2023-12-01 DIAGNOSIS — E66.3 OVERWEIGHT: ICD-10-CM

## 2023-12-01 DIAGNOSIS — N76.0 ACUTE VAGINITIS: ICD-10-CM

## 2023-12-01 DIAGNOSIS — D21.9 BENIGN NEOPLASM OF CONNECTIVE AND OTHER SOFT TISSUE, UNSPECIFIED: ICD-10-CM

## 2023-12-01 DIAGNOSIS — Z01.419 ENCOUNTER FOR GYNECOLOGICAL EXAMINATION (GENERAL) (ROUTINE) W/OUT ABNORMAL FINDINGS: ICD-10-CM

## 2023-12-01 PROCEDURE — ZZZZZ: CPT

## 2023-12-01 PROCEDURE — 99214 OFFICE O/P EST MOD 30 MIN: CPT

## 2023-12-04 LAB
25(OH)D3 SERPL-MCNC: 77.1 NG/ML
ALBUMIN SERPL ELPH-MCNC: 4.3 G/DL
ALP BLD-CCNC: 80 U/L
ALT SERPL-CCNC: 14 U/L
ANION GAP SERPL CALC-SCNC: 11 MMOL/L
AST SERPL-CCNC: 13 U/L
BASOPHILS # BLD AUTO: 0.05 K/UL
BASOPHILS NFR BLD AUTO: 0.9 %
BILIRUB SERPL-MCNC: 0.2 MG/DL
BUN SERPL-MCNC: 11 MG/DL
CALCIUM SERPL-MCNC: 9.4 MG/DL
CHLORIDE SERPL-SCNC: 103 MMOL/L
CHOLEST SERPL-MCNC: 243 MG/DL
CO2 SERPL-SCNC: 23 MMOL/L
CORTIS SERPL-MCNC: 13.4 UG/DL
COVID-19 NUCLEOCAPSID  GAM ANTIBODY INTERPRETATION: POSITIVE
COVID-19 SPIKE DOMAIN ANTIBODY INTERPRETATION: POSITIVE
CREAT SERPL-MCNC: 0.32 MG/DL
EGFR: 125 ML/MIN/1.73M2
EOSINOPHIL # BLD AUTO: 0.14 K/UL
EOSINOPHIL NFR BLD AUTO: 2.6 %
ESTIMATED AVERAGE GLUCOSE: 94 MG/DL
ESTRADIOL SERPL-MCNC: 24 PG/ML
FERRITIN SERPL-MCNC: 37 NG/ML
FOLATE SERPL-MCNC: >20 NG/ML
FSH SERPL-MCNC: 27.2 IU/L
GLUCOSE SERPL-MCNC: 76 MG/DL
HBA1C MFR BLD HPLC: 4.9 %
HCT VFR BLD CALC: 40.1 %
HDLC SERPL-MCNC: 87 MG/DL
HGB BLD-MCNC: 13.1 G/DL
IMM GRANULOCYTES NFR BLD AUTO: 0.2 %
IRON SATN MFR SERPL: 20 %
IRON SERPL-MCNC: 67 UG/DL
LDLC SERPL CALC-MCNC: 150 MG/DL
LH SERPL-ACNC: 17.9 IU/L
LYMPHOCYTES # BLD AUTO: 2.05 K/UL
LYMPHOCYTES NFR BLD AUTO: 38.2 %
MAN DIFF?: NORMAL
MCHC RBC-ENTMCNC: 29.9 PG
MCHC RBC-ENTMCNC: 32.7 GM/DL
MCV RBC AUTO: 91.6 FL
MONOCYTES # BLD AUTO: 0.29 K/UL
MONOCYTES NFR BLD AUTO: 5.4 %
NEUTROPHILS # BLD AUTO: 2.82 K/UL
NEUTROPHILS NFR BLD AUTO: 52.7 %
NONHDLC SERPL-MCNC: 156 MG/DL
PLATELET # BLD AUTO: 245 K/UL
POTASSIUM SERPL-SCNC: 4.1 MMOL/L
PROLACTIN SERPL-MCNC: 91.1 NG/ML
PROT SERPL-MCNC: 7.4 G/DL
RBC # BLD: 4.38 M/UL
RBC # FLD: 12.5 %
SARS-COV-2 AB SERPL IA-ACNC: >250 U/ML
SARS-COV-2 AB SERPL QL IA: 4.33 INDEX
SODIUM SERPL-SCNC: 138 MMOL/L
T4 FREE SERPL-MCNC: 1.1 NG/DL
TIBC SERPL-MCNC: 340 UG/DL
TRIGL SERPL-MCNC: 37 MG/DL
TSH SERPL-ACNC: 3.76 UIU/ML
UIBC SERPL-MCNC: 274 UG/DL
VIT B12 SERPL-MCNC: >2000 PG/ML
WBC # FLD AUTO: 5.36 K/UL

## 2023-12-07 ENCOUNTER — RESULT REVIEW (OUTPATIENT)
Age: 53
End: 2023-12-07

## 2023-12-07 ENCOUNTER — APPOINTMENT (OUTPATIENT)
Dept: ULTRASOUND IMAGING | Facility: IMAGING CENTER | Age: 53
End: 2023-12-07
Payer: MEDICARE

## 2023-12-07 ENCOUNTER — APPOINTMENT (OUTPATIENT)
Dept: MAMMOGRAPHY | Facility: IMAGING CENTER | Age: 53
End: 2023-12-07
Payer: MEDICARE

## 2023-12-07 ENCOUNTER — OUTPATIENT (OUTPATIENT)
Dept: OUTPATIENT SERVICES | Facility: HOSPITAL | Age: 53
LOS: 1 days | End: 2023-12-07
Payer: MEDICARE

## 2023-12-07 DIAGNOSIS — Z00.8 ENCOUNTER FOR OTHER GENERAL EXAMINATION: ICD-10-CM

## 2023-12-07 DIAGNOSIS — Z98.890 OTHER SPECIFIED POSTPROCEDURAL STATES: Chronic | ICD-10-CM

## 2023-12-07 PROCEDURE — 77063 BREAST TOMOSYNTHESIS BI: CPT | Mod: 26

## 2023-12-07 PROCEDURE — 77067 SCR MAMMO BI INCL CAD: CPT | Mod: 26

## 2023-12-07 PROCEDURE — 76641 ULTRASOUND BREAST COMPLETE: CPT | Mod: 26,50,GY

## 2023-12-07 PROCEDURE — 77063 BREAST TOMOSYNTHESIS BI: CPT

## 2023-12-07 PROCEDURE — 77066 DX MAMMO INCL CAD BI: CPT

## 2023-12-07 PROCEDURE — G0279: CPT

## 2023-12-07 PROCEDURE — 77067 SCR MAMMO BI INCL CAD: CPT

## 2023-12-07 PROCEDURE — 76641 ULTRASOUND BREAST COMPLETE: CPT

## 2023-12-08 ENCOUNTER — NON-APPOINTMENT (OUTPATIENT)
Age: 53
End: 2023-12-08

## 2023-12-13 ENCOUNTER — APPOINTMENT (OUTPATIENT)
Dept: INFECTIOUS DISEASE | Facility: CLINIC | Age: 53
End: 2023-12-13
Payer: MEDICARE

## 2023-12-13 PROCEDURE — 99441: CPT | Mod: 95

## 2023-12-13 NOTE — ASSESSMENT
[FreeTextEntry1] : Discussed questionable significance of bacteriuria without pyuria.  While technically cannot call it "asymptomatic bacteriuria" (unless dysuria is from some other pathology) but advised that would not treat cultures in absence of pyuria as sign of infection. Will repeat UA/C&S now.  Technically does not need sterilized urine to undergo breast biopsy.

## 2023-12-13 NOTE — HISTORY OF PRESENT ILLNESS
[FreeTextEntry1] : Reports cloudy urine and low-grade dysuria, variable severity on different days.  No longer taking Keflex Last UA/C&S with no pyuria but bacteriuria. Symptoms not significantly different since then.  Has seen GYN and had normal vaginal exam and negatve BV screen (including on candida) Being workup up for breast mass with planned biospy

## 2023-12-15 ENCOUNTER — APPOINTMENT (OUTPATIENT)
Dept: ULTRASOUND IMAGING | Facility: IMAGING CENTER | Age: 53
End: 2023-12-15
Payer: MEDICARE

## 2023-12-15 ENCOUNTER — RESULT REVIEW (OUTPATIENT)
Age: 53
End: 2023-12-15

## 2023-12-15 ENCOUNTER — OUTPATIENT (OUTPATIENT)
Dept: OUTPATIENT SERVICES | Facility: HOSPITAL | Age: 53
LOS: 1 days | End: 2023-12-15
Payer: MEDICARE

## 2023-12-15 DIAGNOSIS — Z00.8 ENCOUNTER FOR OTHER GENERAL EXAMINATION: ICD-10-CM

## 2023-12-15 PROCEDURE — A4648: CPT

## 2023-12-15 PROCEDURE — 19083 BX BREAST 1ST LESION US IMAG: CPT

## 2023-12-15 PROCEDURE — 88305 TISSUE EXAM BY PATHOLOGIST: CPT

## 2023-12-15 PROCEDURE — 19083 BX BREAST 1ST LESION US IMAG: CPT | Mod: RT

## 2023-12-15 PROCEDURE — 88305 TISSUE EXAM BY PATHOLOGIST: CPT | Mod: 26

## 2023-12-15 PROCEDURE — 77065 DX MAMMO INCL CAD UNI: CPT

## 2023-12-15 PROCEDURE — 77065 DX MAMMO INCL CAD UNI: CPT | Mod: 26,RT

## 2023-12-19 LAB
SURGICAL PATHOLOGY STUDY: SIGNIFICANT CHANGE UP
SURGICAL PATHOLOGY STUDY: SIGNIFICANT CHANGE UP

## 2023-12-20 ENCOUNTER — LABORATORY RESULT (OUTPATIENT)
Age: 53
End: 2023-12-20

## 2024-01-03 ENCOUNTER — TRANSCRIPTION ENCOUNTER (OUTPATIENT)
Age: 54
End: 2024-01-03

## 2024-01-04 ENCOUNTER — NON-APPOINTMENT (OUTPATIENT)
Age: 54
End: 2024-01-04

## 2024-01-05 ENCOUNTER — APPOINTMENT (OUTPATIENT)
Dept: PHYSICAL MEDICINE AND REHAB | Facility: CLINIC | Age: 54
End: 2024-01-05

## 2024-01-08 ENCOUNTER — RESULT REVIEW (OUTPATIENT)
Age: 54
End: 2024-01-08

## 2024-01-08 ENCOUNTER — NON-APPOINTMENT (OUTPATIENT)
Age: 54
End: 2024-01-08

## 2024-01-08 ENCOUNTER — APPOINTMENT (OUTPATIENT)
Dept: NEUROSURGERY | Facility: CLINIC | Age: 54
End: 2024-01-08
Payer: MEDICARE

## 2024-01-08 PROCEDURE — 99203 OFFICE O/P NEW LOW 30 MIN: CPT

## 2024-01-10 ENCOUNTER — APPOINTMENT (OUTPATIENT)
Dept: INTERNAL MEDICINE | Facility: CLINIC | Age: 54
End: 2024-01-10

## 2024-01-12 ENCOUNTER — APPOINTMENT (OUTPATIENT)
Dept: SURGICAL ONCOLOGY | Facility: CLINIC | Age: 54
End: 2024-01-12

## 2024-01-14 NOTE — PHYSICAL EXAM
[General Appearance - Alert] : alert [General Appearance - In No Acute Distress] : in no acute distress [Oriented To Time, Place, And Person] : oriented to person, place, and time [Impaired Insight] : insight and judgment were intact [Cranial Nerves Optic (II)] : visual acuity intact bilaterally,  pupils equal round and reactive to light [Cranial Nerves Oculomotor (III)] : extraocular motion intact [Cranial Nerves Trigeminal (V)] : facial sensation intact symmetrically [Cranial Nerves Facial (VII)] : face symmetrical [Cranial Nerves Vestibulocochlear (VIII)] : hearing was intact bilaterally [Cranial Nerves Glossopharyngeal (IX)] : tongue and palate midline [Cranial Nerves Accessory (XI - Cranial And Spinal)] : head turning and shoulder shrug symmetric [Cranial Nerves Hypoglossal (XII)] : there was no tongue deviation with protrusion [Deformity] : deformity [] : no respiratory distress [Heart Rate And Rhythm] : heart rate was normal and rhythm regular [Skin Color & Pigmentation] : normal skin color and pigmentation [FreeTextEntry1] : CP; Wheelchair dependent

## 2024-01-14 NOTE — ASSESSMENT
[FreeTextEntry1] : Ms. MELANI BROTHERS is a 53 year- old- woman who is presents with cervical Myelomalacia Imaging and diagnostic workup evaluation show evidence of cervical myelomalacia   Plan: Additional comprehensive workup recommended to finalize plan of care. Letter completed for 12 hour night assistance.   Follow up: after diagnostic workup is completed.    Please see Dr. Simon's dictation for details. I, Dr. Carrie Simon evaluated the patient with the nurse practitioner [place] and established the plan of care. I discussed this patient with the nurse practitioner during the visit. I agree with the assessment and plan as written unless noted below.

## 2024-01-14 NOTE — HISTORY OF PRESENT ILLNESS
[< 3 months] : less than 3 months [FreeTextEntry1] : upper extremity weakness and neck pain [de-identified] : Ms. MELANI BROTHERS is a 53 year-old- woman presenting with a PMHx CP since birth, Wheelchair dependent, IBS, Neurogenic bladder who presents for comprehensive neurosurgical evaluation of cervical spine cervical myelopathy. Today, the pt reports that symptoms started 5- 6 weeks ago with worsening upper extremity weakness and newer onset of weakness in her legs which is worse from her baseline. She receives 12 hours of home care services during the day and none at night. She is concerned about her bilateral upper extremity weakness could cause her to lose her remaining function. She reports bilateral neck pain, R>L. She reports that she is fearful of medication for pain that might interfere with her medication regimen. She comes today to discuss her MRI findings.

## 2024-01-14 NOTE — REASON FOR VISIT
[New Patient Visit] : a new patient visit [Referred By: _________] : Patient was referred by RUBIO [Formal Caregiver] : formal caregiver

## 2024-01-14 NOTE — REVIEW OF SYSTEMS
[As Noted in HPI] : as noted in HPI [Arm Weakness] : arm weakness [Hand Weakness] :  hand weakness [Leg Weakness] : leg weakness [Inability to Walk] : inability to walk [de-identified] : Wheelchair dependent

## 2024-01-16 NOTE — REVIEW OF SYSTEMS
[Frequency] : frequency [Back Pain] : back pain [Fever] : no fever [Chills] : no chills [Chest Pain] : no chest pain [Palpitations] : no palpitations [Shortness Of Breath] : no shortness of breath [Wheezing] : no wheezing [Cough] : no cough [Abdominal Pain] : no abdominal pain [Nausea] : no nausea [Constipation] : no constipation [Dysuria] : no dysuria [Incontinence] : no incontinence [Headache] : no headache [Dizziness] : no dizziness

## 2024-01-16 NOTE — ASSESSMENT
[FreeTextEntry1] :  Letter of support for additional home care aid hours provided. Patient will follow up with me in March 2024.

## 2024-01-26 ENCOUNTER — APPOINTMENT (OUTPATIENT)
Dept: CT IMAGING | Facility: IMAGING CENTER | Age: 54
End: 2024-01-26
Payer: MEDICARE

## 2024-01-26 ENCOUNTER — APPOINTMENT (OUTPATIENT)
Dept: PHYSICAL MEDICINE AND REHAB | Facility: CLINIC | Age: 54
End: 2024-01-26

## 2024-01-26 ENCOUNTER — APPOINTMENT (OUTPATIENT)
Dept: RADIOLOGY | Facility: IMAGING CENTER | Age: 54
End: 2024-01-26
Payer: MEDICARE

## 2024-01-26 ENCOUNTER — NON-APPOINTMENT (OUTPATIENT)
Age: 54
End: 2024-01-26

## 2024-01-26 ENCOUNTER — OUTPATIENT (OUTPATIENT)
Dept: OUTPATIENT SERVICES | Facility: HOSPITAL | Age: 54
LOS: 1 days | End: 2024-01-26
Payer: MEDICARE

## 2024-01-26 ENCOUNTER — APPOINTMENT (OUTPATIENT)
Dept: MRI IMAGING | Facility: IMAGING CENTER | Age: 54
End: 2024-01-26
Payer: MEDICARE

## 2024-01-26 DIAGNOSIS — G95.9 DISEASE OF SPINAL CORD, UNSPECIFIED: ICD-10-CM

## 2024-01-26 DIAGNOSIS — M25.511 PAIN IN RIGHT SHOULDER: ICD-10-CM

## 2024-01-26 DIAGNOSIS — M48.02 SPINAL STENOSIS, CERVICAL REGION: ICD-10-CM

## 2024-01-26 DIAGNOSIS — M47.12 OTHER SPONDYLOSIS WITH MYELOPATHY, CERVICAL REGION: ICD-10-CM

## 2024-01-26 DIAGNOSIS — Z98.890 OTHER SPECIFIED POSTPROCEDURAL STATES: Chronic | ICD-10-CM

## 2024-01-26 PROCEDURE — 72052 X-RAY EXAM NECK SPINE 6/>VWS: CPT | Mod: 26

## 2024-01-26 PROCEDURE — 73030 X-RAY EXAM OF SHOULDER: CPT | Mod: 26,RT

## 2024-01-26 PROCEDURE — 72141 MRI NECK SPINE W/O DYE: CPT | Mod: 26,MH

## 2024-01-26 PROCEDURE — 72125 CT NECK SPINE W/O DYE: CPT

## 2024-01-26 PROCEDURE — 72052 X-RAY EXAM NECK SPINE 6/>VWS: CPT

## 2024-01-26 PROCEDURE — 72125 CT NECK SPINE W/O DYE: CPT | Mod: 26,MH

## 2024-01-26 PROCEDURE — 73030 X-RAY EXAM OF SHOULDER: CPT

## 2024-01-26 PROCEDURE — 72141 MRI NECK SPINE W/O DYE: CPT

## 2024-02-01 ENCOUNTER — APPOINTMENT (OUTPATIENT)
Dept: PEDIATRIC ALLERGY IMMUNOLOGY | Facility: CLINIC | Age: 54
End: 2024-02-01

## 2024-02-01 RX ORDER — LIDOCAINE 5% 700 MG/1
5 PATCH TOPICAL
Qty: 1 | Refills: 3 | Status: ACTIVE | COMMUNITY
Start: 2024-01-10 | End: 1900-01-01

## 2024-02-08 ENCOUNTER — APPOINTMENT (OUTPATIENT)
Dept: ORTHOPEDIC SURGERY | Facility: CLINIC | Age: 54
End: 2024-02-08

## 2024-02-09 ENCOUNTER — APPOINTMENT (OUTPATIENT)
Dept: UROLOGY | Facility: CLINIC | Age: 54
End: 2024-02-09

## 2024-02-09 ENCOUNTER — APPOINTMENT (OUTPATIENT)
Dept: PHYSICAL MEDICINE AND REHAB | Facility: CLINIC | Age: 54
End: 2024-02-09

## 2024-02-12 ENCOUNTER — APPOINTMENT (OUTPATIENT)
Dept: NEUROSURGERY | Facility: CLINIC | Age: 54
End: 2024-02-12
Payer: MEDICARE

## 2024-02-12 VITALS
SYSTOLIC BLOOD PRESSURE: 120 MMHG | OXYGEN SATURATION: 98 % | DIASTOLIC BLOOD PRESSURE: 75 MMHG | BODY MASS INDEX: 26.21 KG/M2 | HEART RATE: 81 BPM | WEIGHT: 130 LBS | HEIGHT: 59 IN

## 2024-02-12 DIAGNOSIS — Z99.3 DEPENDENCE ON WHEELCHAIR: ICD-10-CM

## 2024-02-12 PROCEDURE — 99213 OFFICE O/P EST LOW 20 MIN: CPT

## 2024-02-17 NOTE — ASSESSMENT
[FreeTextEntry1] : Ms. MELANI BROTHERS is a 53 year- old- woman who is presents with cervical Myelomalacia Imaging and diagnostic workup evaluation show evidence of cervical myelomalacia   Plan: No evidence of cord compression of diagnostic workup No surgical interventions recommended. Continued follow up with PM&R Continue PT/ OT  Follow up as needed.  Please see Dr. Simon's dictation for details. I, Dr. Carrie Simon evaluated the patient with the nurse practitioner [place] and established the plan of care. I discussed this patient with the nurse practitioner during the visit. I agree with the assessment and plan as written unless noted below.

## 2024-02-17 NOTE — HISTORY OF PRESENT ILLNESS
[< 3 months] : less than 3 months [FreeTextEntry1] : upper extremity weakness and neck pain [de-identified] : Ms. MELANI BROTHERS is a 53 year-old- woman presenting with a PMHx CP since birth, Wheelchair dependent, IBS, Neurogenic bladder who presents for comprehensive neurosurgical evaluation of cervical spine cervical myelopathy. Today, the pt reports that symptoms started 5- 6 weeks ago with worsening upper extremity weakness and newer onset of weakness in her legs which is worse from her baseline. She receives 12 hours of home care services during the day and none at night. She is concerned about her bilateral upper extremity weakness could cause her to lose her remaining function. She reports bilateral neck pain, R>L. She reports that she is fearful of medication for pain that might interfere with her medication regimen. She comes today to discuss her MRI findings.

## 2024-02-17 NOTE — REASON FOR VISIT
[Follow-Up: _____] : a [unfilled] follow-up visit [FreeTextEntry1] : MELANI BROTHERS  is an 53 year who returns to the office for a follow-up visit and review of the diagnostic workup. Today Ms. BROTHERS  is concerned about the results of her diagnostic workup

## 2024-02-17 NOTE — REVIEW OF SYSTEMS
[As Noted in HPI] : as noted in HPI [Arm Weakness] : arm weakness [Hand Weakness] :  hand weakness [Leg Weakness] : leg weakness [Inability to Walk] : inability to walk [de-identified] : Wheelchair dependent

## 2024-02-17 NOTE — PHYSICAL EXAM
[General Appearance - Alert] : alert [General Appearance - In No Acute Distress] : in no acute distress [Impaired Insight] : insight and judgment were intact [Oriented To Time, Place, And Person] : oriented to person, place, and time [Cranial Nerves Optic (II)] : visual acuity intact bilaterally,  pupils equal round and reactive to light [Cranial Nerves Oculomotor (III)] : extraocular motion intact [Cranial Nerves Trigeminal (V)] : facial sensation intact symmetrically [Cranial Nerves Vestibulocochlear (VIII)] : hearing was intact bilaterally [Cranial Nerves Facial (VII)] : face symmetrical [Cranial Nerves Glossopharyngeal (IX)] : tongue and palate midline [Cranial Nerves Accessory (XI - Cranial And Spinal)] : head turning and shoulder shrug symmetric [Cranial Nerves Hypoglossal (XII)] : there was no tongue deviation with protrusion [Deformity] : deformity [] : no respiratory distress [Heart Rate And Rhythm] : heart rate was normal and rhythm regular [FreeTextEntry1] : CP; Wheelchair dependent [Skin Color & Pigmentation] : normal skin color and pigmentation

## 2024-02-22 ENCOUNTER — NON-APPOINTMENT (OUTPATIENT)
Age: 54
End: 2024-02-22

## 2024-02-22 ENCOUNTER — LABORATORY RESULT (OUTPATIENT)
Age: 54
End: 2024-02-22

## 2024-02-29 ENCOUNTER — LABORATORY RESULT (OUTPATIENT)
Age: 54
End: 2024-02-29

## 2024-03-04 ENCOUNTER — NON-APPOINTMENT (OUTPATIENT)
Age: 54
End: 2024-03-04

## 2024-03-04 ENCOUNTER — LABORATORY RESULT (OUTPATIENT)
Age: 54
End: 2024-03-04

## 2024-03-04 ENCOUNTER — APPOINTMENT (OUTPATIENT)
Dept: NEUROLOGY | Facility: CLINIC | Age: 54
End: 2024-03-04
Payer: MEDICARE

## 2024-03-04 ENCOUNTER — APPOINTMENT (OUTPATIENT)
Dept: HEMATOLOGY ONCOLOGY | Facility: CLINIC | Age: 54
End: 2024-03-04

## 2024-03-04 VITALS
BODY MASS INDEX: 26.21 KG/M2 | HEART RATE: 77 BPM | DIASTOLIC BLOOD PRESSURE: 71 MMHG | OXYGEN SATURATION: 99 % | RESPIRATION RATE: 16 BRPM | WEIGHT: 130 LBS | HEIGHT: 59 IN | SYSTOLIC BLOOD PRESSURE: 119 MMHG

## 2024-03-04 PROCEDURE — 99205 OFFICE O/P NEW HI 60 MIN: CPT

## 2024-03-06 LAB
ENA SS-A AB SER IA-ACNC: <0.2 AL
ENA SS-B AB SER IA-ACNC: <0.2 AL

## 2024-03-07 ENCOUNTER — APPOINTMENT (OUTPATIENT)
Dept: PHYSICAL MEDICINE AND REHAB | Facility: CLINIC | Age: 54
End: 2024-03-07
Payer: MEDICARE

## 2024-03-07 DIAGNOSIS — M25.531 PAIN IN RIGHT WRIST: ICD-10-CM

## 2024-03-07 PROCEDURE — 99213 OFFICE O/P EST LOW 20 MIN: CPT

## 2024-03-07 NOTE — ASSESSMENT
[FreeTextEntry1] : Ms. MELANI BROTHERS is a 53 year F with pain in both shoulders RIGHT > LEFT. She reports chronic long standing pain and is noting an acute on chronic exacerbation of this pain due to glenohumeral degenerative joint disease with subdeltoid bursitis and adhesive capsulitis.  Patient reassured and educated on the diagnosis and treatment options. Risks and benefits of treatment and of delaying treatment discussed with patient. Risks discussed include but not limited to: progression of symptoms, worsening pain and functional status, etc.  This note was generated using Dragon medical dictation software. A reasonable effort had been made for proofreading its contents, but spelling mistakes or grammatical errors may still remain. If there are any questions or points of clarification needed please notify my office.  Spine imaging reviewed NSX notes reviewed Chart reviewed Patient would like to think about injections at this time Continue work up with Neurology Referring to orthopedics for wrist pains Continue follow up with Dr. Nava Follow up PRN  Patient was advised if the following symptoms develop: chills, fever, loss of bladder control, bowel incontinence or urinary retention, numbness/tingling or weakness is present in upper or lower extremities, to go to the nearest emergency room. This may be a new clinical condition not present at the time of the patient visit that may lead to paralysis and/or death. Patient advised if the above symptoms developed to also call the office immediately to inform us and to go to the nearest emergency room.

## 2024-03-07 NOTE — PHYSICAL EXAM
[FreeTextEntry1] : General exam   Constitutional: The patient appears well-developed, well-nourished, and in no apparent distress. Patient is well-groomed.    Eyes: PERRL.    ENMT: Ears: Hearing is grossly within normal limits.    Neck: Supple: The neck is supple.    Respiratory: Inspection: Breathing unlabored.    Neurologic: Alert and oriented x 3.

## 2024-03-07 NOTE — HISTORY OF PRESENT ILLNESS
[FreeTextEntry1] : MELANI BROTHERS is here for follow up. Since last visit she had been seen by NSX and even though there is cervical myelomalacia, there is no surgical intervention indicated at this time. She is currently being worked up by Neurology for any brain related cause of her issues. She presents today with continued RIGHT shoulder pain and limitation in ROM and function. She is also noting pain in the right wrist.   Denies any new pain, numbness or weakness, bowel/bladder dysfunction, saddle anesthesia, fevers, chills, weight loss, night pain, or night sweats at this time.

## 2024-03-08 NOTE — DATA REVIEWED
[de-identified] : Serial MRI C spine from 1/24, 4/23 and 2013 reviewed and d/w neuroradiology, no change to focal cord atrophy in C spine at C6, no other cord lesions, brain MRI 2014 reviewed no lesions noted

## 2024-03-08 NOTE — PHYSICAL EXAM
[FreeTextEntry1] :  Exam as below (with documented exceptions in parentheses):  General:  Healthy appearing woman in wheelchair  Mental Status:  Awake, alert and attentive. Oriented to person, place and time. Recent and remote memory intact. Normal concentration. Fluent spontaneous speech with intact naming and repetition. Normal fund of knowledge.    Cranial Nerves: II: Full visual fields. III,IV,VI:Pupils round, reactive to light. Full extraocular movements. No nystagmus. V: Normal bilateral bite, facial sensation. VII: No facial weakness. VIII: Hearing intact. IX,X: Palate midline, intact gag. XI:  Sternocleidomastoids normal. XII: Tongue protrudes midline. No dysarthria.  (no APD)  Motor:  Normal tone, bulk and power throughout including arms and legs, proximal and distal. No pronator drift. No abnormal movements.  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 Negative Romberg.  (no clear pp level, but drop off mid cervical level; JPS intact)  Coordination: No dysmetria or dysdiadochokinesis bilaterally. Normal heel-shin testing.   Gait: Normal including heel and toe walking. Normal station. (unable)  Reflexes: Normoactive and symmetric throughout. Absent Babinski bilaterally.  (brisk throughout)   Vascular: No peripheral edema/calf tenderness.   Eyes: Funduscopic exam without papilledema  Heart: Regular rate and rhythm. Normal s1-s2. No murmurs, rubs or gallops.   Respiratory: Lungs clear to auscultation bilaterally.   Abdomen: Nontender, non-distended. No hepatosplenomegaly. Normal bowel sounds in four quadrants.

## 2024-03-08 NOTE — HISTORY OF PRESENT ILLNESS
[FreeTextEntry1] : NEURO-ONCOLOGY  This 53 year old right handed woman is referred by Dr. Anand Simon for my advice and opinion regarding myelopathy   She carries a diagnosis of cerebral palsy since childhood.  She reports no known birth complications, but walking was delayed and did toe walk.  Due to spasticity she's had multiple orthopedic surgeries.  of note she feels her neurologic function has gradually declined over time, with loss of ability to walk in her 30's, and neurogenic bladder as well.  However she developed rather abrupt worsening of pain in her arms, right more than left, numbness in lower body, and further sphincteric dysfunction in 12/23.  There was no clear precipitant.  Has never had vision issues.  She's had numerous MRIs, stable since 2013, showing focal cord atrophy and myelomalacia in the lower C cord, without T spine or brain MRI abnl. She's never had an LP. Because of cord lesion she saw Dr. Simon, who referred her here.  Recent MRI C spine without change.  There is no radiographic evidence of tethered cord.  She is hesitant to take pain meds for fear of worsening bladder function.   PMH/PSH: as above SHX:  disabled.  nonsmoker. nondrinker FHX: no known neuro d/o but for encephalitis in aunt.

## 2024-03-08 NOTE — DISCUSSION/SUMMARY
[FreeTextEntry1] : Progressive myelopathy, slowly over time with recent worsening.  Imaging and history not entirely suggestive of neuro-inflammatory d/o. I do not think LP will be contributory, unless MRI brain shows a change.  She is adamant about no gado for now.   MRI brain without gado to eval for change Serologies sent - vit D, b12/mma, pnp, copper, HTLV, RF, ANCA, ro/la, mog/nmo May refer to neuroimmunology, but course seems more consistent with hereditary myelopathy RTC 4w
,

## 2024-03-11 ENCOUNTER — OUTPATIENT (OUTPATIENT)
Dept: OUTPATIENT SERVICES | Facility: HOSPITAL | Age: 54
LOS: 1 days | End: 2024-03-11
Payer: MEDICARE

## 2024-03-11 ENCOUNTER — APPOINTMENT (OUTPATIENT)
Dept: MRI IMAGING | Facility: IMAGING CENTER | Age: 54
End: 2024-03-11
Payer: MEDICARE

## 2024-03-11 ENCOUNTER — APPOINTMENT (OUTPATIENT)
Dept: NEUROLOGY | Facility: CLINIC | Age: 54
End: 2024-03-11
Payer: MEDICARE

## 2024-03-11 VITALS
OXYGEN SATURATION: 98 % | RESPIRATION RATE: 16 BRPM | SYSTOLIC BLOOD PRESSURE: 106 MMHG | WEIGHT: 130 LBS | HEART RATE: 75 BPM | BODY MASS INDEX: 26.21 KG/M2 | HEIGHT: 59 IN | DIASTOLIC BLOOD PRESSURE: 71 MMHG

## 2024-03-11 DIAGNOSIS — G80.9 CEREBRAL PALSY, UNSPECIFIED: ICD-10-CM

## 2024-03-11 DIAGNOSIS — G95.89 OTHER SPECIFIED DISEASES OF SPINAL CORD: ICD-10-CM

## 2024-03-11 DIAGNOSIS — M62.81 MUSCLE WEAKNESS (GENERALIZED): ICD-10-CM

## 2024-03-11 DIAGNOSIS — Z98.890 OTHER SPECIFIED POSTPROCEDURAL STATES: Chronic | ICD-10-CM

## 2024-03-11 DIAGNOSIS — R26.9 MUSCLE WEAKNESS (GENERALIZED): ICD-10-CM

## 2024-03-11 PROCEDURE — 70551 MRI BRAIN STEM W/O DYE: CPT | Mod: 26,MH

## 2024-03-11 PROCEDURE — 70551 MRI BRAIN STEM W/O DYE: CPT

## 2024-03-11 PROCEDURE — 99213 OFFICE O/P EST LOW 20 MIN: CPT

## 2024-03-14 PROBLEM — G95.89 CERVICAL CORD MYELOMALACIA: Status: ACTIVE | Noted: 2024-02-12

## 2024-03-14 PROBLEM — M62.81 ABNORMAL GAIT DUE TO MUSCLE WEAKNESS: Status: ACTIVE | Noted: 2023-08-08

## 2024-03-14 NOTE — HISTORY OF PRESENT ILLNESS
[FreeTextEntry1] : NEURO-ONCOLOGY  This 53 year old right handed woman is seen in follow up regarding myelopathy   She carries a diagnosis of cerebral palsy since childhood.  She reports no known birth complications, but walking was delayed and did toe walk.  Due to spasticity she's had multiple orthopedic surgeries.  of note she feels her neurologic function has gradually declined over time, with loss of ability to walk in her 30's, and neurogenic bladder as well.  However she developed rather abrupt worsening of pain in her arms, right more than left, numbness in lower body, and further sphincteric dysfunction in 12/23.  There was no clear precipitant.  Has never had vision issues.  She's had numerous MRIs, stable since 2013, showing focal cord atrophy and myelomalacia in the lower C cord, without T spine or brain MRI abnl. She's never had an LP. Because of cord lesion she saw Dr. Simon, who referred her here.  Recent MRI C spine without change.  There is no radiographic evidence of tethered cord.  She is hesitant to take pain meds for fear of worsening bladder function.   INTERVAL HISTORY: returns after MRI brain which showed no abnl T2 signal.  No gado given.   Clinically stable.  Her blood work has resulted negative - MOG/NMO/HTLV/RPR/B12/Copper/ANCA/STEF/RF/Sjogrens/VitD/MMA:   PMH/PSH: as above SHX:  disabled.  nonsmoker. nondrinker FHX: no known neuro d/o but for encephalitis in aunt.

## 2024-03-14 NOTE — DATA REVIEWED
[de-identified] : Serial MRI C spine from 1/24, 4/23 and 2013 reviewed and d/w neuroradiology, no change to focal cord atrophy in C spine at C6, no other cord lesions, brain MRI 2014 reviewed no lesions noted   Brain MRI 3/24 reviewed personally (no gado) without lesion noted in white matter

## 2024-03-14 NOTE — DISCUSSION/SUMMARY
[FreeTextEntry1] : Progressive myelopathy, slowly over time with recent worsening.  Imaging and history not suggestive of neuro-inflammatory d/o. I do not think LP will be contributory, and she is not interested at present.  I discussed her case with Dr. Simon who will pursue stand up MRI to see if there is dynamic cord stretch with position.   Await MRI as above May refer to neuroimmunology, but course seems more consistent with hereditary myelopathy RTC 3w via TEB

## 2024-03-21 ENCOUNTER — OUTPATIENT (OUTPATIENT)
Dept: OUTPATIENT SERVICES | Facility: HOSPITAL | Age: 54
LOS: 1 days | End: 2024-03-21
Payer: MEDICARE

## 2024-03-21 ENCOUNTER — LABORATORY RESULT (OUTPATIENT)
Age: 54
End: 2024-03-21

## 2024-03-21 ENCOUNTER — APPOINTMENT (OUTPATIENT)
Dept: INTERNAL MEDICINE | Facility: CLINIC | Age: 54
End: 2024-03-21
Payer: MEDICARE

## 2024-03-21 VITALS
WEIGHT: 130 LBS | HEIGHT: 59 IN | BODY MASS INDEX: 26.21 KG/M2 | HEART RATE: 89 BPM | SYSTOLIC BLOOD PRESSURE: 108 MMHG | OXYGEN SATURATION: 98 % | DIASTOLIC BLOOD PRESSURE: 70 MMHG

## 2024-03-21 DIAGNOSIS — J06.9 ACUTE UPPER RESPIRATORY INFECTION, UNSPECIFIED: ICD-10-CM

## 2024-03-21 DIAGNOSIS — R30.0 DYSURIA: ICD-10-CM

## 2024-03-21 DIAGNOSIS — I10 ESSENTIAL (PRIMARY) HYPERTENSION: ICD-10-CM

## 2024-03-21 DIAGNOSIS — Z98.890 OTHER SPECIFIED POSTPROCEDURAL STATES: Chronic | ICD-10-CM

## 2024-03-21 PROCEDURE — G0463: CPT

## 2024-03-21 PROCEDURE — 87077 CULTURE AEROBIC IDENTIFY: CPT

## 2024-03-21 PROCEDURE — 87186 SC STD MICRODIL/AGAR DIL: CPT

## 2024-03-21 PROCEDURE — 99214 OFFICE O/P EST MOD 30 MIN: CPT | Mod: GC

## 2024-03-21 PROCEDURE — 87086 URINE CULTURE/COLONY COUNT: CPT

## 2024-03-21 PROCEDURE — 81001 URINALYSIS AUTO W/SCOPE: CPT

## 2024-03-21 RX ORDER — NYSTATIN 100000 U/G
100000 OINTMENT TOPICAL 3 TIMES DAILY
Qty: 1 | Refills: 5 | Status: DISCONTINUED | COMMUNITY
Start: 2017-09-26 | End: 2024-03-21

## 2024-03-21 RX ORDER — FLUOCINOLONE ACETONIDE 0.1 MG/G
0.01 CREAM TOPICAL 3 TIMES DAILY
Qty: 1 | Refills: 0 | Status: DISCONTINUED | COMMUNITY
Start: 2022-09-01 | End: 2024-03-21

## 2024-03-21 RX ORDER — OMADACYCLINE 150 MG/1
150 TABLET, FILM COATED ORAL DAILY
Qty: 20 | Refills: 0 | Status: DISCONTINUED | COMMUNITY
Start: 2023-10-18 | End: 2024-03-21

## 2024-03-21 RX ORDER — TERCONAZOLE 80 MG/1
80 SUPPOSITORY VAGINAL
Qty: 3 | Refills: 3 | Status: DISCONTINUED | COMMUNITY
Start: 2018-06-28 | End: 2024-03-21

## 2024-03-22 ENCOUNTER — NON-APPOINTMENT (OUTPATIENT)
Age: 54
End: 2024-03-22

## 2024-03-22 ENCOUNTER — APPOINTMENT (OUTPATIENT)
Dept: PHYSICAL MEDICINE AND REHAB | Facility: CLINIC | Age: 54
End: 2024-03-22
Payer: MEDICARE

## 2024-03-22 VITALS — DIASTOLIC BLOOD PRESSURE: 74 MMHG | HEART RATE: 80 BPM | SYSTOLIC BLOOD PRESSURE: 109 MMHG | OXYGEN SATURATION: 95 %

## 2024-03-22 DIAGNOSIS — Q76.0 SPINA BIFIDA OCCULTA: ICD-10-CM

## 2024-03-22 LAB
APPEARANCE: CLEAR
BILIRUBIN URINE: NEGATIVE
BLOOD URINE: NEGATIVE
COLOR: YELLOW
GLUCOSE QUALITATIVE U: NEGATIVE MG/DL
KETONES URINE: NEGATIVE MG/DL
LEUKOCYTE ESTERASE URINE: NEGATIVE
NITRITE URINE: POSITIVE
PH URINE: 6
PROTEIN URINE: NEGATIVE MG/DL
SPECIFIC GRAVITY URINE: 1.01
UROBILINOGEN URINE: 0.2 MG/DL

## 2024-03-22 PROCEDURE — 99214 OFFICE O/P EST MOD 30 MIN: CPT

## 2024-03-22 NOTE — ASSESSMENT
[FreeTextEntry1] : - Refer to Dr. Gibson for possible medical marijuana as has side effects from other meds for neuropathic pain and spasticity. - discussed possibility of botox but we are concerned can decrease functionality. - f/u w Dr. Haywood for possibility of R shoulder injection. - reviewed MRI with patient  - did paperwork for able ride.

## 2024-03-22 NOTE — HISTORY OF PRESENT ILLNESS
[FreeTextEntry1] : Patient got new wheelchair and very happy. Had URI two weeks ago and recovered.  Needs paperwork done for [employer].  Still w significant pain, numbness; loss of function - getting work up via neurosx- noted spinal cord myelomalacia and no plan for surgery.   MRI of Cervical Spine 1. Multilevel degenerative disc disease is again seen that is similar to the prior given differences in technique. 2. Myelomalacia and moderate to severe volume loss of the cord is again seen at C5 and C6. 3. C5-C6 demonstrates moderate to severe spinal canal stenosis. 4. C3-C4, C4-C5, and C6-C7 demonstrate moderate spinal canal stenosis. 5. There is variable neural foraminal stenosis.  MRI of Brain negative.  Numbness primarily not painful. Gets pain of the R shoulder to the R hand  Gabapentin/oral meds cause weakness/fatigue Tries supplement

## 2024-03-22 NOTE — PHYSICAL EXAM
[Normal] : Oriented to person, place, and time, insight and judgement were intact and the affect was normal [de-identified] : no distress [de-identified] : well perfused, trace edema [de-identified] : soft [de-identified] : tone diffuse [de-identified] : MAS 2-3 bl UE; 3/5 bl UE (limited due to tone); AAO x 3

## 2024-03-25 ENCOUNTER — APPOINTMENT (OUTPATIENT)
Dept: NEUROLOGY | Facility: CLINIC | Age: 54
End: 2024-03-25

## 2024-03-25 PROBLEM — J06.9 VIRAL URI WITH COUGH: Status: ACTIVE | Noted: 2024-03-25

## 2024-03-25 NOTE — HISTORY OF PRESENT ILLNESS
[FreeTextEntry1] : Follow up  [de-identified] : 53 year old with CP/cervical myelopathy, irritable bowel disease, neurogenic bladder, recurrent UTS presenting for follow up.   She reported viral symptoms that started on Sunday. Denied fever, but had scatchy and sore thorat with dry cough, congestion and body ache. Symptoms has since improved. Three days ago, she started experiencing burning and pain with urination but denied any back pain or fever at home. She wants to have her urine tested.   She saw NSGY and neurology for her progressive arm weakness. She has known cervical  myelomalacia and cervical stenosis. Work up still ongoing.

## 2024-03-25 NOTE — PHYSICAL EXAM
[No Acute Distress] : no acute distress [Normal Sclera/Conjunctiva] : normal sclera/conjunctiva [No Respiratory Distress] : no respiratory distress  [PERRL] : pupils equal round and reactive to light [Clear to Auscultation] : lungs were clear to auscultation bilaterally [Normal Rate] : normal rate  [Regular Rhythm] : with a regular rhythm [Soft] : abdomen soft [Normal S1, S2] : normal S1 and S2 [Non Tender] : non-tender [Non-distended] : non-distended [No Spinal Tenderness] : no spinal tenderness [No CVA Tenderness] : no CVA  tenderness [Alert and Oriented x3] : oriented to person, place, and time

## 2024-03-27 ENCOUNTER — APPOINTMENT (OUTPATIENT)
Dept: PEDIATRIC ALLERGY IMMUNOLOGY | Facility: CLINIC | Age: 54
End: 2024-03-27

## 2024-03-27 ENCOUNTER — APPOINTMENT (OUTPATIENT)
Dept: INFECTIOUS DISEASE | Facility: CLINIC | Age: 54
End: 2024-03-27
Payer: MEDICARE

## 2024-03-27 DIAGNOSIS — N39.0 URINARY TRACT INFECTION, SITE NOT SPECIFIED: ICD-10-CM

## 2024-03-27 PROCEDURE — 99441: CPT | Mod: 93

## 2024-03-27 NOTE — HISTORY OF PRESENT ILLNESS
[Home] : at home, [unfilled] , at the time of the visit. [Medical Office: ([facility])___] : at the medical office located in  [Verbal consent obtained from patient] : the patient, [unfilled] [FreeTextEntry1] : Patient made appointment to discuss urine results obtained by PCP (see below) Patient reports mild intermittent dysuria, no consistent symptoms. Occasional AM foul smell of urine.  No fever, chills.

## 2024-03-27 NOTE — ASSESSMENT
[FreeTextEntry1] : Advised patient that urine findings - in absence of consistent UTI symptoms - reflect asymptomatic bacteriuria, especially in setting of no pyuria on UA. Advised patient that if symptoms progress or become more consistent we can discuss treatment (patient has omadacycline that was prescribed but never taken), but given current intermittent symptoms and no pyuria, treatment is not recommended.

## 2024-03-28 ENCOUNTER — APPOINTMENT (OUTPATIENT)
Dept: ORTHOPEDIC SURGERY | Facility: CLINIC | Age: 54
End: 2024-03-28

## 2024-03-28 ENCOUNTER — NON-APPOINTMENT (OUTPATIENT)
Age: 54
End: 2024-03-28

## 2024-03-29 ENCOUNTER — APPOINTMENT (OUTPATIENT)
Dept: MRI IMAGING | Facility: IMAGING CENTER | Age: 54
End: 2024-03-29

## 2024-03-31 LAB
C TRACH RRNA SPEC QL NAA+PROBE: NOT DETECTED
CANDIDA VAG CYTO: NOT DETECTED
CYTOLOGY CVX/VAG DOC THIN PREP: NORMAL
G VAGINALIS+PREV SP MTYP VAG QL MICRO: NOT DETECTED
HPV HIGH+LOW RISK DNA PNL CVX: NOT DETECTED
N GONORRHOEA RRNA SPEC QL NAA+PROBE: NOT DETECTED
SOURCE TP AMPLIFICATION: NORMAL
T VAGINALIS VAG QL WET PREP: NOT DETECTED

## 2024-04-03 ENCOUNTER — APPOINTMENT (OUTPATIENT)
Dept: NEUROLOGY | Facility: CLINIC | Age: 54
End: 2024-04-03
Payer: MEDICARE

## 2024-04-03 DIAGNOSIS — G95.9 DISEASE OF SPINAL CORD, UNSPECIFIED: ICD-10-CM

## 2024-04-03 DIAGNOSIS — G80.9 CEREBRAL PALSY, UNSPECIFIED: ICD-10-CM

## 2024-04-03 DIAGNOSIS — M48.02 SPINAL STENOSIS, CERVICAL REGION: ICD-10-CM

## 2024-04-03 PROCEDURE — 99214 OFFICE O/P EST MOD 30 MIN: CPT

## 2024-04-05 PROBLEM — G95.9 CERVICAL MYELOPATHY: Status: ACTIVE | Noted: 2019-09-13

## 2024-04-05 NOTE — DATA REVIEWED
[de-identified] : Serial MRI C spine from 1/24, 4/23 and 2013 reviewed and d/w neuroradiology, no change to focal cord atrophy in C spine at C6, no other cord lesions, brain MRI 2014 reviewed no lesions noted   Brain MRI 3/24 reviewed personally (no gado) without lesion noted in white matter

## 2024-04-05 NOTE — PHYSICAL EXAM
[FreeTextEntry1] : Limited by TEB  Seated in wheelchair, no distress She is awake and alert and able to provide history without issue not apahsic CN with normal EOM, face symmetry, tongue movement Motor she is able to lift arms antigravity without drift unable to ambulate

## 2024-04-05 NOTE — HISTORY OF PRESENT ILLNESS
[FreeTextEntry1] : NEURO-ONCOLOGY  This 53 year old right handed woman is seen in follow up regarding myelopathy   She carries a diagnosis of cerebral palsy since childhood.  She reports no known birth complications, but walking was delayed and did toe walk.  Due to spasticity she's had multiple orthopedic surgeries.  of note she feels her neurologic function has gradually declined over time, with loss of ability to walk in her 30's, and neurogenic bladder as well.  However she developed rather abrupt worsening of pain in her arms, right more than left, numbness in lower body, and further sphincteric dysfunction in 12/23.  There was no clear precipitant.  Has never had vision issues.  She's had numerous MRIs, stable since 2013, showing focal cord atrophy and myelomalacia in the lower C cord, without T spine or brain MRI abnl. She's never had an LP. Because of cord lesion she saw Dr. Simon, who referred her here.  Recent MRI C spine without change.  There is no radiographic evidence of tethered cord.  She is hesitant to take pain meds for fear of worsening bladder function.  MRI brain in 2/24 without abnl signal.  MOG/NMO/HTLV/RPR/B12/Copper/ANCA/STEF/RF/Sjogrens/VitD/MMA negative:   INTERVAL HISTORY:  Clinically stable.   Reports she did get a flu shot 10/30/23 prior to symptom onset.  Notably none of her scans had gado.  She has persistent pain in her hands and arms.  No worsened weakness or sphincter issues.    PMH/PSH: as above SHX:  disabled.  nonsmoker. nondrinker FHX: no known neuro d/o but for encephalitis in aunt.

## 2024-04-05 NOTE — DISCUSSION/SUMMARY
[FreeTextEntry1] : Progressive myelopathy, slowly over time with recent worsening.  Imaging and history not suggestive of neuro-inflammatory d/o. I do not think LP will be contributory, and she is not interested at present.  I discussed her case with Dr. Simon who will pursue stand up MRI to see if there is dynamic cord stretch with position.  I do think she should have gado enhanced MRI as she reports a history of vaccine prior to symptoms, may have TM related to vaccine.   Await MRI as above May refer to neuroimmunology, but course seems more consistent with hereditary myelopathy RTC 2mo

## 2024-04-09 LAB
24R-OH-CALCIDIOL SERPL-MCNC: 79.8 PG/ML
25(OH)D3 SERPL-MCNC: 78.5 NG/ML
ANA SER IF-ACNC: NEGATIVE
AQP4 H2O CHANNEL AB SERPL IA-ACNC: NEGATIVE
COPPER SERPL-MCNC: 120 UG/DL
HTLV I+II AB SER QL: NORMAL
METHYLMALONATE SERPL-SCNC: 113 NMOL/L
MOG AB SER QL CBA IFA: NEGATIVE
RHEUMATOID FACT SER QL: <10 IU/ML
SSA-52 (RO52) (ENA) ANTIBODY, IGG: 1 AU/ML
SSA-60 (RO60) (ENA) ANTIBODY, IGG: 1 AU/ML
T PALLIDUM AB SER QL IA: NEGATIVE
VIT B12 SERPL-MCNC: >2000 PG/ML

## 2024-04-12 ENCOUNTER — APPOINTMENT (OUTPATIENT)
Dept: OTOLARYNGOLOGY | Facility: CLINIC | Age: 54
End: 2024-04-12
Payer: MEDICARE

## 2024-04-12 ENCOUNTER — APPOINTMENT (OUTPATIENT)
Dept: UROLOGY | Facility: CLINIC | Age: 54
End: 2024-04-12

## 2024-04-12 DIAGNOSIS — H61.23 IMPACTED CERUMEN, BILATERAL: ICD-10-CM

## 2024-04-12 DIAGNOSIS — H93.8X3 OTHER SPECIFIED DISORDERS OF EAR, BILATERAL: ICD-10-CM

## 2024-04-12 PROCEDURE — 69210 REMOVE IMPACTED EAR WAX UNI: CPT

## 2024-04-12 PROCEDURE — 99213 OFFICE O/P EST LOW 20 MIN: CPT | Mod: 25

## 2024-04-12 NOTE — ASSESSMENT
[FreeTextEntry1] : Ear fullness and muffled herring form cerumen impactions: - Cerumen removed in office - Use debrox when able. - F/U 4 months, sooner as needed.

## 2024-04-12 NOTE — END OF VISIT
[FreeTextEntry3] : I personally saw and examined MELANI BROTHERS in detail. I spoke to FRANCESCA Le regarding the assessment and plan of care. I reviewed the above assessment and plan of care and agree. I have made changes in changes in the body of the note where appropriate. I personally reviewed the HPI, PMH, FH, SH, ROS and medications/allergies. I have spoken to FRANCESCA Le regarding the history and have personally determined the assessment and plan of care and documented this myself. I was present and participated in all key portions of the encounter and all procedures noted above. I performed all procedures and performed the physical exam. I have made changes in the body of the note where appropriate.   Attesting Faculty: See Attending Signature Below

## 2024-04-12 NOTE — PROCEDURE
[FreeTextEntry3] : Procedure - Cerumen Removal.  After informed verbal consent is obtained, cerumen is removed from the b/l ear canal with a curette and suction.  Normal appearing canal following removal.

## 2024-04-23 ENCOUNTER — TRANSCRIPTION ENCOUNTER (OUTPATIENT)
Age: 54
End: 2024-04-23

## 2024-05-10 ENCOUNTER — APPOINTMENT (OUTPATIENT)
Dept: UROLOGY | Facility: CLINIC | Age: 54
End: 2024-05-10

## 2024-05-31 ENCOUNTER — APPOINTMENT (OUTPATIENT)
Dept: NEUROLOGY | Facility: CLINIC | Age: 54
End: 2024-05-31

## 2024-05-31 ENCOUNTER — APPOINTMENT (OUTPATIENT)
Dept: SURGICAL ONCOLOGY | Facility: CLINIC | Age: 54
End: 2024-05-31
Payer: MEDICARE

## 2024-05-31 VITALS
OXYGEN SATURATION: 98 % | HEIGHT: 59 IN | DIASTOLIC BLOOD PRESSURE: 71 MMHG | SYSTOLIC BLOOD PRESSURE: 108 MMHG | HEART RATE: 75 BPM | WEIGHT: 130 LBS | RESPIRATION RATE: 17 BRPM | BODY MASS INDEX: 26.21 KG/M2

## 2024-05-31 DIAGNOSIS — N63.0 UNSPECIFIED LUMP IN UNSPECIFIED BREAST: ICD-10-CM

## 2024-05-31 PROCEDURE — 99214 OFFICE O/P EST MOD 30 MIN: CPT

## 2024-06-03 DIAGNOSIS — M25.511 PAIN IN RIGHT SHOULDER: ICD-10-CM

## 2024-06-03 NOTE — PHYSICAL EXAM
[Normal] : supple, no neck mass and thyroid not enlarged [Normal Neck Lymph Nodes] : normal neck lymph nodes  [Normal Supraclavicular Lymph Nodes] : normal supraclavicular lymph nodes [Normal Groin Lymph Nodes] : normal groin lymph nodes [Normal Axillary Lymph Nodes] : normal axillary lymph nodes [Normal] : oriented to person, place and time, with appropriate affect [de-identified] : no masses or adenopathy bilaterally

## 2024-06-03 NOTE — ADDENDUM
[FreeTextEntry1] : I, Sasha De Los Santos, acted solely as a scribe for Dr. Nav Garza on this date 05/31/2024.

## 2024-06-03 NOTE — ASSESSMENT
[FreeTextEntry1] : S/p benign & concordant right breast bx 12/2023 Will defer 6 month f/u diagnostic mmg due to pts. neuro issues and will proceed with yearly imaging 11/2024  Recommend vitamin e and/or primrose prn for breast pain  RTO?

## 2024-06-03 NOTE — HISTORY OF PRESENT ILLNESS
[de-identified] : 53 y/o female presents for a follow-up visit. Pt has cervical myelopathy, reports having numbness over her extremities in 12/2023, found to have myelomalacia, she is unable to have a stand-up MRI due to her disability.   Bx 12/15/23- 1. Breast, right, 11:00, 2 cm FN, core biopsy - Nonproliferative fibrocystic changes including ductal dilatation and stromal fibrosis with pseudoangiomatous stromal hyperplasia (PASH) - Microcalcifications identified within breast tissue Results are BENIGN AND CONCORDANT.   B/L mammo/sono 12/7/23- 1. Extremely dense breasts. 2. Oval nodular mass in the upper central right breast by mammography, likely corresponding to a newly sonographically seen heterogeneous but predominantly hypoechoic mass with poorly defined margins at 11:00 on sonography. Ultrasound-guided core biopsy is recommended, followed by clip placement. 3. No radiographic evidence of malignancy and no significant radiographic change on the left. 4. No significant change in the previous probably benign finding by sonography at the 3:00 left breast since 11/10/2022. Follow-up targeted left breast sonography in one year is recommended to confirm its continued stability, and establish at least 2 years of stability. 5. The previous sonographic findings at 10:00 (6 cm from the nipple) on the right and 12:00 (1 cm from the nipple) on the left are no longer seen. (BIRADS 4B)  Bilateral mammo/sono performed on 11/10/22 showed extremely dense breasts. No radiographic evidence of malignancy and no significant radiographic change. Newly seen probably benign hypoechoic nodules by sonography at the 10:00 (6 cm from the nipple) right breast and 12:00 (1 cm from the nipple) and 3:00 (5 cm from the nipple) left breast for which follow-up bilateral targeted sonography in 6 months is recommended, to confirm their stability. (BI-RADS 3)  Bilateral mammo/sono performed on 10/29/21 showed no mammographic or sonographic evidence of malignancy. (BI-RADS 2)  MMG/US 8/14/20: BI-RADS 2 Extremely dense breasts. No mammographic evidence of malignancy. No significant change. No sonographic evidence of malignancy.  US 2/5/20: BI-RADS 3 At 7:00, 8 cm from the nipple, there is a 0.5 x 0.2 x 0.6 cm circumscribed hypoechoic mass, stable since 05/14/2019.  At 3:00, 3 cm from the nipple, there is a 0.8 x 0.2 x 3.5 cm probable cyst versus hypoechoic mass, stable since 05/14/2019.  At 3:00, 4 cm from the nipple, there is a 0.4 x 0.2 x 0.3 cm circumscribed hypoechoic mass, stable since 05/14/2019. Stable bilateral circumscribed hypoechoic masses, probably benign, for which six-month ultrasound follow-up in May 2020 at time of annual mammogram is recommended.  Bilateral MMG/US 5/14/19: Multiple bilateral cysts and nodules bilaterally. Right breast 12:00 4 cm fn: newly noted 0.5 cm circumscribed hypoechoic mass. Newly noted circumscribed hypoechoic masses in left breast at 3:00 4 cm fn, 3:00 3 cm fn, and 7:00 8 cm fn. No evidence of malignancy. BIRADS-3. Mammogram/sonogram recommended in one year.   History multiple bilateral benign US guided needle biopsies (fibrocystic changes and fibroadenomas)  Pt notes she got extremely sick in November, diagnosed with HPMV.  Pt has CP and presents in a wheelchair.  Denies personal or family history of breast or ovarian cancer.

## 2024-06-06 ENCOUNTER — APPOINTMENT (OUTPATIENT)
Dept: UROLOGY | Facility: CLINIC | Age: 54
End: 2024-06-06

## 2024-06-07 ENCOUNTER — APPOINTMENT (OUTPATIENT)
Dept: ENDOCRINOLOGY | Facility: CLINIC | Age: 54
End: 2024-06-07
Payer: MEDICARE

## 2024-06-07 VITALS
HEIGHT: 59 IN | OXYGEN SATURATION: 98 % | HEART RATE: 74 BPM | BODY MASS INDEX: 26.21 KG/M2 | WEIGHT: 130 LBS | DIASTOLIC BLOOD PRESSURE: 72 MMHG | SYSTOLIC BLOOD PRESSURE: 106 MMHG

## 2024-06-07 DIAGNOSIS — E78.5 HYPERLIPIDEMIA, UNSPECIFIED: ICD-10-CM

## 2024-06-07 DIAGNOSIS — E03.9 HYPOTHYROIDISM, UNSPECIFIED: ICD-10-CM

## 2024-06-07 DIAGNOSIS — D64.9 ANEMIA, UNSPECIFIED: ICD-10-CM

## 2024-06-07 DIAGNOSIS — M85.80 OTHER SPECIFIED DISORDERS OF BONE DENSITY AND STRUCTURE, UNSPECIFIED SITE: ICD-10-CM

## 2024-06-07 DIAGNOSIS — E22.1 HYPERPROLACTINEMIA: ICD-10-CM

## 2024-06-07 DIAGNOSIS — E55.9 VITAMIN D DEFICIENCY, UNSPECIFIED: ICD-10-CM

## 2024-06-07 PROCEDURE — 99214 OFFICE O/P EST MOD 30 MIN: CPT

## 2024-06-07 PROCEDURE — G2211 COMPLEX E/M VISIT ADD ON: CPT

## 2024-06-08 NOTE — ASSESSMENT
[FreeTextEntry1] : 54 y.o. female with h/o hyperprolactinemia, subclinical hypothyroidism, vitamin D def, hyperlipidemia and osteopenia  1. Hyperprolactinemia- Suspect pituitary microadenoma. Since getting menses and no galactorrhea, have been monitoring given medication sensitivity. If prolactin level is stable, will continue to monitor. Now with lighter/spotty menses, may be related to COVID-19, possible perimenopause, or hyperprolactinemia. Will check serum prolactin and sex hormones.  2. Subclinical hypothyroidism- Will check TFTs and if stable, will continue to monitor.  3. Vitamin D def- Will check 25 vitamin D level and will supplement accordingly.  4. Anemia- Will check CBC and iron studies.  5. Hyperlipidemia and overweight- Encouraged a low-fat diet, carbohydrate consistent diet. Will check lipids and Hba1c.  6. Osteopenia- DEXA scan performed in 11/2022 shows spine -1.1 with 2.1% decrease, right femoral neck -0.6 and total hip -0.6 which is stable, and 1/3 radius is 0.5. Risk of fracture is average. Recommend monitoring for now and will repeat DEXA scan in 2024. Encouraged weight bearing activity as tolerated. Discussed appropriate calcium and vitamin D intake. Will check 25 vitamin D level.    If stable, follow up in 6 months.

## 2024-06-08 NOTE — HISTORY OF PRESENT ILLNESS
[FreeTextEntry1] : 54 y.o. female with h/o CP, hyperprolactinemia and subclinical hypothyroidism here for follow up visit.   She did have right breast biopsy on 12/15/23 which was benign.   Reports B/L UE pain and weakness which began in December 2023 shortly after getting her flu vaccine. Seeing neurology and diagnosed with progressive myelopathy.   She had MRI of brain on 3/11/24 which showed nonspecific mild white matter changes. No acute intracranial hemorrhage or acute infarct.  She did get both doses of COVID-19 vaccine (Pfizer) and 2 boosters. She did get COVID-19 on 12/24/22 with moderate symptoms. She reports h/o numbness on left arm. She did not see neurology but was following with PMR. She had viral syndrome in May 2021 and went to the hospital.   Reports irregular menses and follows with GYN. Reports h/o heavy menstrual cycle. Does have fibroids. Notes periods irregular since COVID booster in Jan 2022, shorter and getting spotting instead. C/o dry skin. Had avulsion fracture in the past. Brain MRI in June 2014 showed no obvious pituitary lesion but was noncontrast. Does get frontal headaches but not severe. No vision changes. No galactorrhea. C/o hair loss which is stable. Has been taking iron 27 mg daily (less than before). Dealing with chronic UTIs and BV/yeast infection. Follows with ID. Does have daily urine catheterization. Saw cardiology for palpitations, reports now improved. H/o IBS.  In regard to thyroid disease (subclinical hypothyroidism), did not tolerate treatment with T4 in the past. C/o fatigue. No neck complaints. Reports weight gain. Eating less bagels but too much cheese. No PT now.   Regarding bone health, wheelchair bound therefore unable to perform weight bearing activity. She has been holding vitamin D3 5,000 IU daily. Eats leafy greens and cheese. No recent falls or fractures.  DEXA scan in November 2017 shows spine -0.9 (arthritic changes) , left femoral neck -0.5, total hip -0.6 and 1/3 radius 0.2.  DEXA scan in November 2022 shows spine -1.1 with 2.1% decrease, right femoral neck -0.6 and total hip -0.6 which is stable and 1/3 radius is 0.5.

## 2024-06-08 NOTE — PHYSICAL EXAM
[Alert] : alert [No Acute Distress] : no acute distress [Normal Sclera/Conjunctiva] : normal sclera/conjunctiva [EOMI] : extra ocular movement intact [No LAD] : no lymphadenopathy [Thyroid Not Enlarged] : the thyroid was not enlarged [No Thyroid Nodules] : no palpable thyroid nodules [No Respiratory Distress] : no respiratory distress [Clear to Auscultation] : lungs were clear to auscultation bilaterally [Normal S1, S2] : normal S1 and S2 [Regular Rhythm] : with a regular rhythm [Normal Bowel Sounds] : normal bowel sounds [Not Tender] : non-tender [Not Distended] : not distended [Soft] : abdomen soft [Normal Anterior Cervical Nodes] : no anterior cervical lymphadenopathy [No Clubbing, Cyanosis] : no clubbing  or cyanosis of the fingernails [No Rash] : no rash [No Tremors] : no tremors [Normal Affect] : the affect was normal [Normal Mood] : the mood was normal [Acanthosis Nigricans] : no acanthosis nigricans [de-identified] : b/l edema [de-identified] : contracted UE B/L, using wheelchair

## 2024-06-08 NOTE — REVIEW OF SYSTEMS
[Fatigue] : fatigue [Recent Weight Gain (___ Lbs)] : recent weight gain: [unfilled] lbs [Irregular Menses] : irregular menses [Joint Pain] : joint pain [Dry Skin] : dry skin [Hair Loss] : hair loss [Headaches] : headaches [Hot Flashes] : hot flashes [Swelling] : swelling [Negative] : Respiratory [Constipation] : no constipation [Pain/Numbness of Digits] : pain/numbness of digits [Polydipsia] : no polydipsia [de-identified] : b/l UE weakness

## 2024-06-10 LAB
25(OH)D3 SERPL-MCNC: 57.4 NG/ML
ALBUMIN SERPL ELPH-MCNC: 4.3 G/DL
ALP BLD-CCNC: 79 U/L
ALT SERPL-CCNC: 14 U/L
ANION GAP SERPL CALC-SCNC: 13 MMOL/L
AST SERPL-CCNC: 15 U/L
BASOPHILS # BLD AUTO: 0.06 K/UL
BASOPHILS NFR BLD AUTO: 1 %
BILIRUB SERPL-MCNC: <0.2 MG/DL
BUN SERPL-MCNC: 16 MG/DL
CALCIUM SERPL-MCNC: 9.6 MG/DL
CHLORIDE SERPL-SCNC: 100 MMOL/L
CHOLEST SERPL-MCNC: 252 MG/DL
CO2 SERPL-SCNC: 23 MMOL/L
CREAT SERPL-MCNC: 0.37 MG/DL
EGFR: 120 ML/MIN/1.73M2
EOSINOPHIL # BLD AUTO: 0.2 K/UL
EOSINOPHIL NFR BLD AUTO: 3.4 %
ESTRADIOL SERPL-MCNC: 8 PG/ML
FERRITIN SERPL-MCNC: 32 NG/ML
FSH SERPL-MCNC: 93.2 IU/L
GLUCOSE SERPL-MCNC: 93 MG/DL
HCT VFR BLD CALC: 38 %
HDLC SERPL-MCNC: 85 MG/DL
HGB BLD-MCNC: 12.8 G/DL
IMM GRANULOCYTES NFR BLD AUTO: 0.2 %
IRON SATN MFR SERPL: 47 %
IRON SERPL-MCNC: 163 UG/DL
LDLC SERPL CALC-MCNC: 158 MG/DL
LH SERPL-ACNC: 48.9 IU/L
LYMPHOCYTES # BLD AUTO: 2.61 K/UL
LYMPHOCYTES NFR BLD AUTO: 44.4 %
MAN DIFF?: NORMAL
MCHC RBC-ENTMCNC: 30 PG
MCHC RBC-ENTMCNC: 33.7 GM/DL
MCV RBC AUTO: 89 FL
MONOCYTES # BLD AUTO: 0.39 K/UL
MONOCYTES NFR BLD AUTO: 6.6 %
NEUTROPHILS # BLD AUTO: 2.61 K/UL
NEUTROPHILS NFR BLD AUTO: 44.4 %
NONHDLC SERPL-MCNC: 167 MG/DL
PLATELET # BLD AUTO: 267 K/UL
POTASSIUM SERPL-SCNC: 4.2 MMOL/L
PROLACTIN SERPL-MCNC: 74.6 NG/ML
PROT SERPL-MCNC: 7.2 G/DL
RBC # BLD: 4.27 M/UL
RBC # FLD: 12.3 %
SODIUM SERPL-SCNC: 136 MMOL/L
T4 FREE SERPL-MCNC: 1.1 NG/DL
TIBC SERPL-MCNC: 346 UG/DL
TRIGL SERPL-MCNC: 53 MG/DL
TSH SERPL-ACNC: 3.59 UIU/ML
UIBC SERPL-MCNC: 183 UG/DL
WBC # FLD AUTO: 5.88 K/UL

## 2024-06-12 ENCOUNTER — LABORATORY RESULT (OUTPATIENT)
Age: 54
End: 2024-06-12

## 2024-06-12 ENCOUNTER — APPOINTMENT (OUTPATIENT)
Dept: INTERNAL MEDICINE | Facility: CLINIC | Age: 54
End: 2024-06-12
Payer: MEDICARE

## 2024-06-12 ENCOUNTER — OUTPATIENT (OUTPATIENT)
Dept: OUTPATIENT SERVICES | Facility: HOSPITAL | Age: 54
LOS: 1 days | End: 2024-06-12
Payer: MEDICARE

## 2024-06-12 ENCOUNTER — NON-APPOINTMENT (OUTPATIENT)
Age: 54
End: 2024-06-12

## 2024-06-12 VITALS — DIASTOLIC BLOOD PRESSURE: 68 MMHG | SYSTOLIC BLOOD PRESSURE: 110 MMHG | OXYGEN SATURATION: 98 % | HEART RATE: 71 BPM

## 2024-06-12 DIAGNOSIS — Z98.890 OTHER SPECIFIED POSTPROCEDURAL STATES: Chronic | ICD-10-CM

## 2024-06-12 DIAGNOSIS — I10 ESSENTIAL (PRIMARY) HYPERTENSION: ICD-10-CM

## 2024-06-12 DIAGNOSIS — R30.0 DYSURIA: ICD-10-CM

## 2024-06-12 DIAGNOSIS — Z74.2 NEED FOR ASSISTANCE AT HOME AND NO OTHER HOUSEHOLD MEMBER ABLE TO RENDER CARE: ICD-10-CM

## 2024-06-12 PROCEDURE — 87186 SC STD MICRODIL/AGAR DIL: CPT

## 2024-06-12 PROCEDURE — 81001 URINALYSIS AUTO W/SCOPE: CPT

## 2024-06-12 PROCEDURE — 87086 URINE CULTURE/COLONY COUNT: CPT

## 2024-06-12 PROCEDURE — 99213 OFFICE O/P EST LOW 20 MIN: CPT | Mod: GE

## 2024-06-12 PROCEDURE — 87077 CULTURE AEROBIC IDENTIFY: CPT

## 2024-06-12 PROCEDURE — G0463: CPT

## 2024-06-12 PROCEDURE — 81003 URINALYSIS AUTO W/O SCOPE: CPT

## 2024-06-12 RX ORDER — DICLOFENAC SODIUM 1% 10 MG/G
1 GEL TOPICAL
Qty: 100 | Refills: 1 | Status: DISCONTINUED | COMMUNITY
Start: 2024-01-10 | End: 2024-06-12

## 2024-06-12 RX ORDER — LIDOCAINE 40 MG/G
4 PATCH TOPICAL
Qty: 1 | Refills: 1 | Status: DISCONTINUED | COMMUNITY
Start: 2024-01-10 | End: 2024-06-12

## 2024-06-13 ENCOUNTER — APPOINTMENT (OUTPATIENT)
Dept: INTERNAL MEDICINE | Facility: CLINIC | Age: 54
End: 2024-06-13

## 2024-06-13 NOTE — END OF VISIT
[] : Resident [FreeTextEntry3] : In addition to above, would add that even if UA returns with bacteria, would hesitate to start abx in the absence of pyuria. Would await culture first, pending results would reassess patient sx and consider tx based on symptoms.

## 2024-06-13 NOTE — ASSESSMENT
[FreeTextEntry1] : ASSESSMENT Patient is a 54F with PMH of cerebral palsy, cervical myelopathy, irritable bowel disease, neurogenic bladder, recurrent UTIs, who presents for [facility] with forms for home health care.   On evaluation today, the patient's chronic conditions are stable with renewal needed for home health services. Notes that she worsening dysuria, although POC urinalysis is completely negative. Patient pleased to hear this and does not want antibiotics at this time, pending regular urinalysis.  PLAN #dysuria - POC UA negative - f/u regular UA - no antibiotics at this time  #home health services - documents completed and provided  Followup in 3 months  Case discussed with Dr. Ashutosh Gutierrez, PGY1

## 2024-06-13 NOTE — HISTORY OF PRESENT ILLNESS
[FreeTextEntry1] : RPA for home heatlh paperwork [de-identified] : Patient is a 54F with PMH of cerebral palsy, cervical myelopathy, irritable bowel disease, neurogenic bladder, recurrent UTIs, who presents for RPA with forms for home health care.   Patient states that she has had no issues since last visit. Taking meds as prescribed, no new side effects, however states she feels she may be having more dysuria than usual, with cloudy urine and foul smell. States she has chronic UTIs from neurogenic bladder and has concern for recurrent or worsening infection. Arrives today with a sample of urine from this morning's straight cath by home RN. Patient reports longstanding history of intolerance or allergies to most antibiotics, and that she has many side effects. Has only been able to use keflex and fosfomycin in the past. States she saw Infectious Disease a few months ago regarding chronic UTI, and states she was given Nuzyra antibiotics at that time but still has not used it yet.

## 2024-06-13 NOTE — PHYSICAL EXAM
[No Acute Distress] : no acute distress [Well Nourished] : well nourished [Normal Sclera/Conjunctiva] : normal sclera/conjunctiva [EOMI] : extraocular movements intact [No Respiratory Distress] : no respiratory distress  [No Accessory Muscle Use] : no accessory muscle use [Clear to Auscultation] : lungs were clear to auscultation bilaterally [Normal Rate] : normal rate  [Regular Rhythm] : with a regular rhythm [Normal S1, S2] : normal S1 and S2 [Soft] : abdomen soft [Non Tender] : non-tender [Non-distended] : non-distended [No CVA Tenderness] : no CVA  tenderness [No Rash] : no rash [Normal Affect] : the affect was normal [Normal Insight/Judgement] : insight and judgment were intact [de-identified] : in power chair

## 2024-06-18 ENCOUNTER — NON-APPOINTMENT (OUTPATIENT)
Age: 54
End: 2024-06-18

## 2024-06-18 DIAGNOSIS — R30.0 DYSURIA: ICD-10-CM

## 2024-06-18 DIAGNOSIS — Z74.2 NEED FOR ASSISTANCE AT HOME AND NO OTHER HOUSEHOLD MEMBER ABLE TO RENDER CARE: ICD-10-CM

## 2024-06-18 DIAGNOSIS — N39.0 URINARY TRACT INFECTION, SITE NOT SPECIFIED: ICD-10-CM

## 2024-06-19 ENCOUNTER — APPOINTMENT (OUTPATIENT)
Dept: NEUROLOGY | Facility: CLINIC | Age: 54
End: 2024-06-19

## 2024-06-19 ENCOUNTER — INPATIENT (INPATIENT)
Facility: HOSPITAL | Age: 54
LOS: 7 days | Discharge: HOME | DRG: 93 | End: 2024-06-27
Attending: STUDENT IN AN ORGANIZED HEALTH CARE EDUCATION/TRAINING PROGRAM | Admitting: STUDENT IN AN ORGANIZED HEALTH CARE EDUCATION/TRAINING PROGRAM
Payer: MEDICARE

## 2024-06-19 VITALS
WEIGHT: 130.07 LBS | DIASTOLIC BLOOD PRESSURE: 81 MMHG | OXYGEN SATURATION: 99 % | TEMPERATURE: 99 F | RESPIRATION RATE: 20 BRPM | HEART RATE: 104 BPM | SYSTOLIC BLOOD PRESSURE: 131 MMHG

## 2024-06-19 DIAGNOSIS — Z98.890 OTHER SPECIFIED POSTPROCEDURAL STATES: Chronic | ICD-10-CM

## 2024-06-19 LAB
ALBUMIN SERPL ELPH-MCNC: 4.4 G/DL — SIGNIFICANT CHANGE UP (ref 3.3–5)
ALP SERPL-CCNC: 82 U/L — SIGNIFICANT CHANGE UP (ref 40–120)
ALT FLD-CCNC: 18 U/L — SIGNIFICANT CHANGE UP (ref 10–45)
ANION GAP SERPL CALC-SCNC: 11 MMOL/L — SIGNIFICANT CHANGE UP (ref 5–17)
APPEARANCE UR: CLEAR — SIGNIFICANT CHANGE UP
APTT BLD: 32 SEC — SIGNIFICANT CHANGE UP (ref 24.5–35.6)
AST SERPL-CCNC: 15 U/L — SIGNIFICANT CHANGE UP (ref 10–40)
BACTERIA # UR AUTO: ABNORMAL /HPF
BASOPHILS # BLD AUTO: 0.05 K/UL — SIGNIFICANT CHANGE UP (ref 0–0.2)
BASOPHILS NFR BLD AUTO: 0.7 % — SIGNIFICANT CHANGE UP (ref 0–2)
BILIRUB SERPL-MCNC: 0.2 MG/DL — SIGNIFICANT CHANGE UP (ref 0.2–1.2)
BILIRUB UR-MCNC: NEGATIVE — SIGNIFICANT CHANGE UP
BUN SERPL-MCNC: 14 MG/DL — SIGNIFICANT CHANGE UP (ref 7–23)
CALCIUM SERPL-MCNC: 9.9 MG/DL — SIGNIFICANT CHANGE UP (ref 8.4–10.5)
CAST: 2 /LPF — SIGNIFICANT CHANGE UP (ref 0–4)
CHLORIDE SERPL-SCNC: 101 MMOL/L — SIGNIFICANT CHANGE UP (ref 96–108)
CO2 SERPL-SCNC: 24 MMOL/L — SIGNIFICANT CHANGE UP (ref 22–31)
COLOR SPEC: YELLOW — SIGNIFICANT CHANGE UP
CREAT SERPL-MCNC: 0.32 MG/DL — LOW (ref 0.5–1.3)
DIFF PNL FLD: ABNORMAL
EGFR: 124 ML/MIN/1.73M2 — SIGNIFICANT CHANGE UP
EOSINOPHIL # BLD AUTO: 0.08 K/UL — SIGNIFICANT CHANGE UP (ref 0–0.5)
EOSINOPHIL NFR BLD AUTO: 1.2 % — SIGNIFICANT CHANGE UP (ref 0–6)
GLUCOSE SERPL-MCNC: 93 MG/DL — SIGNIFICANT CHANGE UP (ref 70–99)
GLUCOSE UR QL: NEGATIVE MG/DL — SIGNIFICANT CHANGE UP
HCG SERPL-ACNC: 2.7 MIU/ML — SIGNIFICANT CHANGE UP
HCT VFR BLD CALC: 38.9 % — SIGNIFICANT CHANGE UP (ref 34.5–45)
HGB BLD-MCNC: 12.8 G/DL — SIGNIFICANT CHANGE UP (ref 11.5–15.5)
IMM GRANULOCYTES NFR BLD AUTO: 0.1 % — SIGNIFICANT CHANGE UP (ref 0–0.9)
INR BLD: 0.97 RATIO — SIGNIFICANT CHANGE UP (ref 0.85–1.18)
KETONES UR-MCNC: NEGATIVE MG/DL — SIGNIFICANT CHANGE UP
LEUKOCYTE ESTERASE UR-ACNC: NEGATIVE — SIGNIFICANT CHANGE UP
LYMPHOCYTES # BLD AUTO: 2.07 K/UL — SIGNIFICANT CHANGE UP (ref 1–3.3)
LYMPHOCYTES # BLD AUTO: 30.4 % — SIGNIFICANT CHANGE UP (ref 13–44)
MAGNESIUM SERPL-MCNC: 2.3 MG/DL — SIGNIFICANT CHANGE UP (ref 1.6–2.6)
MCHC RBC-ENTMCNC: 29.7 PG — SIGNIFICANT CHANGE UP (ref 27–34)
MCHC RBC-ENTMCNC: 32.9 GM/DL — SIGNIFICANT CHANGE UP (ref 32–36)
MCV RBC AUTO: 90.3 FL — SIGNIFICANT CHANGE UP (ref 80–100)
MONOCYTES # BLD AUTO: 0.63 K/UL — SIGNIFICANT CHANGE UP (ref 0–0.9)
MONOCYTES NFR BLD AUTO: 9.2 % — SIGNIFICANT CHANGE UP (ref 2–14)
NEUTROPHILS # BLD AUTO: 3.98 K/UL — SIGNIFICANT CHANGE UP (ref 1.8–7.4)
NEUTROPHILS NFR BLD AUTO: 58.4 % — SIGNIFICANT CHANGE UP (ref 43–77)
NITRITE UR-MCNC: NEGATIVE — SIGNIFICANT CHANGE UP
NRBC # BLD: 0 /100 WBCS — SIGNIFICANT CHANGE UP (ref 0–0)
PH UR: 7.5 — SIGNIFICANT CHANGE UP (ref 5–8)
PHOSPHATE SERPL-MCNC: 4 MG/DL — SIGNIFICANT CHANGE UP (ref 2.5–4.5)
PLATELET # BLD AUTO: 227 K/UL — SIGNIFICANT CHANGE UP (ref 150–400)
POTASSIUM SERPL-MCNC: 4.3 MMOL/L — SIGNIFICANT CHANGE UP (ref 3.5–5.3)
POTASSIUM SERPL-SCNC: 4.3 MMOL/L — SIGNIFICANT CHANGE UP (ref 3.5–5.3)
PROT SERPL-MCNC: 7.8 G/DL — SIGNIFICANT CHANGE UP (ref 6–8.3)
PROT UR-MCNC: NEGATIVE MG/DL — SIGNIFICANT CHANGE UP
PROTHROM AB SERPL-ACNC: 10.7 SEC — SIGNIFICANT CHANGE UP (ref 9.5–13)
RBC # BLD: 4.31 M/UL — SIGNIFICANT CHANGE UP (ref 3.8–5.2)
RBC # FLD: 11.9 % — SIGNIFICANT CHANGE UP (ref 10.3–14.5)
RBC CASTS # UR COMP ASSIST: 4 /HPF — SIGNIFICANT CHANGE UP (ref 0–4)
SODIUM SERPL-SCNC: 136 MMOL/L — SIGNIFICANT CHANGE UP (ref 135–145)
SP GR SPEC: 1.01 — SIGNIFICANT CHANGE UP (ref 1–1.03)
SQUAMOUS # UR AUTO: 1 /HPF — SIGNIFICANT CHANGE UP (ref 0–5)
UROBILINOGEN FLD QL: 0.2 MG/DL — SIGNIFICANT CHANGE UP (ref 0.2–1)
WBC # BLD: 6.82 K/UL — SIGNIFICANT CHANGE UP (ref 3.8–10.5)
WBC # FLD AUTO: 6.82 K/UL — SIGNIFICANT CHANGE UP (ref 3.8–10.5)
WBC UR QL: 1 /HPF — SIGNIFICANT CHANGE UP (ref 0–5)

## 2024-06-19 PROCEDURE — 72141 MRI NECK SPINE W/O DYE: CPT | Mod: 26,MH

## 2024-06-19 PROCEDURE — 72148 MRI LUMBAR SPINE W/O DYE: CPT | Mod: 26,MH

## 2024-06-19 PROCEDURE — 99285 EMERGENCY DEPT VISIT HI MDM: CPT | Mod: FS

## 2024-06-19 PROCEDURE — 72146 MRI CHEST SPINE W/O DYE: CPT | Mod: 26,MH

## 2024-06-20 DIAGNOSIS — G95.89 OTHER SPECIFIED DISEASES OF SPINAL CORD: ICD-10-CM

## 2024-06-20 PROCEDURE — 72157 MRI CHEST SPINE W/O & W/DYE: CPT | Mod: 26

## 2024-06-20 PROCEDURE — 72156 MRI NECK SPINE W/O & W/DYE: CPT | Mod: 26

## 2024-06-20 RX ORDER — ACETAMINOPHEN 325 MG
1000 TABLET ORAL ONCE
Refills: 0 | Status: COMPLETED | OUTPATIENT
Start: 2024-06-20 | End: 2024-06-20

## 2024-06-20 RX ORDER — ACETAMINOPHEN 500 MG
2 TABLET ORAL
Qty: 0 | Refills: 0 | DISCHARGE

## 2024-06-20 RX ORDER — ENOXAPARIN SODIUM 100 MG/ML
40 INJECTION SUBCUTANEOUS
Refills: 0 | Status: DISCONTINUED | OUTPATIENT
Start: 2024-06-20 | End: 2024-06-25

## 2024-06-20 RX ORDER — CEPHALEXIN 500 MG
1 CAPSULE ORAL
Qty: 0 | Refills: 0 | DISCHARGE

## 2024-06-20 RX ADMIN — Medication 400 MILLIGRAM(S): at 22:17

## 2024-06-21 ENCOUNTER — RESULT REVIEW (OUTPATIENT)
Age: 54
End: 2024-06-21

## 2024-06-21 LAB
APPEARANCE CSF: CLEAR — SIGNIFICANT CHANGE UP
APPEARANCE: CLEAR
BILIRUB UR QL STRIP: NORMAL
BILIRUBIN URINE: NEGATIVE
BLOOD URINE: NEGATIVE
CLARITY UR: CLEAR
COLLECTION METHOD: NORMAL
COLOR CSF: SIGNIFICANT CHANGE UP
COLOR: YELLOW
GLUCOSE CSF-MCNC: 74 MG/DL — HIGH (ref 40–70)
GLUCOSE QUALITATIVE U: NEGATIVE MG/DL
GLUCOSE UR-MCNC: NORMAL
GRAM STN FLD: SIGNIFICANT CHANGE UP
HCG UR QL: 0.2 EU/DL
HCT VFR BLD CALC: 38.4 % — SIGNIFICANT CHANGE UP (ref 34.5–45)
HGB BLD-MCNC: 12.5 G/DL — SIGNIFICANT CHANGE UP (ref 11.5–15.5)
HGB UR QL STRIP.AUTO: NORMAL
KETONES UR-MCNC: NORMAL
KETONES URINE: NEGATIVE MG/DL
LDH CSF L TO P-CCNC: 16 U/L — SIGNIFICANT CHANGE UP
LDH FLD-CCNC: 16 U/L — SIGNIFICANT CHANGE UP
LEUKOCYTE ESTERASE UR QL STRIP: NORMAL
LEUKOCYTE ESTERASE URINE: ABNORMAL
MCHC RBC-ENTMCNC: 29.4 PG — SIGNIFICANT CHANGE UP (ref 27–34)
MCHC RBC-ENTMCNC: 32.6 GM/DL — SIGNIFICANT CHANGE UP (ref 32–36)
MCV RBC AUTO: 90.4 FL — SIGNIFICANT CHANGE UP (ref 80–100)
NEUTROPHILS # CSF: SIGNIFICANT CHANGE UP (ref 0–6)
NIGHT BLUE STAIN TISS: SIGNIFICANT CHANGE UP
NITRITE UR QL STRIP: NORMAL
NITRITE URINE: NEGATIVE
NRBC # BLD: 0 /100 WBCS — SIGNIFICANT CHANGE UP (ref 0–0)
NRBC NFR CSF: <1 /UL — SIGNIFICANT CHANGE UP (ref 0–5)
PH UR STRIP: 5.5
PH URINE: 6
PLATELET # BLD AUTO: 204 K/UL — SIGNIFICANT CHANGE UP (ref 150–400)
PROT CSF-MCNC: 27 MG/DL — SIGNIFICANT CHANGE UP (ref 15–45)
PROT UR STRIP-MCNC: NORMAL
PROTEIN URINE: NEGATIVE MG/DL
RBC # BLD: 4.25 M/UL — SIGNIFICANT CHANGE UP (ref 3.8–5.2)
RBC # CSF: 59 /UL — HIGH (ref 0–0)
RBC # FLD: 12 % — SIGNIFICANT CHANGE UP (ref 10.3–14.5)
SP GR UR STRIP: 1
SPECIFIC GRAVITY URINE: 1.01
SPECIMEN SOURCE: SIGNIFICANT CHANGE UP
SPECIMEN SOURCE: SIGNIFICANT CHANGE UP
TUBE TYPE: SIGNIFICANT CHANGE UP
UROBILINOGEN URINE: 0.2 MG/DL
WBC # BLD: 5.44 K/UL — SIGNIFICANT CHANGE UP (ref 3.8–10.5)
WBC # FLD AUTO: 5.44 K/UL — SIGNIFICANT CHANGE UP (ref 3.8–10.5)

## 2024-06-21 PROCEDURE — 62328 DX LMBR SPI PNXR W/FLUOR/CT: CPT

## 2024-06-21 PROCEDURE — 99223 1ST HOSP IP/OBS HIGH 75: CPT | Mod: GC

## 2024-06-21 PROCEDURE — 88108 CYTOPATH CONCENTRATE TECH: CPT | Mod: 26

## 2024-06-21 PROCEDURE — ZZZZZ: CPT

## 2024-06-21 RX ADMIN — ENOXAPARIN SODIUM 40 MILLIGRAM(S): 100 INJECTION SUBCUTANEOUS at 21:33

## 2024-06-21 RX ADMIN — Medication 1000 MILLIGRAM(S): at 00:36

## 2024-06-22 LAB
ANION GAP SERPL CALC-SCNC: 12 MMOL/L — SIGNIFICANT CHANGE UP (ref 5–17)
BUN SERPL-MCNC: 12 MG/DL — SIGNIFICANT CHANGE UP (ref 7–23)
CALCIUM SERPL-MCNC: 9.6 MG/DL — SIGNIFICANT CHANGE UP (ref 8.4–10.5)
CHLORIDE SERPL-SCNC: 100 MMOL/L — SIGNIFICANT CHANGE UP (ref 96–108)
CO2 SERPL-SCNC: 24 MMOL/L — SIGNIFICANT CHANGE UP (ref 22–31)
CREAT SERPL-MCNC: 0.42 MG/DL — LOW (ref 0.5–1.3)
EGFR: 116 ML/MIN/1.73M2 — SIGNIFICANT CHANGE UP
GLUCOSE SERPL-MCNC: 84 MG/DL — SIGNIFICANT CHANGE UP (ref 70–99)
HCT VFR BLD CALC: 39.2 % — SIGNIFICANT CHANGE UP (ref 34.5–45)
HGB BLD-MCNC: 13 G/DL — SIGNIFICANT CHANGE UP (ref 11.5–15.5)
MCHC RBC-ENTMCNC: 29.7 PG — SIGNIFICANT CHANGE UP (ref 27–34)
MCHC RBC-ENTMCNC: 33.2 GM/DL — SIGNIFICANT CHANGE UP (ref 32–36)
MCV RBC AUTO: 89.7 FL — SIGNIFICANT CHANGE UP (ref 80–100)
NRBC # BLD: 0 /100 WBCS — SIGNIFICANT CHANGE UP (ref 0–0)
PLATELET # BLD AUTO: 206 K/UL — SIGNIFICANT CHANGE UP (ref 150–400)
POTASSIUM SERPL-MCNC: 3.8 MMOL/L — SIGNIFICANT CHANGE UP (ref 3.5–5.3)
POTASSIUM SERPL-SCNC: 3.8 MMOL/L — SIGNIFICANT CHANGE UP (ref 3.5–5.3)
RBC # BLD: 4.37 M/UL — SIGNIFICANT CHANGE UP (ref 3.8–5.2)
RBC # FLD: 11.9 % — SIGNIFICANT CHANGE UP (ref 10.3–14.5)
SODIUM SERPL-SCNC: 136 MMOL/L — SIGNIFICANT CHANGE UP (ref 135–145)
WBC # BLD: 5.82 K/UL — SIGNIFICANT CHANGE UP (ref 3.8–10.5)
WBC # FLD AUTO: 5.82 K/UL — SIGNIFICANT CHANGE UP (ref 3.8–10.5)

## 2024-06-22 PROCEDURE — 99231 SBSQ HOSP IP/OBS SF/LOW 25: CPT | Mod: GC

## 2024-06-22 RX ORDER — POLYETHYLENE GLYCOL 3350 1 G/G
17 POWDER ORAL DAILY
Refills: 0 | Status: DISCONTINUED | OUTPATIENT
Start: 2024-06-22 | End: 2024-06-27

## 2024-06-22 RX ORDER — ALPRAZOLAM 2 MG/1
0.25 TABLET ORAL DAILY
Refills: 0 | Status: DISCONTINUED | OUTPATIENT
Start: 2024-06-22 | End: 2024-06-27

## 2024-06-22 RX ORDER — ACETAMINOPHEN 325 MG
650 TABLET ORAL EVERY 6 HOURS
Refills: 0 | Status: DISCONTINUED | OUTPATIENT
Start: 2024-06-22 | End: 2024-06-27

## 2024-06-22 RX ORDER — SENNOSIDES 8.6 MG
2 TABLET ORAL AT BEDTIME
Refills: 0 | Status: DISCONTINUED | OUTPATIENT
Start: 2024-06-22 | End: 2024-06-27

## 2024-06-22 RX ORDER — ZINC SULFATE 50(220)MG
50 CAPSULE ORAL DAILY
Refills: 0 | Status: DISCONTINUED | OUTPATIENT
Start: 2024-06-22 | End: 2024-06-22

## 2024-06-22 RX ORDER — GABAPENTIN
100 POWDER (GRAM) MISCELLANEOUS EVERY 12 HOURS
Refills: 0 | Status: DISCONTINUED | OUTPATIENT
Start: 2024-06-22 | End: 2024-06-27

## 2024-06-22 RX ORDER — ZINC SULFATE 50(220)MG
220 CAPSULE ORAL DAILY
Refills: 0 | Status: DISCONTINUED | OUTPATIENT
Start: 2024-06-22 | End: 2024-06-26

## 2024-06-22 RX ORDER — CYANOCOBALAMIN (VITAMIN B-12) 1000 MCG
500 TABLET, EXTENDED RELEASE ORAL DAILY
Refills: 0 | Status: DISCONTINUED | OUTPATIENT
Start: 2024-06-22 | End: 2024-06-27

## 2024-06-22 RX ADMIN — POLYETHYLENE GLYCOL 3350 17 GRAM(S): 1 POWDER ORAL at 22:53

## 2024-06-22 RX ADMIN — Medication 2 TABLET(S): at 22:53

## 2024-06-22 RX ADMIN — ENOXAPARIN SODIUM 40 MILLIGRAM(S): 100 INJECTION SUBCUTANEOUS at 22:53

## 2024-06-23 LAB
-  AMOXICILLIN/CLAVULANIC ACID: SIGNIFICANT CHANGE UP
-  AMPICILLIN/SULBACTAM: SIGNIFICANT CHANGE UP
-  AMPICILLIN: SIGNIFICANT CHANGE UP
-  AZTREONAM: SIGNIFICANT CHANGE UP
-  CEFAZOLIN: SIGNIFICANT CHANGE UP
-  CEFEPIME: SIGNIFICANT CHANGE UP
-  CEFOXITIN: SIGNIFICANT CHANGE UP
-  CEFTRIAXONE: SIGNIFICANT CHANGE UP
-  CIPROFLOXACIN: SIGNIFICANT CHANGE UP
-  ERTAPENEM: SIGNIFICANT CHANGE UP
-  GENTAMICIN: SIGNIFICANT CHANGE UP
-  IMIPENEM: SIGNIFICANT CHANGE UP
-  LEVOFLOXACIN: SIGNIFICANT CHANGE UP
-  MEROPENEM: SIGNIFICANT CHANGE UP
-  NITROFURANTOIN: SIGNIFICANT CHANGE UP
-  PIPERACILLIN/TAZOBACTAM: SIGNIFICANT CHANGE UP
-  TOBRAMYCIN: SIGNIFICANT CHANGE UP
-  TRIMETHOPRIM/SULFAMETHOXAZOLE: SIGNIFICANT CHANGE UP
ANION GAP SERPL CALC-SCNC: 14 MMOL/L — SIGNIFICANT CHANGE UP (ref 5–17)
BUN SERPL-MCNC: 13 MG/DL — SIGNIFICANT CHANGE UP (ref 7–23)
CALCIUM SERPL-MCNC: 9.5 MG/DL — SIGNIFICANT CHANGE UP (ref 8.4–10.5)
CHLORIDE SERPL-SCNC: 99 MMOL/L — SIGNIFICANT CHANGE UP (ref 96–108)
CO2 SERPL-SCNC: 24 MMOL/L — SIGNIFICANT CHANGE UP (ref 22–31)
CREAT SERPL-MCNC: 0.36 MG/DL — LOW (ref 0.5–1.3)
CULTURE RESULTS: ABNORMAL
EGFR: 121 ML/MIN/1.73M2 — SIGNIFICANT CHANGE UP
GLUCOSE SERPL-MCNC: 86 MG/DL — SIGNIFICANT CHANGE UP (ref 70–99)
HCT VFR BLD CALC: 38.7 % — SIGNIFICANT CHANGE UP (ref 34.5–45)
HGB BLD-MCNC: 12.9 G/DL — SIGNIFICANT CHANGE UP (ref 11.5–15.5)
MCHC RBC-ENTMCNC: 30.1 PG — SIGNIFICANT CHANGE UP (ref 27–34)
MCHC RBC-ENTMCNC: 33.3 GM/DL — SIGNIFICANT CHANGE UP (ref 32–36)
MCV RBC AUTO: 90.2 FL — SIGNIFICANT CHANGE UP (ref 80–100)
METHOD TYPE: SIGNIFICANT CHANGE UP
NRBC # BLD: 0 /100 WBCS — SIGNIFICANT CHANGE UP (ref 0–0)
ORGANISM # SPEC MICROSCOPIC CNT: ABNORMAL
ORGANISM # SPEC MICROSCOPIC CNT: ABNORMAL
PLATELET # BLD AUTO: 207 K/UL — SIGNIFICANT CHANGE UP (ref 150–400)
POTASSIUM SERPL-MCNC: 4 MMOL/L — SIGNIFICANT CHANGE UP (ref 3.5–5.3)
POTASSIUM SERPL-SCNC: 4 MMOL/L — SIGNIFICANT CHANGE UP (ref 3.5–5.3)
RBC # BLD: 4.29 M/UL — SIGNIFICANT CHANGE UP (ref 3.8–5.2)
RBC # FLD: 12.1 % — SIGNIFICANT CHANGE UP (ref 10.3–14.5)
SODIUM SERPL-SCNC: 137 MMOL/L — SIGNIFICANT CHANGE UP (ref 135–145)
SPECIMEN SOURCE: SIGNIFICANT CHANGE UP
WBC # BLD: 5.75 K/UL — SIGNIFICANT CHANGE UP (ref 3.8–10.5)
WBC # FLD AUTO: 5.75 K/UL — SIGNIFICANT CHANGE UP (ref 3.8–10.5)

## 2024-06-23 RX ADMIN — Medication 220 MILLIGRAM(S): at 05:48

## 2024-06-23 RX ADMIN — Medication 1000 MILLIGRAM(S): at 05:48

## 2024-06-23 RX ADMIN — Medication 5000 UNIT(S): at 05:48

## 2024-06-23 RX ADMIN — ENOXAPARIN SODIUM 40 MILLIGRAM(S): 100 INJECTION SUBCUTANEOUS at 22:18

## 2024-06-23 RX ADMIN — Medication 500 MICROGRAM(S): at 05:48

## 2024-06-24 LAB
ANION GAP SERPL CALC-SCNC: 12 MMOL/L — SIGNIFICANT CHANGE UP (ref 5–17)
APPEARANCE UR: ABNORMAL
BACTERIA # UR AUTO: ABNORMAL /HPF
BILIRUB UR-MCNC: NEGATIVE — SIGNIFICANT CHANGE UP
BUN SERPL-MCNC: 17 MG/DL — SIGNIFICANT CHANGE UP (ref 7–23)
CALCIUM SERPL-MCNC: 9.2 MG/DL — SIGNIFICANT CHANGE UP (ref 8.4–10.5)
CAST: 1 /LPF — SIGNIFICANT CHANGE UP (ref 0–4)
CHLORIDE SERPL-SCNC: 100 MMOL/L — SIGNIFICANT CHANGE UP (ref 96–108)
CO2 SERPL-SCNC: 25 MMOL/L — SIGNIFICANT CHANGE UP (ref 22–31)
COLOR SPEC: YELLOW — SIGNIFICANT CHANGE UP
CREAT SERPL-MCNC: 0.36 MG/DL — LOW (ref 0.5–1.3)
DIFF PNL FLD: NEGATIVE — SIGNIFICANT CHANGE UP
EBV PCR: SIGNIFICANT CHANGE UP IU/ML
EGFR: 121 ML/MIN/1.73M2 — SIGNIFICANT CHANGE UP
GLUCOSE SERPL-MCNC: 91 MG/DL — SIGNIFICANT CHANGE UP (ref 70–99)
GLUCOSE UR QL: NEGATIVE MG/DL — SIGNIFICANT CHANGE UP
HCT VFR BLD CALC: 38.1 % — SIGNIFICANT CHANGE UP (ref 34.5–45)
HGB BLD-MCNC: 12.7 G/DL — SIGNIFICANT CHANGE UP (ref 11.5–15.5)
INNER EAR 68KD AB FLD QL: <1.5 U/L — SIGNIFICANT CHANGE UP (ref 0–3.1)
JCPYV DNA # CSF NAA+PROBE: SIGNIFICANT CHANGE UP COPIES/ML
KETONES UR-MCNC: NEGATIVE MG/DL — SIGNIFICANT CHANGE UP
LEUKOCYTE ESTERASE UR-ACNC: ABNORMAL
MCHC RBC-ENTMCNC: 30 PG — SIGNIFICANT CHANGE UP (ref 27–34)
MCHC RBC-ENTMCNC: 33.3 GM/DL — SIGNIFICANT CHANGE UP (ref 32–36)
MCV RBC AUTO: 89.9 FL — SIGNIFICANT CHANGE UP (ref 80–100)
NITRITE UR-MCNC: POSITIVE
NRBC # BLD: 0 /100 WBCS — SIGNIFICANT CHANGE UP (ref 0–0)
PH UR: 6.5 — SIGNIFICANT CHANGE UP (ref 5–8)
PLATELET # BLD AUTO: 211 K/UL — SIGNIFICANT CHANGE UP (ref 150–400)
POTASSIUM SERPL-MCNC: 4.1 MMOL/L — SIGNIFICANT CHANGE UP (ref 3.5–5.3)
POTASSIUM SERPL-SCNC: 4.1 MMOL/L — SIGNIFICANT CHANGE UP (ref 3.5–5.3)
PROT UR-MCNC: NEGATIVE MG/DL — SIGNIFICANT CHANGE UP
RBC # BLD: 4.24 M/UL — SIGNIFICANT CHANGE UP (ref 3.8–5.2)
RBC # FLD: 12 % — SIGNIFICANT CHANGE UP (ref 10.3–14.5)
RBC CASTS # UR COMP ASSIST: 1 /HPF — SIGNIFICANT CHANGE UP (ref 0–4)
SODIUM SERPL-SCNC: 137 MMOL/L — SIGNIFICANT CHANGE UP (ref 135–145)
SP GR SPEC: 1.02 — SIGNIFICANT CHANGE UP (ref 1–1.03)
SQUAMOUS # UR AUTO: 1 /HPF — SIGNIFICANT CHANGE UP (ref 0–5)
TM INTERPRETATION: SIGNIFICANT CHANGE UP
UROBILINOGEN FLD QL: 0.2 MG/DL — SIGNIFICANT CHANGE UP (ref 0.2–1)
VDRL CSF-TITR: SIGNIFICANT CHANGE UP
WBC # BLD: 6.08 K/UL — SIGNIFICANT CHANGE UP (ref 3.8–10.5)
WBC # FLD AUTO: 6.08 K/UL — SIGNIFICANT CHANGE UP (ref 3.8–10.5)
WBC UR QL: 29 /HPF — HIGH (ref 0–5)
WNV IGG CSF IA-ACNC: NEGATIVE — SIGNIFICANT CHANGE UP
WNV IGM CSF IA-ACNC: NEGATIVE — SIGNIFICANT CHANGE UP

## 2024-06-24 PROCEDURE — 72050 X-RAY EXAM NECK SPINE 4/5VWS: CPT | Mod: 26

## 2024-06-24 PROCEDURE — 99233 SBSQ HOSP IP/OBS HIGH 50: CPT | Mod: GC

## 2024-06-24 RX ORDER — CEFTRIAXONE SODIUM 500 MG
1000 VIAL (EA) INJECTION EVERY 24 HOURS
Refills: 0 | Status: COMPLETED | OUTPATIENT
Start: 2024-06-24 | End: 2024-06-26

## 2024-06-24 RX ADMIN — ENOXAPARIN SODIUM 40 MILLIGRAM(S): 100 INJECTION SUBCUTANEOUS at 21:15

## 2024-06-24 RX ADMIN — Medication 100 MILLIGRAM(S): at 18:35

## 2024-06-24 RX ADMIN — Medication 2 TABLET(S): at 21:15

## 2024-06-25 LAB
ANION GAP SERPL CALC-SCNC: 14 MMOL/L — SIGNIFICANT CHANGE UP (ref 5–17)
BASOPHILS # BLD AUTO: 0.05 K/UL — SIGNIFICANT CHANGE UP (ref 0–0.2)
BASOPHILS NFR BLD AUTO: 0.9 % — SIGNIFICANT CHANGE UP (ref 0–2)
BUN SERPL-MCNC: 14 MG/DL — SIGNIFICANT CHANGE UP (ref 7–23)
CALCIUM SERPL-MCNC: 9.7 MG/DL — SIGNIFICANT CHANGE UP (ref 8.4–10.5)
CHLORIDE SERPL-SCNC: 99 MMOL/L — SIGNIFICANT CHANGE UP (ref 96–108)
CO2 SERPL-SCNC: 24 MMOL/L — SIGNIFICANT CHANGE UP (ref 22–31)
CREAT SERPL-MCNC: 0.36 MG/DL — LOW (ref 0.5–1.3)
EGFR: 121 ML/MIN/1.73M2 — SIGNIFICANT CHANGE UP
EOSINOPHIL # BLD AUTO: 0.21 K/UL — SIGNIFICANT CHANGE UP (ref 0–0.5)
EOSINOPHIL NFR BLD AUTO: 3.6 % — SIGNIFICANT CHANGE UP (ref 0–6)
GLUCOSE SERPL-MCNC: 83 MG/DL — SIGNIFICANT CHANGE UP (ref 70–99)
HCT VFR BLD CALC: 37 % — SIGNIFICANT CHANGE UP (ref 34.5–45)
HGB BLD-MCNC: 12.6 G/DL — SIGNIFICANT CHANGE UP (ref 11.5–15.5)
IMM GRANULOCYTES NFR BLD AUTO: 0.2 % — SIGNIFICANT CHANGE UP (ref 0–0.9)
LYMPHOCYTES # BLD AUTO: 2.52 K/UL — SIGNIFICANT CHANGE UP (ref 1–3.3)
LYMPHOCYTES # BLD AUTO: 42.9 % — SIGNIFICANT CHANGE UP (ref 13–44)
MCHC RBC-ENTMCNC: 30.1 PG — SIGNIFICANT CHANGE UP (ref 27–34)
MCHC RBC-ENTMCNC: 34.1 GM/DL — SIGNIFICANT CHANGE UP (ref 32–36)
MCV RBC AUTO: 88.5 FL — SIGNIFICANT CHANGE UP (ref 80–100)
MONOCYTES # BLD AUTO: 0.52 K/UL — SIGNIFICANT CHANGE UP (ref 0–0.9)
MONOCYTES NFR BLD AUTO: 8.9 % — SIGNIFICANT CHANGE UP (ref 2–14)
NEUTROPHILS # BLD AUTO: 2.56 K/UL — SIGNIFICANT CHANGE UP (ref 1.8–7.4)
NEUTROPHILS NFR BLD AUTO: 43.5 % — SIGNIFICANT CHANGE UP (ref 43–77)
NRBC # BLD: 0 /100 WBCS — SIGNIFICANT CHANGE UP (ref 0–0)
PLATELET # BLD AUTO: 214 K/UL — SIGNIFICANT CHANGE UP (ref 150–400)
POTASSIUM SERPL-MCNC: 3.8 MMOL/L — SIGNIFICANT CHANGE UP (ref 3.5–5.3)
POTASSIUM SERPL-SCNC: 3.8 MMOL/L — SIGNIFICANT CHANGE UP (ref 3.5–5.3)
RBC # BLD: 4.18 M/UL — SIGNIFICANT CHANGE UP (ref 3.8–5.2)
RBC # FLD: 11.9 % — SIGNIFICANT CHANGE UP (ref 10.3–14.5)
SODIUM SERPL-SCNC: 137 MMOL/L — SIGNIFICANT CHANGE UP (ref 135–145)
WBC # BLD: 5.87 K/UL — SIGNIFICANT CHANGE UP (ref 3.8–10.5)
WBC # FLD AUTO: 5.87 K/UL — SIGNIFICANT CHANGE UP (ref 3.8–10.5)

## 2024-06-25 PROCEDURE — 99233 SBSQ HOSP IP/OBS HIGH 50: CPT | Mod: GC

## 2024-06-25 RX ADMIN — Medication 100 MILLIGRAM(S): at 17:46

## 2024-06-26 ENCOUNTER — TRANSCRIPTION ENCOUNTER (OUTPATIENT)
Age: 54
End: 2024-06-26

## 2024-06-26 LAB
ANION GAP SERPL CALC-SCNC: 13 MMOL/L — SIGNIFICANT CHANGE UP (ref 5–17)
BUN SERPL-MCNC: 11 MG/DL — SIGNIFICANT CHANGE UP (ref 7–23)
CALCIUM SERPL-MCNC: 9.6 MG/DL — SIGNIFICANT CHANGE UP (ref 8.4–10.5)
CHLORIDE SERPL-SCNC: 97 MMOL/L — SIGNIFICANT CHANGE UP (ref 96–108)
CO2 SERPL-SCNC: 23 MMOL/L — SIGNIFICANT CHANGE UP (ref 22–31)
CREAT SERPL-MCNC: 0.35 MG/DL — LOW (ref 0.5–1.3)
CULTURE RESULTS: SIGNIFICANT CHANGE UP
CULTURE RESULTS: SIGNIFICANT CHANGE UP
EGFR: 121 ML/MIN/1.73M2 — SIGNIFICANT CHANGE UP
GLUCOSE SERPL-MCNC: 82 MG/DL — SIGNIFICANT CHANGE UP (ref 70–99)
HCT VFR BLD CALC: 37.4 % — SIGNIFICANT CHANGE UP (ref 34.5–45)
HGB BLD-MCNC: 12.2 G/DL — SIGNIFICANT CHANGE UP (ref 11.5–15.5)
MBP CSF-MCNC: 3.2 NG/ML — SIGNIFICANT CHANGE UP (ref 0–3.7)
MCHC RBC-ENTMCNC: 29 PG — SIGNIFICANT CHANGE UP (ref 27–34)
MCHC RBC-ENTMCNC: 32.6 GM/DL — SIGNIFICANT CHANGE UP (ref 32–36)
MCV RBC AUTO: 89 FL — SIGNIFICANT CHANGE UP (ref 80–100)
NRBC # BLD: 0 /100 WBCS — SIGNIFICANT CHANGE UP (ref 0–0)
PLATELET # BLD AUTO: 204 K/UL — SIGNIFICANT CHANGE UP (ref 150–400)
POTASSIUM SERPL-MCNC: 3.8 MMOL/L — SIGNIFICANT CHANGE UP (ref 3.5–5.3)
POTASSIUM SERPL-SCNC: 3.8 MMOL/L — SIGNIFICANT CHANGE UP (ref 3.5–5.3)
RBC # BLD: 4.2 M/UL — SIGNIFICANT CHANGE UP (ref 3.8–5.2)
RBC # FLD: 12 % — SIGNIFICANT CHANGE UP (ref 10.3–14.5)
SODIUM SERPL-SCNC: 133 MMOL/L — LOW (ref 135–145)
SPECIMEN SOURCE: SIGNIFICANT CHANGE UP
SPECIMEN SOURCE: SIGNIFICANT CHANGE UP
WBC # BLD: 4.87 K/UL — SIGNIFICANT CHANGE UP (ref 3.8–10.5)
WBC # FLD AUTO: 4.87 K/UL — SIGNIFICANT CHANGE UP (ref 3.8–10.5)

## 2024-06-26 PROCEDURE — 99232 SBSQ HOSP IP/OBS MODERATE 35: CPT | Mod: GC

## 2024-06-26 RX ORDER — ZINC OXIDE 20 %
1 OINTMENT (GRAM) TOPICAL
Qty: 0 | Refills: 0 | DISCHARGE
Start: 2024-06-26

## 2024-06-26 RX ORDER — ZINC OXIDE 20 %
1 OINTMENT (GRAM) TOPICAL THREE TIMES A DAY
Refills: 0 | Status: DISCONTINUED | OUTPATIENT
Start: 2024-06-26 | End: 2024-06-27

## 2024-06-26 RX ORDER — POLYETHYLENE GLYCOL 3350 1 G/G
17 POWDER ORAL
Qty: 0 | Refills: 0 | DISCHARGE
Start: 2024-06-26

## 2024-06-26 RX ORDER — ALPRAZOLAM 2 MG/1
1 TABLET ORAL
Qty: 0 | Refills: 0 | DISCHARGE
Start: 2024-06-26

## 2024-06-26 RX ORDER — GABAPENTIN
1 POWDER (GRAM) MISCELLANEOUS
Qty: 0 | Refills: 0 | DISCHARGE
Start: 2024-06-26

## 2024-06-26 RX ADMIN — Medication 100 MILLIGRAM(S): at 17:16

## 2024-06-26 RX ADMIN — POLYETHYLENE GLYCOL 3350 17 GRAM(S): 1 POWDER ORAL at 12:08

## 2024-06-26 RX ADMIN — Medication 2 TABLET(S): at 21:26

## 2024-06-27 ENCOUNTER — TRANSCRIPTION ENCOUNTER (OUTPATIENT)
Age: 54
End: 2024-06-27

## 2024-06-27 VITALS
HEART RATE: 82 BPM | OXYGEN SATURATION: 97 % | TEMPERATURE: 99 F | DIASTOLIC BLOOD PRESSURE: 68 MMHG | SYSTOLIC BLOOD PRESSURE: 117 MMHG | RESPIRATION RATE: 18 BRPM

## 2024-06-27 LAB
ALBUMIN SERPL ELPH-MCNC: 3.8 G/DL — SIGNIFICANT CHANGE UP (ref 3.3–5)
ALP SERPL-CCNC: 59 U/L — SIGNIFICANT CHANGE UP (ref 40–120)
ALT FLD-CCNC: 19 U/L — SIGNIFICANT CHANGE UP (ref 10–45)
ANION GAP SERPL CALC-SCNC: 12 MMOL/L — SIGNIFICANT CHANGE UP (ref 5–17)
AST SERPL-CCNC: 17 U/L — SIGNIFICANT CHANGE UP (ref 10–40)
BILIRUB SERPL-MCNC: 0.3 MG/DL — SIGNIFICANT CHANGE UP (ref 0.2–1.2)
BUN SERPL-MCNC: 14 MG/DL — SIGNIFICANT CHANGE UP (ref 7–23)
CALCIUM SERPL-MCNC: 9.5 MG/DL — SIGNIFICANT CHANGE UP (ref 8.4–10.5)
CHLORIDE SERPL-SCNC: 100 MMOL/L — SIGNIFICANT CHANGE UP (ref 96–108)
CO2 SERPL-SCNC: 24 MMOL/L — SIGNIFICANT CHANGE UP (ref 22–31)
CREAT SERPL-MCNC: 0.36 MG/DL — LOW (ref 0.5–1.3)
CRYPTOC AG CSF-ACNC: NEGATIVE — SIGNIFICANT CHANGE UP
CSF PCR RESULT: SIGNIFICANT CHANGE UP
EGFR: 121 ML/MIN/1.73M2 — SIGNIFICANT CHANGE UP
GLUCOSE SERPL-MCNC: 78 MG/DL — SIGNIFICANT CHANGE UP (ref 70–99)
HCT VFR BLD CALC: 35.5 % — SIGNIFICANT CHANGE UP (ref 34.5–45)
HGB BLD-MCNC: 12.3 G/DL — SIGNIFICANT CHANGE UP (ref 11.5–15.5)
MAGNESIUM SERPL-MCNC: 2.3 MG/DL — SIGNIFICANT CHANGE UP (ref 1.6–2.6)
MCHC RBC-ENTMCNC: 30.3 PG — SIGNIFICANT CHANGE UP (ref 27–34)
MCHC RBC-ENTMCNC: 34.6 GM/DL — SIGNIFICANT CHANGE UP (ref 32–36)
MCV RBC AUTO: 87.4 FL — SIGNIFICANT CHANGE UP (ref 80–100)
NRBC # BLD: 0 /100 WBCS — SIGNIFICANT CHANGE UP (ref 0–0)
PHOSPHATE SERPL-MCNC: 3.9 MG/DL — SIGNIFICANT CHANGE UP (ref 2.5–4.5)
PLATELET # BLD AUTO: 185 K/UL — SIGNIFICANT CHANGE UP (ref 150–400)
POTASSIUM SERPL-MCNC: 3.7 MMOL/L — SIGNIFICANT CHANGE UP (ref 3.5–5.3)
POTASSIUM SERPL-SCNC: 3.7 MMOL/L — SIGNIFICANT CHANGE UP (ref 3.5–5.3)
PROT SERPL-MCNC: 6.8 G/DL — SIGNIFICANT CHANGE UP (ref 6–8.3)
RBC # BLD: 4.06 M/UL — SIGNIFICANT CHANGE UP (ref 3.8–5.2)
RBC # FLD: 11.9 % — SIGNIFICANT CHANGE UP (ref 10.3–14.5)
SODIUM SERPL-SCNC: 136 MMOL/L — SIGNIFICANT CHANGE UP (ref 135–145)
WBC # BLD: 4.95 K/UL — SIGNIFICANT CHANGE UP (ref 3.8–10.5)
WBC # FLD AUTO: 4.95 K/UL — SIGNIFICANT CHANGE UP (ref 3.8–10.5)

## 2024-06-27 PROCEDURE — 86403 PARTICLE AGGLUT ANTBDY SCRN: CPT

## 2024-06-27 PROCEDURE — 82042 OTHER SOURCE ALBUMIN QUAN EA: CPT

## 2024-06-27 PROCEDURE — 81001 URINALYSIS AUTO W/SCOPE: CPT

## 2024-06-27 PROCEDURE — 83615 LACTATE (LD) (LDH) ENZYME: CPT

## 2024-06-27 PROCEDURE — 97530 THERAPEUTIC ACTIVITIES: CPT

## 2024-06-27 PROCEDURE — A9585: CPT

## 2024-06-27 PROCEDURE — 99239 HOSP IP/OBS DSCHRG MGMT >30: CPT

## 2024-06-27 PROCEDURE — 87102 FUNGUS ISOLATION CULTURE: CPT

## 2024-06-27 PROCEDURE — 97166 OT EVAL MOD COMPLEX 45 MIN: CPT

## 2024-06-27 PROCEDURE — 83873 ASSAY OF CSF PROTEIN: CPT

## 2024-06-27 PROCEDURE — 85027 COMPLETE CBC AUTOMATED: CPT

## 2024-06-27 PROCEDURE — 80048 BASIC METABOLIC PNL TOTAL CA: CPT

## 2024-06-27 PROCEDURE — 87799 DETECT AGENT NOS DNA QUANT: CPT

## 2024-06-27 PROCEDURE — 86362 MOG-IGG1 ANTB CBA EACH: CPT

## 2024-06-27 PROCEDURE — 87070 CULTURE OTHR SPECIMN AEROBIC: CPT

## 2024-06-27 PROCEDURE — 86617 LYME DISEASE ANTIBODY: CPT

## 2024-06-27 PROCEDURE — 86789 WEST NILE VIRUS ANTIBODY: CPT

## 2024-06-27 PROCEDURE — 85730 THROMBOPLASTIN TIME PARTIAL: CPT

## 2024-06-27 PROCEDURE — 87186 SC STD MICRODIL/AGAR DIL: CPT

## 2024-06-27 PROCEDURE — 72050 X-RAY EXAM NECK SPINE 4/5VWS: CPT

## 2024-06-27 PROCEDURE — 88108 CYTOPATH CONCENTRATE TECH: CPT

## 2024-06-27 PROCEDURE — 82945 GLUCOSE OTHER FLUID: CPT

## 2024-06-27 PROCEDURE — 97110 THERAPEUTIC EXERCISES: CPT

## 2024-06-27 PROCEDURE — 86341 ISLET CELL ANTIBODY: CPT

## 2024-06-27 PROCEDURE — 87116 MYCOBACTERIA CULTURE: CPT

## 2024-06-27 PROCEDURE — 86592 SYPHILIS TEST NON-TREP QUAL: CPT

## 2024-06-27 PROCEDURE — 83916 OLIGOCLONAL BANDS: CPT

## 2024-06-27 PROCEDURE — 84702 CHORIONIC GONADOTROPIN TEST: CPT

## 2024-06-27 PROCEDURE — 97161 PT EVAL LOW COMPLEX 20 MIN: CPT

## 2024-06-27 PROCEDURE — 85025 COMPLETE CBC W/AUTO DIFF WBC: CPT

## 2024-06-27 PROCEDURE — 89051 BODY FLUID CELL COUNT: CPT

## 2024-06-27 PROCEDURE — 86255 FLUORESCENT ANTIBODY SCREEN: CPT

## 2024-06-27 PROCEDURE — 76498 UNLISTED MR PROCEDURE: CPT

## 2024-06-27 PROCEDURE — 72156 MRI NECK SPINE W/O & W/DYE: CPT | Mod: MC

## 2024-06-27 PROCEDURE — 85610 PROTHROMBIN TIME: CPT

## 2024-06-27 PROCEDURE — 99285 EMERGENCY DEPT VISIT HI MDM: CPT | Mod: 25

## 2024-06-27 PROCEDURE — 72157 MRI CHEST SPINE W/O & W/DYE: CPT | Mod: MC

## 2024-06-27 PROCEDURE — 86788 WEST NILE VIRUS AB IGM: CPT

## 2024-06-27 PROCEDURE — 82164 ANGIOTENSIN I ENZYME TEST: CPT

## 2024-06-27 PROCEDURE — 87086 URINE CULTURE/COLONY COUNT: CPT

## 2024-06-27 PROCEDURE — 82784 ASSAY IGA/IGD/IGG/IGM EACH: CPT

## 2024-06-27 PROCEDURE — 88185 FLOWCYTOMETRY/TC ADD-ON: CPT

## 2024-06-27 PROCEDURE — 88184 FLOWCYTOMETRY/ TC 1 MARKER: CPT

## 2024-06-27 PROCEDURE — 87077 CULTURE AEROBIC IDENTIFY: CPT

## 2024-06-27 PROCEDURE — 83735 ASSAY OF MAGNESIUM: CPT

## 2024-06-27 PROCEDURE — 87483 CNS DNA AMP PROBE TYPE 12-25: CPT

## 2024-06-27 PROCEDURE — 82040 ASSAY OF SERUM ALBUMIN: CPT

## 2024-06-27 PROCEDURE — 84157 ASSAY OF PROTEIN OTHER: CPT

## 2024-06-27 PROCEDURE — 84100 ASSAY OF PHOSPHORUS: CPT

## 2024-06-27 PROCEDURE — 80053 COMPREHEN METABOLIC PANEL: CPT

## 2024-06-27 PROCEDURE — 62328 DX LMBR SPI PNXR W/FLUOR/CT: CPT

## 2024-06-27 PROCEDURE — 87205 SMEAR GRAM STAIN: CPT

## 2024-06-27 RX ORDER — PREGABALIN 225 MG/1
0 CAPSULE ORAL
Qty: 0 | Refills: 0 | DISCHARGE

## 2024-06-27 RX ORDER — CHOLECALCIFEROL (VITAMIN D3) 125 MCG
0 CAPSULE ORAL
Qty: 0 | Refills: 0 | DISCHARGE

## 2024-06-27 RX ORDER — ZINC SULFATE TAB 220 MG (50 MG ZINC EQUIVALENT) 220 (50 ZN) MG
0 TAB ORAL
Qty: 0 | Refills: 0 | DISCHARGE

## 2024-06-27 RX ORDER — ASCORBIC ACID 60 MG
0 TABLET,CHEWABLE ORAL
Qty: 0 | Refills: 0 | DISCHARGE

## 2024-06-28 LAB
ALBUMIN CSF-MCNC: 17.9 MG/DL — SIGNIFICANT CHANGE UP (ref 14–25)
ALBUMIN SERPL ELPH-MCNC: 3520 MG/DL — SIGNIFICANT CHANGE UP (ref 3500–5200)
IGG CSF-MCNC: 2.1 MG/DL — SIGNIFICANT CHANGE UP
IGG FLD-MCNC: 1039 MG/DL — SIGNIFICANT CHANGE UP (ref 610–1660)
IGG SYNTH RATE SER+CSF CALC-MRATE: -5.2 MG/DAY — SIGNIFICANT CHANGE UP
IGG/ALB CLEAR SER+CSF-RTO: 0.4 — SIGNIFICANT CHANGE UP
IGG/ALB CSF: 0.12 RATIO — SIGNIFICANT CHANGE UP
IGG/ALB SER: 0.3 RATIO — SIGNIFICANT CHANGE UP
LYME IGG LINE BLOT INTERP.: NEGATIVE — SIGNIFICANT CHANGE UP
LYME IGM LINE BLOT INTERP.: NEGATIVE — SIGNIFICANT CHANGE UP
OLIGOCLONAL BANDS CSF ELPH-IMP: ABNORMAL
P18 AB. IGG: SIGNIFICANT CHANGE UP
P23 AB. IGG: SIGNIFICANT CHANGE UP
P23 AB. IGM: SIGNIFICANT CHANGE UP
P28 AB. IGG: SIGNIFICANT CHANGE UP
P30 AB. IGG: SIGNIFICANT CHANGE UP
P39 AB. IGG: SIGNIFICANT CHANGE UP
P39 AB. IGM: SIGNIFICANT CHANGE UP
P41 AB. IGG: SIGNIFICANT CHANGE UP
P41 AB. IGM: SIGNIFICANT CHANGE UP
P45 AB. IGG: SIGNIFICANT CHANGE UP
P58 AB. IGG: SIGNIFICANT CHANGE UP
P66 AB. IGG: SIGNIFICANT CHANGE UP
P93 AB. IGG: SIGNIFICANT CHANGE UP

## 2024-07-02 ENCOUNTER — NON-APPOINTMENT (OUTPATIENT)
Age: 54
End: 2024-07-02

## 2024-07-02 DIAGNOSIS — R39.9 UNSPECIFIED SYMPTOMS AND SIGNS INVOLVING THE GENITOURINARY SYSTEM: ICD-10-CM

## 2024-07-02 LAB — MOG AB CSF QL CBA IFA: NEGATIVE — SIGNIFICANT CHANGE UP

## 2024-07-03 ENCOUNTER — NON-APPOINTMENT (OUTPATIENT)
Age: 54
End: 2024-07-03

## 2024-07-04 ENCOUNTER — NON-APPOINTMENT (OUTPATIENT)
Age: 54
End: 2024-07-04

## 2024-07-04 LAB
AMPA-R AB CBA, CSF: NEGATIVE — SIGNIFICANT CHANGE UP
AMPHIPHYSIN AB TITR CSF: NEGATIVE — SIGNIFICANT CHANGE UP
CASPR2-IGG CBA, CSF: NEGATIVE — SIGNIFICANT CHANGE UP
CV2 IGG TITR CSF: NEGATIVE — SIGNIFICANT CHANGE UP
GABA-B-R AB CBA, CSF: NEGATIVE — SIGNIFICANT CHANGE UP
GAD65 AB CSF-SCNC: 0 NMOL/L — SIGNIFICANT CHANGE UP
GFAP IFA, CSF: NEGATIVE — SIGNIFICANT CHANGE UP
GLIAL NUC TYPE 1 AB TITR CSF: NEGATIVE — SIGNIFICANT CHANGE UP
HU1 AB TITR CSF IF: NEGATIVE — SIGNIFICANT CHANGE UP
HU2 AB TITR CSF IF: NEGATIVE — SIGNIFICANT CHANGE UP
HU3 AB TITR CSF: NEGATIVE — SIGNIFICANT CHANGE UP
IFA NOTES: SIGNIFICANT CHANGE UP
IMMUNOLOGIST REVIEW: SIGNIFICANT CHANGE UP
LGI1-IGG CBA, CSF: NEGATIVE — SIGNIFICANT CHANGE UP
MGLUR1 AB IFA, CSF: NEGATIVE — SIGNIFICANT CHANGE UP
PCA-TR AB TITR CSF: NEGATIVE — SIGNIFICANT CHANGE UP
PURKINJE CELL CYTOPLASMIC AB TYPE 2: NEGATIVE — SIGNIFICANT CHANGE UP
PURKINJE CELLS AB TITR CSF IF: NEGATIVE — SIGNIFICANT CHANGE UP

## 2024-07-05 ENCOUNTER — LABORATORY RESULT (OUTPATIENT)
Age: 54
End: 2024-07-05

## 2024-07-08 ENCOUNTER — NON-APPOINTMENT (OUTPATIENT)
Age: 54
End: 2024-07-08

## 2024-07-10 ENCOUNTER — NON-APPOINTMENT (OUTPATIENT)
Age: 54
End: 2024-07-10

## 2024-07-11 ENCOUNTER — TRANSCRIPTION ENCOUNTER (OUTPATIENT)
Age: 54
End: 2024-07-11

## 2024-07-11 ENCOUNTER — NON-APPOINTMENT (OUTPATIENT)
Age: 54
End: 2024-07-11

## 2024-07-12 ENCOUNTER — OUTPATIENT (OUTPATIENT)
Dept: OUTPATIENT SERVICES | Facility: HOSPITAL | Age: 54
LOS: 1 days | End: 2024-07-12
Payer: MEDICARE

## 2024-07-12 ENCOUNTER — APPOINTMENT (OUTPATIENT)
Dept: MRI IMAGING | Facility: IMAGING CENTER | Age: 54
End: 2024-07-12

## 2024-07-12 DIAGNOSIS — M25.511 PAIN IN RIGHT SHOULDER: ICD-10-CM

## 2024-07-12 PROCEDURE — 73221 MRI JOINT UPR EXTREM W/O DYE: CPT | Mod: 26,RT,MH

## 2024-07-12 PROCEDURE — 73221 MRI JOINT UPR EXTREM W/O DYE: CPT

## 2024-07-15 ENCOUNTER — TRANSCRIPTION ENCOUNTER (OUTPATIENT)
Age: 54
End: 2024-07-15

## 2024-07-18 ENCOUNTER — APPOINTMENT (OUTPATIENT)
Age: 54
End: 2024-07-18
Payer: MEDICARE

## 2024-07-18 ENCOUNTER — TRANSCRIPTION ENCOUNTER (OUTPATIENT)
Age: 54
End: 2024-07-18

## 2024-07-18 VITALS
BODY MASS INDEX: 26.21 KG/M2 | DIASTOLIC BLOOD PRESSURE: 71 MMHG | WEIGHT: 130 LBS | HEART RATE: 85 BPM | HEIGHT: 59 IN | SYSTOLIC BLOOD PRESSURE: 121 MMHG

## 2024-07-18 DIAGNOSIS — G95.89 OTHER SPECIFIED DISEASES OF SPINAL CORD: ICD-10-CM

## 2024-07-18 DIAGNOSIS — G95.9 DISEASE OF SPINAL CORD, UNSPECIFIED: ICD-10-CM

## 2024-07-18 PROCEDURE — 99215 OFFICE O/P EST HI 40 MIN: CPT

## 2024-07-18 RX ORDER — AMOXICILLIN 500 MG/1
500 CAPSULE ORAL
Qty: 21 | Refills: 1 | Status: ACTIVE | COMMUNITY
Start: 2024-07-18 | End: 1900-01-01

## 2024-07-19 ENCOUNTER — TRANSCRIPTION ENCOUNTER (OUTPATIENT)
Age: 54
End: 2024-07-19

## 2024-07-20 LAB
CULTURE RESULTS: SIGNIFICANT CHANGE UP
SPECIMEN SOURCE: SIGNIFICANT CHANGE UP

## 2024-07-22 ENCOUNTER — NON-APPOINTMENT (OUTPATIENT)
Age: 54
End: 2024-07-22

## 2024-07-22 ENCOUNTER — TRANSCRIPTION ENCOUNTER (OUTPATIENT)
Age: 54
End: 2024-07-22

## 2024-07-23 ENCOUNTER — TRANSCRIPTION ENCOUNTER (OUTPATIENT)
Age: 54
End: 2024-07-23

## 2024-07-24 ENCOUNTER — APPOINTMENT (OUTPATIENT)
Dept: NEUROLOGY | Facility: CLINIC | Age: 54
End: 2024-07-24

## 2024-08-05 ENCOUNTER — NON-APPOINTMENT (OUTPATIENT)
Age: 54
End: 2024-08-05

## 2024-08-07 ENCOUNTER — LABORATORY RESULT (OUTPATIENT)
Age: 54
End: 2024-08-07

## 2024-08-12 ENCOUNTER — NON-APPOINTMENT (OUTPATIENT)
Age: 54
End: 2024-08-12

## 2024-08-12 ENCOUNTER — TRANSCRIPTION ENCOUNTER (OUTPATIENT)
Age: 54
End: 2024-08-12

## 2024-08-13 ENCOUNTER — TRANSCRIPTION ENCOUNTER (OUTPATIENT)
Age: 54
End: 2024-08-13

## 2024-08-13 RX ORDER — CEPHALEXIN 250 MG/1
250 TABLET ORAL EVERY 8 HOURS
Qty: 21 | Refills: 0 | Status: DISCONTINUED | COMMUNITY
Start: 2024-08-12 | End: 2024-08-13

## 2024-08-13 RX ORDER — CEPHALEXIN 500 MG/1
500 CAPSULE ORAL
Qty: 14 | Refills: 0 | Status: ACTIVE | COMMUNITY
Start: 2024-08-13 | End: 1900-01-01

## 2024-08-14 ENCOUNTER — APPOINTMENT (OUTPATIENT)
Dept: OBGYN | Facility: CLINIC | Age: 54
End: 2024-08-14
Payer: MEDICARE

## 2024-08-14 ENCOUNTER — APPOINTMENT (OUTPATIENT)
Dept: OBGYN | Facility: CLINIC | Age: 54
End: 2024-08-14

## 2024-08-14 VITALS
HEIGHT: 59 IN | SYSTOLIC BLOOD PRESSURE: 111 MMHG | BODY MASS INDEX: 26.21 KG/M2 | WEIGHT: 130 LBS | DIASTOLIC BLOOD PRESSURE: 71 MMHG

## 2024-08-14 DIAGNOSIS — N75.0 CYST OF BARTHOLIN'S GLAND: ICD-10-CM

## 2024-08-14 PROCEDURE — 56420 I&D BARTHOLINS GLAND ABSCESS: CPT

## 2024-08-14 NOTE — HISTORY OF PRESENT ILLNESS
[FreeTextEntry1] : 54 year old female presents with concern for Bartholin's abscess, which she has had in the past.  Hx recurrent UTI, was previously on ppx, diagnosed recently with UTI and started Keflex. Last seen for annual exam Nov 2023. H/o Neurogenic bladder and urinary retention.  She reports development of large painful R vulvovaginal mass last Wednesday that partialy drained over the weekend.  She has been doing sitz baths.  Recent +ucx from 08/07/24

## 2024-08-14 NOTE — END OF VISIT
[FreeTextEntry3] : I, Modesta Howie, acted as a scribe on behalf of Dr. Joann Sanford M.D. on 08/14/2024.  All medical entries made by the scribe were at my, Dr. Joann Sanford M.D., direction and personally dictated by me on 08/14/2024. I have reviewed the chart and agree that the record accurately reflects my personal performance of the history, physical exam, assessment and plan. I have also personally directed, reviewed, and agreed with the chart.

## 2024-08-14 NOTE — PHYSICAL EXAM
[Appropriately responsive] : appropriately responsive [Alert] : alert [No Acute Distress] : no acute distress [Soft] : soft [Non-tender] : non-tender [Non-distended] : non-distended [No HSM] : No HSM [No Lesions] : no lesions [No Mass] : no mass [Oriented x3] : oriented x3 [Labia Majora] : normal [Labia Minora] : normal [Bartholin's Gland] : the right Bartholin's gland was normal [Normal] : normal [Uterine Adnexae] : normal [Vulvar Mass ___ cm] : a [unfilled] ~Ucm vulvar mass

## 2024-08-14 NOTE — PROCEDURE
[I & D] : I&D [Time out performed] : Pre-procedure time out performed.  Patient's name, date of birth and procedure confirmed [Consent obtained] : Consent obtained [Right] : right [Bartholin's Cyst] : Bartholin's cyst [Size: ___cm] : Cyst is ~Vcm [____% Lidocaine w/Epi] : [unfilled]% Lidocaine with Epi [Bloody Fluid] : bloody fluid [Tolerated Well] : The patient tolerated the procedure well [No Complications] : There were no complications [de-identified] : minimal drainage.  loculations broken up with mosquito clamp.  edges of incision oversewn and cauterized with AgNO3

## 2024-08-14 NOTE — PLAN
[FreeTextEntry1] : 54 year old female presents with right bartholins cyst  -Bartholin's cyst, incision and drainage performed -continue sitz baths -continue Keflex prescribed by ID

## 2024-08-20 ENCOUNTER — NON-APPOINTMENT (OUTPATIENT)
Age: 54
End: 2024-08-20

## 2024-08-20 ENCOUNTER — TRANSCRIPTION ENCOUNTER (OUTPATIENT)
Age: 54
End: 2024-08-20

## 2024-08-21 ENCOUNTER — TRANSCRIPTION ENCOUNTER (OUTPATIENT)
Age: 54
End: 2024-08-21

## 2024-08-21 ENCOUNTER — APPOINTMENT (OUTPATIENT)
Dept: NEUROSURGERY | Facility: CLINIC | Age: 54
End: 2024-08-21
Payer: MEDICARE

## 2024-08-21 VITALS
WEIGHT: 130 LBS | HEIGHT: 59 IN | DIASTOLIC BLOOD PRESSURE: 72 MMHG | OXYGEN SATURATION: 96 % | BODY MASS INDEX: 26.21 KG/M2 | SYSTOLIC BLOOD PRESSURE: 103 MMHG | HEART RATE: 89 BPM

## 2024-08-21 DIAGNOSIS — G95.89 OTHER SPECIFIED DISEASES OF SPINAL CORD: ICD-10-CM

## 2024-08-21 PROCEDURE — 99215 OFFICE O/P EST HI 40 MIN: CPT

## 2024-08-21 RX ORDER — CEPHALEXIN 250 MG/1
250 CAPSULE ORAL DAILY
Qty: 30 | Refills: 3 | Status: ACTIVE | COMMUNITY
Start: 2024-08-21 | End: 1900-01-01

## 2024-08-25 NOTE — HISTORY OF PRESENT ILLNESS
[< 3 months] : less than 3 months [FreeTextEntry1] : upper extremity weakness and neck pain [de-identified] : Ms. MELANI BROTHERS is a 53 year-old- woman presenting with a PMHx CP since birth, Wheelchair dependent, IBS, Neurogenic bladder who presents for comprehensive neurosurgical evaluation of cervical spine cervical myelopathy. Today, the pt reports that symptoms started 5- 6 weeks ago with worsening upper extremity weakness and newer onset of weakness in her legs which is worse from her baseline. She receives 12 hours of home care services during the day and none at night. She is concerned about her bilateral upper extremity weakness could cause her to lose her remaining function. She reports bilateral neck pain, R>L. She reports that she is fearful of medication for pain that might interfere with her medication regimen. She comes today to discuss her MRI findings.

## 2024-08-25 NOTE — ASSESSMENT
[FreeTextEntry1] : Ms. MELANI BROTHERS is a 53 year- old- woman who is presents with cervical Myelomalacia Imaging and diagnostic workup evaluation show evidence of cervical myelomalacia.  She was evaluated by Dr. Gonzales and recommendation for Dynamic Cervical MRI was recommended to assess for Spinal cord stretch syndrome.   Dynamic Cervical MRI in flexion and extension views   Plan:  .    Please see Dr. Simon's dictation for details. I, Dr. Carrie Simon evaluated the patient with the nurse practitioner [place] and established the plan of care. I discussed this patient with the nurse practitioner during the visit. I agree with the assessment and plan as written unless noted below.

## 2024-08-25 NOTE — HISTORY OF PRESENT ILLNESS
[< 3 months] : less than 3 months [FreeTextEntry1] : upper extremity weakness and neck pain [de-identified] : Ms. MELANI BROTHERS is a 53 year-old- woman presenting with a PMHx CP since birth, Wheelchair dependent, IBS, Neurogenic bladder who presents for comprehensive neurosurgical evaluation of cervical spine cervical myelopathy. Today, the pt reports that symptoms started 5- 6 weeks ago with worsening upper extremity weakness and newer onset of weakness in her legs which is worse from her baseline. She receives 12 hours of home care services during the day and none at night. She is concerned about her bilateral upper extremity weakness could cause her to lose her remaining function. She reports bilateral neck pain, R>L. She reports that she is fearful of medication for pain that might interfere with her medication regimen. She comes today to discuss her MRI findings.

## 2024-08-25 NOTE — HISTORY OF PRESENT ILLNESS
[< 3 months] : less than 3 months [FreeTextEntry1] : upper extremity weakness and neck pain [de-identified] : Ms. MELANI BROTHERS is a 53 year-old- woman presenting with a PMHx CP since birth, Wheelchair dependent, IBS, Neurogenic bladder who presents for comprehensive neurosurgical evaluation of cervical spine cervical myelopathy. Today, the pt reports that symptoms started 5- 6 weeks ago with worsening upper extremity weakness and newer onset of weakness in her legs which is worse from her baseline. She receives 12 hours of home care services during the day and none at night. She is concerned about her bilateral upper extremity weakness could cause her to lose her remaining function. She reports bilateral neck pain, R>L. She reports that she is fearful of medication for pain that might interfere with her medication regimen. She comes today to discuss her MRI findings.

## 2024-08-25 NOTE — REASON FOR VISIT
[Follow-Up: _____] : a [unfilled] follow-up visit [FreeTextEntry1] : MELANI BROTHERS  is an 54 year who returns to the office for a follow-up visit and review of the diagnostic workup. Today Ms. BROTHERS  is concerned about Her Dynamic Flexion Extension views MRI cervical results. She continue t participate in PT

## 2024-08-25 NOTE — REVIEW OF SYSTEMS
[As Noted in HPI] : as noted in HPI [Arm Weakness] : arm weakness [Hand Weakness] :  hand weakness [Leg Weakness] : leg weakness [Inability to Walk] : inability to walk [de-identified] : Wheelchair dependent

## 2024-08-25 NOTE — HISTORY OF PRESENT ILLNESS
[< 3 months] : less than 3 months [FreeTextEntry1] : upper extremity weakness and neck pain [de-identified] : Ms. MELANI BROTHERS is a 53 year-old- woman presenting with a PMHx CP since birth, Wheelchair dependent, IBS, Neurogenic bladder who presents for comprehensive neurosurgical evaluation of cervical spine cervical myelopathy. Today, the pt reports that symptoms started 5- 6 weeks ago with worsening upper extremity weakness and newer onset of weakness in her legs which is worse from her baseline. She receives 12 hours of home care services during the day and none at night. She is concerned about her bilateral upper extremity weakness could cause her to lose her remaining function. She reports bilateral neck pain, R>L. She reports that she is fearful of medication for pain that might interfere with her medication regimen. She comes today to discuss her MRI findings.

## 2024-08-25 NOTE — REVIEW OF SYSTEMS
[As Noted in HPI] : as noted in HPI [Arm Weakness] : arm weakness [Hand Weakness] :  hand weakness [Leg Weakness] : leg weakness [Inability to Walk] : inability to walk [de-identified] : Wheelchair dependent

## 2024-08-25 NOTE — REVIEW OF SYSTEMS
[As Noted in HPI] : as noted in HPI [Arm Weakness] : arm weakness [Hand Weakness] :  hand weakness [Leg Weakness] : leg weakness [Inability to Walk] : inability to walk [de-identified] : Wheelchair dependent

## 2024-08-25 NOTE — REVIEW OF SYSTEMS
[As Noted in HPI] : as noted in HPI [Arm Weakness] : arm weakness [Hand Weakness] :  hand weakness [Leg Weakness] : leg weakness [Inability to Walk] : inability to walk [de-identified] : Wheelchair dependent

## 2024-08-29 ENCOUNTER — APPOINTMENT (OUTPATIENT)
Dept: PHYSICAL MEDICINE AND REHAB | Facility: CLINIC | Age: 54
End: 2024-08-29
Payer: MEDICARE

## 2024-08-29 ENCOUNTER — APPOINTMENT (OUTPATIENT)
Dept: UROLOGY | Facility: CLINIC | Age: 54
End: 2024-08-29
Payer: MEDICARE

## 2024-08-29 VITALS — DIASTOLIC BLOOD PRESSURE: 74 MMHG | SYSTOLIC BLOOD PRESSURE: 112 MMHG | HEART RATE: 91 BPM | RESPIRATION RATE: 19 BRPM

## 2024-08-29 DIAGNOSIS — G89.29 PAIN IN RIGHT SHOULDER: ICD-10-CM

## 2024-08-29 DIAGNOSIS — Z78.9 OTHER SPECIFIED HEALTH STATUS: ICD-10-CM

## 2024-08-29 DIAGNOSIS — R32 UNSPECIFIED URINARY INCONTINENCE: ICD-10-CM

## 2024-08-29 DIAGNOSIS — N31.9 NEUROMUSCULAR DYSFUNCTION OF BLADDER, UNSPECIFIED: ICD-10-CM

## 2024-08-29 DIAGNOSIS — M25.511 PAIN IN RIGHT SHOULDER: ICD-10-CM

## 2024-08-29 DIAGNOSIS — R39.9 UNSPECIFIED SYMPTOMS AND SIGNS INVOLVING THE GENITOURINARY SYSTEM: ICD-10-CM

## 2024-08-29 PROCEDURE — 99213 OFFICE O/P EST LOW 20 MIN: CPT

## 2024-08-29 PROCEDURE — 99215 OFFICE O/P EST HI 40 MIN: CPT

## 2024-08-29 PROCEDURE — G2211 COMPLEX E/M VISIT ADD ON: CPT

## 2024-08-29 NOTE — PHYSICAL EXAM
[FreeTextEntry1] : General exam   Constitutional: The patient appears well-developed, well-nourished, and in no apparent distress. Patient is well-groomed.    Skin: The skin is warm and dry, with normal turgor.  Eyes: PERRL.    ENMT: Ears: Hearing is grossly within normal limits.    Neck: Supple: The neck is supple, in cervical collar  Respiratory: Inspection: Breathing unlabored.    Neurologic: Alert and oriented x 3.   Psychiatric: Patient is cooperative and appropriate.  Mood and affect are normal.  Patient's insight is good, and memory and judgment are intact.  Right shoulder Skin c/d/i without any erythema, swelling + generalized effusion  Diffuse tenderness Limited A/PROM in all directions

## 2024-08-29 NOTE — ASSESSMENT
[FreeTextEntry1] : Ms. MELANI BROTHERS is a 54 year F with pain in both shoulders RIGHT > LEFT. She reports chronic long standing pain and is noting an acute on chronic exacerbation of this pain due to glenohumeral degenerative joint disease with subdeltoid bursitis and adhesive capsulitis.  Patient reassured and educated on the diagnosis and treatment options. Risks and benefits of treatment and of delaying treatment discussed with patient. Risks discussed include but not limited to: progression of symptoms, worsening pain and functional status, etc.  This note was generated using Dragon medical dictation software. A reasonable effort had been made for proofreading its contents, but spelling mistakes or grammatical errors may still remain. If there are any questions or points of clarification needed please notify my office.  NSX notes reviewed Chart reviewed Continue work up with Neurology and NSX Continue follow up with Dr. Nava MRI Right shoulder reviewed Schedule for RIGHT shoulder ULT guided steroid injection in 2 weeks Risks and benefits of having injection discussed with patient. Risks discussed included, but not limited to: pain, infection, bleeding requiring emergency surgery, headaches, damage to vital organs, etc.  Patient was advised if the following symptoms develop: chills, fever, loss of bladder control, bowel incontinence or urinary retention, numbness/tingling or weakness is present in upper or lower extremities, to go to the nearest emergency room. This may be a new clinical condition not present at the time of the patient visit that may lead to paralysis and/or death. Patient advised if the above symptoms developed to also call the office immediately to inform us and to go to the nearest emergency room.

## 2024-08-29 NOTE — HISTORY OF PRESENT ILLNESS
[FreeTextEntry1] : MELANI BROTHERS is here for follow up. Since last visit she had set backs in her health. She is now under the care of NSX. From 8/21/24 note by CASEY RICHARDS NP, "Ms. MELANI BROTHERS is a 53 year- old- woman who is presents with cervical Myelomalacia Imaging and diagnostic workup evaluation show evidence of cervical myelomalacia with possibility of spinal cord stretch. There is no evidence of spinal cord compression. Surgery may not be helpful. at this time due to spinal cord damage for unclear reasons. Her genetic testing is pending Imaging: Cervical MRI with no significant change since from 2013 to now 2024; She has atrophy-thinning of the spinal cord MRI STand up Cervical no Dynamic Instability"  She comes in today asking for RIGHT shoulder injection. She is having severe pains and limitation in ROM. She was sent for MRI by Dr. Nava from PMR.   Denies any new pain, numbness or weakness, bowel/bladder dysfunction, saddle anesthesia, fevers, chills, weight loss, night pain, or night sweats at this time.
No

## 2024-08-30 NOTE — HISTORY OF PRESENT ILLNESS
[FreeTextEntry1] : 53 year old F w hx of CP, tethered cord,  CIC BID. 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

## 2024-08-30 NOTE — PHYSICAL EXAM
[Normal Appearance] : normal appearance [Well Groomed] : well groomed [General Appearance - In No Acute Distress] : no acute distress [] : no respiratory distress [Skin Color & Pigmentation] : normal skin color and pigmentation [Affect] : the affect was normal [Mood] : the mood was normal

## 2024-08-30 NOTE — ASSESSMENT
[FreeTextEntry1] : ellura - counseled pt on constipation less likely as a side effect likely   vaginal probiotics  cont keflex ppx  will see ID next week    CIC - increase to TID if vol > 500 cc  rbus  - for Oct 2024     we also discussed rba of SP tube & mitrofanoff

## 2024-08-30 NOTE — END OF VISIT
[FreeTextEntry3] : The total time spent with the patient includes face to face time as well as time for documentation, ordering medications/labs/procedures, and care coordination, but I acknowledge it does not include time spent on any procedures performed (eg PVR, UDS, Cystoscopy, catheter changes, etc).  Time includes reviewing the chart prior to visit, documentation, and correspondence. [Time Spent: ___ minutes] : I have spent [unfilled] minutes of time on the encounter which excludes teaching and separately reported services. 2 seconds or less

## 2024-08-30 NOTE — END OF VISIT
[FreeTextEntry3] : The total time spent with the patient includes face to face time as well as time for documentation, ordering medications/labs/procedures, and care coordination, but I acknowledge it does not include time spent on any procedures performed (eg PVR, UDS, Cystoscopy, catheter changes, etc).  Time includes reviewing the chart prior to visit, documentation, and correspondence. [Time Spent: ___ minutes] : I have spent [unfilled] minutes of time on the encounter which excludes teaching and separately reported services.

## 2024-09-04 ENCOUNTER — APPOINTMENT (OUTPATIENT)
Dept: INFECTIOUS DISEASE | Facility: CLINIC | Age: 54
End: 2024-09-04
Payer: MEDICARE

## 2024-09-04 VITALS — HEART RATE: 73 BPM | SYSTOLIC BLOOD PRESSURE: 105 MMHG | DIASTOLIC BLOOD PRESSURE: 60 MMHG | OXYGEN SATURATION: 98 %

## 2024-09-04 DIAGNOSIS — N39.0 URINARY TRACT INFECTION, SITE NOT SPECIFIED: ICD-10-CM

## 2024-09-04 DIAGNOSIS — Z00.00 ENCOUNTER FOR GENERAL ADULT MEDICAL EXAMINATION W/OUT ABNORMAL FINDINGS: ICD-10-CM

## 2024-09-04 PROCEDURE — 99213 OFFICE O/P EST LOW 20 MIN: CPT

## 2024-09-04 NOTE — HISTORY OF PRESENT ILLNESS
[FreeTextEntry1] : Patient currently asymptomatic from  perspective on low-dose Keflex Brought in urine for testing even though has not S/Sx of UTI Discussed with patient potential benefits of rotating antibiotics, but unable to find suitable other agents because of history of side effects: [place] - N/V Fosfomycin Diarrhea TMP/SMX - body pain  Saw  - recommended vaginal probiotics and ellura.  Also wants to discuss vaccination (flu, COVID) in light of recent neurological issues which began about 1 month after receiving her last influenza vaccine. MRI findings consistent with myelomalacia, not inflammatory changes seen on MRI.

## 2024-09-04 NOTE — ASSESSMENT
[FreeTextEntry1] : Extensive discussion re: concerns for breakthrough with single-agent prophylaxis but no alternatives available given allergies and history of intolerances. Is not eager to try omadacycline as agent and pharmacist had cautioned her that no studies on longer-term use than short-courses for UTI  Castillo check today's urine at patient request, but cautioned re: interpretation of significance of any findings given history of asymptomatic bacteriuria.   Also extensive discussion re: Flu shot / Covid shot. Advised that timeline from vaccination to neurologic issues was slightly longer than one might expect for vaccine complication; also, known sequelae of vaccination are typically inflammatory in nature which was not what was seen on her MRI when she was evaluated. Patient is frequently exposed to a variety of aides and etc. who come to her house, often while sick. Advised that the neurologic event is less likely from the vaccine and, on balance, worth taking the influenza vaccine again this year and to not bundle with other vaccines (e.g. COVID) in an abundance of caution.

## 2024-09-06 ENCOUNTER — APPOINTMENT (OUTPATIENT)
Dept: INTERNAL MEDICINE | Facility: CLINIC | Age: 54
End: 2024-09-06

## 2024-09-06 ENCOUNTER — OUTPATIENT (OUTPATIENT)
Dept: OUTPATIENT SERVICES | Facility: HOSPITAL | Age: 54
LOS: 1 days | End: 2024-09-06
Payer: MEDICARE

## 2024-09-06 VITALS
HEART RATE: 90 BPM | DIASTOLIC BLOOD PRESSURE: 74 MMHG | HEIGHT: 59 IN | OXYGEN SATURATION: 97 % | SYSTOLIC BLOOD PRESSURE: 110 MMHG

## 2024-09-06 DIAGNOSIS — M47.12 OTHER SPONDYLOSIS WITH MYELOPATHY, CERVICAL REGION: ICD-10-CM

## 2024-09-06 DIAGNOSIS — I10 ESSENTIAL (PRIMARY) HYPERTENSION: ICD-10-CM

## 2024-09-06 DIAGNOSIS — M47.22 OTHER SPONDYLOSIS WITH MYELOPATHY, CERVICAL REGION: ICD-10-CM

## 2024-09-06 DIAGNOSIS — M47.22 OTHER SPONDYLOSIS WITH RADICULOPATHY, CERVICAL REGION: ICD-10-CM

## 2024-09-06 PROCEDURE — G0463: CPT

## 2024-09-06 PROCEDURE — 99214 OFFICE O/P EST MOD 30 MIN: CPT | Mod: GC

## 2024-09-06 NOTE — PHYSICAL EXAM
[No Acute Distress] : no acute distress [Well Nourished] : well nourished [Well Developed] : well developed [Well-Appearing] : well-appearing [Normal Sclera/Conjunctiva] : normal sclera/conjunctiva [PERRL] : pupils equal round and reactive to light [EOMI] : extraocular movements intact [Normal Outer Ear/Nose] : the outer ears and nose were normal in appearance [Normal Oropharynx] : the oropharynx was normal [No JVD] : no jugular venous distention [No Lymphadenopathy] : no lymphadenopathy [Supple] : supple [Thyroid Normal, No Nodules] : the thyroid was normal and there were no nodules present [No Respiratory Distress] : no respiratory distress  [No Accessory Muscle Use] : no accessory muscle use [Clear to Auscultation] : lungs were clear to auscultation bilaterally [Normal Rate] : normal rate  [Regular Rhythm] : with a regular rhythm [Normal S1, S2] : normal S1 and S2 [No Murmur] : no murmur heard [No Carotid Bruits] : no carotid bruits [No Abdominal Bruit] : a ~M bruit was not heard ~T in the abdomen [No Varicosities] : no varicosities [Pedal Pulses Present] : the pedal pulses are present [No Edema] : there was no peripheral edema [No Palpable Aorta] : no palpable aorta [No Extremity Clubbing/Cyanosis] : no extremity clubbing/cyanosis [Soft] : abdomen soft [Non Tender] : non-tender [Non-distended] : non-distended [No Masses] : no abdominal mass palpated [No HSM] : no HSM [Normal Bowel Sounds] : normal bowel sounds [Normal Posterior Cervical Nodes] : no posterior cervical lymphadenopathy [Normal Anterior Cervical Nodes] : no anterior cervical lymphadenopathy [No CVA Tenderness] : no CVA  tenderness [No Spinal Tenderness] : no spinal tenderness [No Joint Swelling] : no joint swelling [No Rash] : no rash [Coordination Grossly Intact] : coordination grossly intact [No Focal Deficits] : no focal deficits [Normal Gait] : normal gait [Deep Tendon Reflexes (DTR)] : deep tendon reflexes were 2+ and symmetric [Normal Affect] : the affect was normal [Normal Insight/Judgement] : insight and judgment were intact [de-identified] : baseline contractures, in electric wheelchair.

## 2024-09-06 NOTE — HISTORY OF PRESENT ILLNESS
[de-identified] : 54 year old woman w/ hx of CP, referred for cervical myelopathy. She has been seen by ID, Uro, PMR, Neuro, and NeuroSx. Feels at baseline physically. Had questions about genetic testing and affordability. Also with need to have letter of medical necessity signed. Per neuro order, she was advised to receive testing for inherited spastic paraplegia and was advised to have genetic testing sent to [facility]. Patient states insurance has advised her that they would not be paying for her genetic testing.  Denies cp, fevers, chills. Baseline neurologic deficits.

## 2024-09-10 ENCOUNTER — TRANSCRIPTION ENCOUNTER (OUTPATIENT)
Age: 54
End: 2024-09-10

## 2024-09-10 LAB
APPEARANCE: CLEAR
BACTERIA UR CULT: NORMAL
BACTERIA: NEGATIVE /HPF
BILIRUBIN URINE: NEGATIVE
BLOOD URINE: NEGATIVE
CAST: 0 /LPF
COLOR: YELLOW
EPITHELIAL CELLS: 0 /HPF
GLUCOSE QUALITATIVE U: NEGATIVE MG/DL
KETONES URINE: NEGATIVE MG/DL
LEUKOCYTE ESTERASE URINE: NEGATIVE
MICROSCOPIC-UA: NORMAL
NITRITE URINE: NEGATIVE
PH URINE: 5.5
PROTEIN URINE: NEGATIVE MG/DL
RED BLOOD CELLS URINE: 0 /HPF
SPECIFIC GRAVITY URINE: 1.02
UROBILINOGEN URINE: 0.2 MG/DL
WHITE BLOOD CELLS URINE: 0 /HPF

## 2024-09-12 ENCOUNTER — NON-APPOINTMENT (OUTPATIENT)
Age: 54
End: 2024-09-12

## 2024-09-12 ENCOUNTER — APPOINTMENT (OUTPATIENT)
Dept: OPHTHALMOLOGY | Facility: CLINIC | Age: 54
End: 2024-09-12
Payer: MEDICARE

## 2024-09-12 PROCEDURE — 92014 COMPRE OPH EXAM EST PT 1/>: CPT

## 2024-09-12 PROCEDURE — 92083 EXTENDED VISUAL FIELD XM: CPT

## 2024-09-18 ENCOUNTER — APPOINTMENT (OUTPATIENT)
Dept: OBGYN | Facility: CLINIC | Age: 54
End: 2024-09-18

## 2024-09-18 VITALS
HEIGHT: 59 IN | WEIGHT: 130 LBS | SYSTOLIC BLOOD PRESSURE: 124 MMHG | DIASTOLIC BLOOD PRESSURE: 80 MMHG | BODY MASS INDEX: 26.21 KG/M2

## 2024-09-18 DIAGNOSIS — N89.8 OTHER SPECIFIED NONINFLAMMATORY DISORDERS OF VAGINA: ICD-10-CM

## 2024-09-18 PROCEDURE — 99213 OFFICE O/P EST LOW 20 MIN: CPT

## 2024-09-24 LAB — CYTOLOGY CVX/VAG DOC THIN PREP: NORMAL

## 2024-09-28 LAB
BACTERIA UR CULT: NORMAL
BV BACTERIA RRNA VAG QL NAA+PROBE: NOT DETECTED
C GLABRATA RNA VAG QL NAA+PROBE: NOT DETECTED
CANDIDA RRNA VAG QL PROBE: NOT DETECTED
HPV HIGH+LOW RISK DNA PNL CVX: NOT DETECTED
T VAGINALIS RRNA SPEC QL NAA+PROBE: NOT DETECTED

## 2024-09-28 NOTE — PLAN
[FreeTextEntry1] : 53 yo for vaginal discharge.  Irregular discharge -f/u urine culture -f/u vaginal culture -pap  Fibroid uterus -declines hysterectomy  Endo cervical polyp -rto for biopsy as pt does not wish to have excision  Health care maintenance -vit D/exercise -breast mammo/sono -declines breast exam at this time -colon screening reviewed -f/u PCP for annual and appropriate immunizations

## 2024-09-28 NOTE — END OF VISIT
[FreeTextEntry3] :  I, Alejandra De La Cruz, acted as a scribe on behalf of Dr. Hernandez Abdalla M.D. on 09/18/2024.   All medical entries made by the scribe were at my Dr. Hernandez Abdalla M.D direction and personally dictated by me on 09/18/2024. I have reviewed the chart and agree that the record accurately reflects my personal performance of the history, physical exam, assessment and plan. I have also personally directed, reviewed, and agreed with the chart.

## 2024-09-28 NOTE — HISTORY OF PRESENT ILLNESS
[FreeTextEntry1] : 54 year old presents complaining of vaginal discharge. She was last seen 1 month ago for an I&D of a Bartholin cyst. Pt with extensive h/o fibroids as well as neurogenic bladder, etc.  Pelvis MRI (11/30/2023):  UTERUS: Measures 10.0 x 5.9 x 6.0 cm in size exclusive of pedunculated myomas the endocervical canal is slightly distended measuring 1.7 cm in size of unclear etiology. On series 4 image 30 there appears be some soft tissue extending into the endocervical canal questioning the possibility of a cervical polyp or a poorly myoma 4.6 x 3.9 x 4.2 cm left body myoma C in the endometrium to the right. The left pedunculated myoma measures 10.4 x 10.8 x 13.4 cm myoma.  ENDOMETRIUM: Right pedunculated fundal myoma measures 11.2 x 8.6 x 7.2 cm JUNCTIONAL ZONE: Not identified  RIGHT OVARY: Not well visualized LEFT OVARY: 4.1 x 3.3 x 3.0 cm ADNEXA: Within normal limits.  BLADDER: Within normal limits.  LYMPH NODES: No pelvic lymphadenopathy.  VISUALIZED PORTIONS:  ABDOMINAL ORGANS: Within normal limits. BOWEL: Within normal limits. PERITONEUM: No ascites. VESSELS: Within normal limits. ABDOMINAL WALL: Within normal limits. Bilateral Bartholin gland cyst BONES: Severe osteoarthritis left hip  IMPRESSION: Large pedunculated myomas as well as intramural myomas.  Slight distention of the endocervical canal of unclear etiology possibility of a cervical polyp or an aborting myoma is in the differential

## 2024-09-28 NOTE — PHYSICAL EXAM
[Appropriately responsive] : appropriately responsive [Alert] : alert [No Acute Distress] : no acute distress [Oriented x3] : oriented x3 [Labia Majora] : normal [Labia Minora] : normal [Normal] : normal [Uterine Adnexae] : normal [Soft] : soft [Non-tender] : non-tender [Non-distended] : non-distended [No HSM] : No HSM [No Lesions] : no lesions [No Mass] : no mass [FreeTextEntry6] : 20+ week fibroid uterus, does not appear changed. Endocervical polyp noted. Pt declines attempted removal.

## 2024-10-03 ENCOUNTER — APPOINTMENT (OUTPATIENT)
Dept: PHYSICAL MEDICINE AND REHAB | Facility: CLINIC | Age: 54
End: 2024-10-03
Payer: MEDICARE

## 2024-10-03 DIAGNOSIS — M25.511 PAIN IN RIGHT SHOULDER: ICD-10-CM

## 2024-10-03 DIAGNOSIS — G89.29 PAIN IN RIGHT SHOULDER: ICD-10-CM

## 2024-10-03 PROCEDURE — 20611 DRAIN/INJ JOINT/BURSA W/US: CPT | Mod: RT

## 2024-10-03 RX ORDER — METHYLPRED ACET/NACL,ISO-OS/PF 80 MG/ML
80 VIAL (ML) INJECTION
Refills: 0 | Status: COMPLETED | OUTPATIENT
Start: 2024-10-03

## 2024-10-03 RX ADMIN — METHYLPREDNISOLONE ACETATE 0 MG/ML: 80 INJECTION, SUSPENSION INTRA-ARTICULAR; INTRALESIONAL; INTRAMUSCULAR; SOFT TISSUE at 00:00

## 2024-10-03 NOTE — PROCEDURE
[de-identified] : PROCEDURE: RIGHT Subacromial/subdeltoid bursa (SASDB) corticosteroid injection with ultrasound guidance   Patient:  MELANI BROTHERS  MRN #: [id number]  : May 24 1970                 					 Date: 10/03/2024      Injectate: 80mg/mL methylPrednisolone ([id number]; EXP 2026; LOT [id number]) 1mL and  0.25% Bupivacaine ([id number] [id number]; EXP 2025, NOV 30; LOT [id number]) 4mL for a total of 5mL   Procedure:  Risks, benefits and alternatives were discussed, all questions were answered, and the patient signed informed consent. Risks include but are not limited to bleeding, infection, worsening pain, nerve damage, scar formation.  Findings:  An ultrasound evaluation was performed of the RIGHT SASDB using a 12mHz linear array transducer.  The supraspinatus was visualized with the SASDB just superficial sliding under the acromion.  There was a small amount of fluid and bunching of the bursa seen with dynamic testing.   Ultrasound was medically indicated due to the increased accuracy and outcomes of guided non radiating image guidance. The findings are described above.   The area was then prepped with Chloraprep. A 25G 1.5" needle was then inserted into SASDB.  After negative aspiration, the above injectate was then injected. There was no bleeding or complications.  The patient tolerated the procedure well.  Patient had improved ROM and pain relief after injection without any weakness or numbness.   If there are any complications, the patient was instructed to call us.  The patient is to follow-up with us in four weeks.

## 2024-10-10 ENCOUNTER — APPOINTMENT (OUTPATIENT)
Dept: OTOLARYNGOLOGY | Facility: CLINIC | Age: 54
End: 2024-10-10
Payer: MEDICARE

## 2024-10-10 DIAGNOSIS — H93.8X3 OTHER SPECIFIED DISORDERS OF EAR, BILATERAL: ICD-10-CM

## 2024-10-10 DIAGNOSIS — G80.9 CEREBRAL PALSY, UNSPECIFIED: ICD-10-CM

## 2024-10-10 DIAGNOSIS — H61.23 IMPACTED CERUMEN, BILATERAL: ICD-10-CM

## 2024-10-10 PROCEDURE — 69210 REMOVE IMPACTED EAR WAX UNI: CPT

## 2024-10-28 ENCOUNTER — NON-APPOINTMENT (OUTPATIENT)
Age: 54
End: 2024-10-28

## 2024-10-30 ENCOUNTER — APPOINTMENT (OUTPATIENT)
Dept: OBGYN | Facility: CLINIC | Age: 54
End: 2024-10-30

## 2024-10-31 ENCOUNTER — APPOINTMENT (OUTPATIENT)
Dept: ULTRASOUND IMAGING | Facility: IMAGING CENTER | Age: 54
End: 2024-10-31
Payer: MEDICARE

## 2024-10-31 ENCOUNTER — OUTPATIENT (OUTPATIENT)
Dept: OUTPATIENT SERVICES | Facility: HOSPITAL | Age: 54
LOS: 1 days | End: 2024-10-31
Payer: MEDICARE

## 2024-10-31 DIAGNOSIS — Z98.890 OTHER SPECIFIED POSTPROCEDURAL STATES: Chronic | ICD-10-CM

## 2024-10-31 DIAGNOSIS — N31.9 NEUROMUSCULAR DYSFUNCTION OF BLADDER, UNSPECIFIED: ICD-10-CM

## 2024-10-31 PROCEDURE — 76770 US EXAM ABDO BACK WALL COMP: CPT | Mod: 26

## 2024-11-01 ENCOUNTER — APPOINTMENT (OUTPATIENT)
Dept: PHYSICAL MEDICINE AND REHAB | Facility: CLINIC | Age: 54
End: 2024-11-01
Payer: MEDICARE

## 2024-11-01 DIAGNOSIS — G95.9 DISEASE OF SPINAL CORD, UNSPECIFIED: ICD-10-CM

## 2024-11-01 PROCEDURE — 99213 OFFICE O/P EST LOW 20 MIN: CPT

## 2024-11-06 ENCOUNTER — APPOINTMENT (OUTPATIENT)
Dept: INTERNAL MEDICINE | Facility: CLINIC | Age: 54
End: 2024-11-06
Payer: MEDICARE

## 2024-11-06 ENCOUNTER — OUTPATIENT (OUTPATIENT)
Dept: OUTPATIENT SERVICES | Facility: HOSPITAL | Age: 54
LOS: 1 days | End: 2024-11-06

## 2024-11-06 ENCOUNTER — APPOINTMENT (OUTPATIENT)
Dept: OBGYN | Facility: CLINIC | Age: 54
End: 2024-11-06

## 2024-11-06 DIAGNOSIS — Z78.9 OTHER SPECIFIED HEALTH STATUS: ICD-10-CM

## 2024-11-06 DIAGNOSIS — G80.9 CEREBRAL PALSY, UNSPECIFIED: ICD-10-CM

## 2024-11-06 DIAGNOSIS — Z98.890 OTHER SPECIFIED POSTPROCEDURAL STATES: Chronic | ICD-10-CM

## 2024-11-06 DIAGNOSIS — I10 ESSENTIAL (PRIMARY) HYPERTENSION: ICD-10-CM

## 2024-11-06 DIAGNOSIS — N31.9 NEUROMUSCULAR DYSFUNCTION OF BLADDER, UNSPECIFIED: ICD-10-CM

## 2024-11-06 PROCEDURE — 99443: CPT | Mod: 93

## 2024-11-06 PROCEDURE — G0463: CPT

## 2024-11-07 ENCOUNTER — APPOINTMENT (OUTPATIENT)
Dept: PHYSICAL MEDICINE AND REHAB | Facility: CLINIC | Age: 54
End: 2024-11-07

## 2024-11-15 ENCOUNTER — RESULT REVIEW (OUTPATIENT)
Age: 54
End: 2024-11-15

## 2024-11-15 ENCOUNTER — OUTPATIENT (OUTPATIENT)
Dept: OUTPATIENT SERVICES | Facility: HOSPITAL | Age: 54
LOS: 1 days | End: 2024-11-15
Payer: MEDICARE

## 2024-11-15 ENCOUNTER — APPOINTMENT (OUTPATIENT)
Dept: ULTRASOUND IMAGING | Facility: IMAGING CENTER | Age: 54
End: 2024-11-15
Payer: MEDICARE

## 2024-11-15 DIAGNOSIS — Z98.890 OTHER SPECIFIED POSTPROCEDURAL STATES: Chronic | ICD-10-CM

## 2024-11-15 DIAGNOSIS — N63.0 UNSPECIFIED LUMP IN UNSPECIFIED BREAST: ICD-10-CM

## 2024-11-15 PROCEDURE — 76641 ULTRASOUND BREAST COMPLETE: CPT | Mod: 26,50,GA

## 2024-11-15 PROCEDURE — 76641 ULTRASOUND BREAST COMPLETE: CPT

## 2024-11-20 ENCOUNTER — NON-APPOINTMENT (OUTPATIENT)
Age: 54
End: 2024-11-20

## 2024-11-20 ENCOUNTER — APPOINTMENT (OUTPATIENT)
Dept: INFECTIOUS DISEASE | Facility: CLINIC | Age: 54
End: 2024-11-20
Payer: MEDICARE

## 2024-11-20 DIAGNOSIS — N39.0 URINARY TRACT INFECTION, SITE NOT SPECIFIED: ICD-10-CM

## 2024-11-20 PROCEDURE — 99441: CPT | Mod: 93

## 2024-11-20 PROCEDURE — 76770 US EXAM ABDO BACK WALL COMP: CPT

## 2024-11-22 ENCOUNTER — APPOINTMENT (OUTPATIENT)
Dept: OBGYN | Facility: CLINIC | Age: 54
End: 2024-11-22

## 2024-11-22 ENCOUNTER — TRANSCRIPTION ENCOUNTER (OUTPATIENT)
Age: 54
End: 2024-11-22

## 2024-11-25 ENCOUNTER — TRANSCRIPTION ENCOUNTER (OUTPATIENT)
Age: 54
End: 2024-11-25

## 2024-11-27 ENCOUNTER — TRANSCRIPTION ENCOUNTER (OUTPATIENT)
Age: 54
End: 2024-11-27

## 2024-11-29 ENCOUNTER — TRANSCRIPTION ENCOUNTER (OUTPATIENT)
Age: 54
End: 2024-11-29

## 2024-12-02 ENCOUNTER — TRANSCRIPTION ENCOUNTER (OUTPATIENT)
Age: 54
End: 2024-12-02

## 2024-12-04 ENCOUNTER — APPOINTMENT (OUTPATIENT)
Dept: NEUROSURGERY | Facility: CLINIC | Age: 54
End: 2024-12-04
Payer: MEDICARE

## 2024-12-04 ENCOUNTER — APPOINTMENT (OUTPATIENT)
Dept: PHYSICAL MEDICINE AND REHAB | Facility: CLINIC | Age: 54
End: 2024-12-04

## 2024-12-04 ENCOUNTER — APPOINTMENT (OUTPATIENT)
Dept: NEUROSURGERY | Facility: CLINIC | Age: 54
End: 2024-12-04

## 2024-12-04 DIAGNOSIS — G95.9 DISEASE OF SPINAL CORD, UNSPECIFIED: ICD-10-CM

## 2024-12-04 PROCEDURE — 99212 OFFICE O/P EST SF 10 MIN: CPT

## 2024-12-05 ENCOUNTER — NON-APPOINTMENT (OUTPATIENT)
Age: 54
End: 2024-12-05

## 2024-12-05 ENCOUNTER — APPOINTMENT (OUTPATIENT)
Dept: INTERNAL MEDICINE | Facility: CLINIC | Age: 54
End: 2024-12-05
Payer: MEDICARE

## 2024-12-05 VITALS
WEIGHT: 130 LBS | DIASTOLIC BLOOD PRESSURE: 54 MMHG | SYSTOLIC BLOOD PRESSURE: 99 MMHG | OXYGEN SATURATION: 98 % | HEART RATE: 66 BPM | HEIGHT: 59 IN | BODY MASS INDEX: 26.21 KG/M2

## 2024-12-05 DIAGNOSIS — G95.89 OTHER SPECIFIED DISEASES OF SPINAL CORD: ICD-10-CM

## 2024-12-05 DIAGNOSIS — Z12.11 ENCOUNTER FOR SCREENING FOR MALIGNANT NEOPLASM OF COLON: ICD-10-CM

## 2024-12-05 DIAGNOSIS — Z00.00 ENCOUNTER FOR GENERAL ADULT MEDICAL EXAMINATION W/OUT ABNORMAL FINDINGS: ICD-10-CM

## 2024-12-05 PROCEDURE — 99204 OFFICE O/P NEW MOD 45 MIN: CPT | Mod: 25

## 2024-12-05 PROCEDURE — G0439: CPT

## 2024-12-06 ENCOUNTER — APPOINTMENT (OUTPATIENT)
Dept: ENDOCRINOLOGY | Facility: CLINIC | Age: 54
End: 2024-12-06
Payer: MEDICARE

## 2024-12-06 VITALS — WEIGHT: 130 LBS | BODY MASS INDEX: 26.21 KG/M2 | HEIGHT: 59 IN

## 2024-12-06 PROCEDURE — 77080 DXA BONE DENSITY AXIAL: CPT | Mod: GA

## 2024-12-11 ENCOUNTER — APPOINTMENT (OUTPATIENT)
Dept: ENDOCRINOLOGY | Facility: CLINIC | Age: 54
End: 2024-12-11
Payer: MEDICARE

## 2024-12-11 ENCOUNTER — TRANSCRIPTION ENCOUNTER (OUTPATIENT)
Age: 54
End: 2024-12-11

## 2024-12-11 DIAGNOSIS — M85.80 OTHER SPECIFIED DISORDERS OF BONE DENSITY AND STRUCTURE, UNSPECIFIED SITE: ICD-10-CM

## 2024-12-11 DIAGNOSIS — E03.9 HYPOTHYROIDISM, UNSPECIFIED: ICD-10-CM

## 2024-12-11 DIAGNOSIS — E55.9 VITAMIN D DEFICIENCY, UNSPECIFIED: ICD-10-CM

## 2024-12-11 DIAGNOSIS — Z82.49 FAMILY HISTORY OF ISCHEMIC HEART DISEASE AND OTHER DISEASES OF THE CIRCULATORY SYSTEM: ICD-10-CM

## 2024-12-11 DIAGNOSIS — E78.5 HYPERLIPIDEMIA, UNSPECIFIED: ICD-10-CM

## 2024-12-11 DIAGNOSIS — E22.1 HYPERPROLACTINEMIA: ICD-10-CM

## 2024-12-11 LAB
25(OH)D3 SERPL-MCNC: 48.8 NG/ML
ALBUMIN SERPL ELPH-MCNC: 4.4 G/DL
ALP BLD-CCNC: 94 U/L
ALT SERPL-CCNC: 15 U/L
ANION GAP SERPL CALC-SCNC: 12 MMOL/L
AST SERPL-CCNC: 16 U/L
BASOPHILS # BLD AUTO: 0.05 K/UL
BASOPHILS NFR BLD AUTO: 0.8 %
BILIRUB SERPL-MCNC: 0.2 MG/DL
BUN SERPL-MCNC: 8 MG/DL
CALCIUM SERPL-MCNC: 9.6 MG/DL
CHLORIDE SERPL-SCNC: 101 MMOL/L
CHOLEST SERPL-MCNC: 258 MG/DL
CO2 SERPL-SCNC: 23 MMOL/L
CREAT SERPL-MCNC: 0.32 MG/DL
EGFR: 124 ML/MIN/1.73M2
EOSINOPHIL # BLD AUTO: 0.13 K/UL
EOSINOPHIL NFR BLD AUTO: 2.2 %
ESTIMATED AVERAGE GLUCOSE: 105 MG/DL
GLUCOSE SERPL-MCNC: 93 MG/DL
HBA1C MFR BLD HPLC: 5.3 %
HCT VFR BLD CALC: 39.5 %
HDLC SERPL-MCNC: 84 MG/DL
HGB BLD-MCNC: 12.9 G/DL
IMM GRANULOCYTES NFR BLD AUTO: 0.2 %
LDLC SERPL CALC-MCNC: 168 MG/DL
LYMPHOCYTES # BLD AUTO: 2.36 K/UL
LYMPHOCYTES NFR BLD AUTO: 39.1 %
MAN DIFF?: NORMAL
MCHC RBC-ENTMCNC: 29.3 PG
MCHC RBC-ENTMCNC: 32.7 G/DL
MCV RBC AUTO: 89.8 FL
MONOCYTES # BLD AUTO: 0.32 K/UL
MONOCYTES NFR BLD AUTO: 5.3 %
NEUTROPHILS # BLD AUTO: 3.16 K/UL
NEUTROPHILS NFR BLD AUTO: 52.4 %
NONHDLC SERPL-MCNC: 174 MG/DL
PLATELET # BLD AUTO: 260 K/UL
POTASSIUM SERPL-SCNC: 4 MMOL/L
PROT SERPL-MCNC: 7.3 G/DL
RBC # BLD: 4.4 M/UL
RBC # FLD: 12.4 %
SODIUM SERPL-SCNC: 136 MMOL/L
T4 FREE SERPL-MCNC: 1.1 NG/DL
TRIGL SERPL-MCNC: 43 MG/DL
TSH SERPL-ACNC: 3.14 UIU/ML
WBC # FLD AUTO: 6.03 K/UL

## 2024-12-11 PROCEDURE — 99443: CPT | Mod: 93

## 2024-12-12 ENCOUNTER — APPOINTMENT (OUTPATIENT)
Dept: PEDIATRIC ALLERGY IMMUNOLOGY | Facility: CLINIC | Age: 54
End: 2024-12-12

## 2024-12-20 ENCOUNTER — APPOINTMENT (OUTPATIENT)
Dept: ULTRASOUND IMAGING | Facility: IMAGING CENTER | Age: 54
End: 2024-12-20
Payer: MEDICARE

## 2024-12-20 ENCOUNTER — OUTPATIENT (OUTPATIENT)
Dept: OUTPATIENT SERVICES | Facility: HOSPITAL | Age: 54
LOS: 1 days | End: 2024-12-20
Payer: MEDICARE

## 2024-12-20 DIAGNOSIS — D21.9 BENIGN NEOPLASM OF CONNECTIVE AND OTHER SOFT TISSUE, UNSPECIFIED: ICD-10-CM

## 2024-12-20 DIAGNOSIS — Z98.890 OTHER SPECIFIED POSTPROCEDURAL STATES: Chronic | ICD-10-CM

## 2024-12-20 PROCEDURE — 76856 US EXAM PELVIC COMPLETE: CPT | Mod: 26

## 2024-12-20 PROCEDURE — 76856 US EXAM PELVIC COMPLETE: CPT

## 2024-12-27 ENCOUNTER — APPOINTMENT (OUTPATIENT)
Dept: INFECTIOUS DISEASE | Facility: CLINIC | Age: 54
End: 2024-12-27
Payer: MEDICARE

## 2024-12-27 DIAGNOSIS — Z23 ENCOUNTER FOR IMMUNIZATION: ICD-10-CM

## 2024-12-27 PROCEDURE — G0008: CPT

## 2024-12-27 PROCEDURE — 90656 IIV3 VACC NO PRSV 0.5 ML IM: CPT

## 2025-01-03 ENCOUNTER — APPOINTMENT (OUTPATIENT)
Dept: ENDOCRINOLOGY | Facility: CLINIC | Age: 55
End: 2025-01-03

## 2025-01-09 ENCOUNTER — APPOINTMENT (OUTPATIENT)
Dept: PEDIATRIC MEDICAL GENETICS | Facility: TELEHEALTH | Age: 55
End: 2025-01-09

## 2025-01-09 ENCOUNTER — APPOINTMENT (OUTPATIENT)
Dept: PEDIATRIC MEDICAL GENETICS | Facility: TELEHEALTH | Age: 55
End: 2025-01-09
Payer: MEDICARE

## 2025-01-09 ENCOUNTER — APPOINTMENT (OUTPATIENT)
Age: 55
End: 2025-01-09

## 2025-01-09 DIAGNOSIS — M47.22 OTHER SPONDYLOSIS WITH MYELOPATHY, CERVICAL REGION: ICD-10-CM

## 2025-01-09 DIAGNOSIS — M62.81 MUSCLE WEAKNESS (GENERALIZED): ICD-10-CM

## 2025-01-09 DIAGNOSIS — M47.12 OTHER SPONDYLOSIS WITH MYELOPATHY, CERVICAL REGION: ICD-10-CM

## 2025-01-09 DIAGNOSIS — G80.9 CEREBRAL PALSY, UNSPECIFIED: ICD-10-CM

## 2025-01-09 DIAGNOSIS — R26.9 MUSCLE WEAKNESS (GENERALIZED): ICD-10-CM

## 2025-01-09 DIAGNOSIS — G95.9 DISEASE OF SPINAL CORD, UNSPECIFIED: ICD-10-CM

## 2025-01-09 PROCEDURE — 99205 OFFICE O/P NEW HI 60 MIN: CPT

## 2025-01-09 PROCEDURE — ZZZZZ: CPT

## 2025-01-14 ENCOUNTER — TRANSCRIPTION ENCOUNTER (OUTPATIENT)
Age: 55
End: 2025-01-14

## 2025-01-17 ENCOUNTER — APPOINTMENT (OUTPATIENT)
Dept: ULTRASOUND IMAGING | Facility: IMAGING CENTER | Age: 55
End: 2025-01-17
Payer: MEDICARE

## 2025-01-17 ENCOUNTER — OUTPATIENT (OUTPATIENT)
Dept: OUTPATIENT SERVICES | Facility: HOSPITAL | Age: 55
LOS: 1 days | End: 2025-01-17
Payer: MEDICARE

## 2025-01-17 ENCOUNTER — RESULT REVIEW (OUTPATIENT)
Age: 55
End: 2025-01-17

## 2025-01-17 DIAGNOSIS — Z98.890 OTHER SPECIFIED POSTPROCEDURAL STATES: Chronic | ICD-10-CM

## 2025-01-17 DIAGNOSIS — N63.0 UNSPECIFIED LUMP IN UNSPECIFIED BREAST: ICD-10-CM

## 2025-01-17 PROCEDURE — 76642 ULTRASOUND BREAST LIMITED: CPT | Mod: 26,LT

## 2025-01-17 PROCEDURE — 76641 ULTRASOUND BREAST COMPLETE: CPT

## 2025-01-17 PROCEDURE — 76642 ULTRASOUND BREAST LIMITED: CPT

## 2025-01-28 ENCOUNTER — TRANSCRIPTION ENCOUNTER (OUTPATIENT)
Age: 55
End: 2025-01-28

## 2025-02-04 ENCOUNTER — TRANSCRIPTION ENCOUNTER (OUTPATIENT)
Age: 55
End: 2025-02-04

## 2025-02-06 ENCOUNTER — RESULT REVIEW (OUTPATIENT)
Age: 55
End: 2025-02-06

## 2025-02-06 ENCOUNTER — APPOINTMENT (OUTPATIENT)
Dept: ULTRASOUND IMAGING | Facility: IMAGING CENTER | Age: 55
End: 2025-02-06
Payer: MEDICARE

## 2025-02-06 ENCOUNTER — OUTPATIENT (OUTPATIENT)
Dept: OUTPATIENT SERVICES | Facility: HOSPITAL | Age: 55
LOS: 1 days | End: 2025-02-06
Payer: MEDICARE

## 2025-02-06 ENCOUNTER — TRANSCRIPTION ENCOUNTER (OUTPATIENT)
Age: 55
End: 2025-02-06

## 2025-02-06 DIAGNOSIS — N63.0 UNSPECIFIED LUMP IN UNSPECIFIED BREAST: ICD-10-CM

## 2025-02-06 PROCEDURE — 88305 TISSUE EXAM BY PATHOLOGIST: CPT | Mod: 26

## 2025-02-06 PROCEDURE — 19083 BX BREAST 1ST LESION US IMAG: CPT

## 2025-02-06 PROCEDURE — 88305 TISSUE EXAM BY PATHOLOGIST: CPT

## 2025-02-06 PROCEDURE — A4648: CPT

## 2025-02-06 PROCEDURE — 19083 BX BREAST 1ST LESION US IMAG: CPT | Mod: LT

## 2025-02-06 PROCEDURE — 88360 TUMOR IMMUNOHISTOCHEM/MANUAL: CPT

## 2025-02-06 PROCEDURE — 88360 TUMOR IMMUNOHISTOCHEM/MANUAL: CPT | Mod: 26

## 2025-02-12 ENCOUNTER — NON-APPOINTMENT (OUTPATIENT)
Age: 55
End: 2025-02-12

## 2025-02-19 DIAGNOSIS — N63.0 UNSPECIFIED LUMP IN UNSPECIFIED BREAST: ICD-10-CM

## 2025-02-20 ENCOUNTER — APPOINTMENT (OUTPATIENT)
Dept: PEDIATRIC MEDICAL GENETICS | Facility: TELEHEALTH | Age: 55
End: 2025-02-20

## 2025-02-20 DIAGNOSIS — G95.9 DISEASE OF SPINAL CORD, UNSPECIFIED: ICD-10-CM

## 2025-02-20 DIAGNOSIS — M47.22 OTHER SPONDYLOSIS WITH MYELOPATHY, CERVICAL REGION: ICD-10-CM

## 2025-02-20 DIAGNOSIS — R26.9 MUSCLE WEAKNESS (GENERALIZED): ICD-10-CM

## 2025-02-20 DIAGNOSIS — M47.12 OTHER SPONDYLOSIS WITH MYELOPATHY, CERVICAL REGION: ICD-10-CM

## 2025-02-20 DIAGNOSIS — Z15.89 GENETIC SUSCEPTIBILITY TO OTHER DISEASE: ICD-10-CM

## 2025-02-20 DIAGNOSIS — M62.81 MUSCLE WEAKNESS (GENERALIZED): ICD-10-CM

## 2025-02-20 DIAGNOSIS — G80.9 CEREBRAL PALSY, UNSPECIFIED: ICD-10-CM

## 2025-02-20 PROCEDURE — 99215 OFFICE O/P EST HI 40 MIN: CPT | Mod: 2W

## 2025-02-23 PROBLEM — Z15.89: Status: ACTIVE | Noted: 2025-02-23

## 2025-02-27 ENCOUNTER — APPOINTMENT (OUTPATIENT)
Dept: PEDIATRIC ALLERGY IMMUNOLOGY | Facility: CLINIC | Age: 55
End: 2025-02-27

## 2025-04-11 ENCOUNTER — APPOINTMENT (OUTPATIENT)
Dept: PHYSICAL MEDICINE AND REHAB | Facility: CLINIC | Age: 55
End: 2025-04-11

## 2025-04-28 ENCOUNTER — APPOINTMENT (OUTPATIENT)
Dept: INTERNAL MEDICINE | Facility: CLINIC | Age: 55
End: 2025-04-28
Payer: MEDICARE

## 2025-04-28 DIAGNOSIS — G95.89 OTHER SPECIFIED DISEASES OF SPINAL CORD: ICD-10-CM

## 2025-04-28 DIAGNOSIS — N31.9 NEUROMUSCULAR DYSFUNCTION OF BLADDER, UNSPECIFIED: ICD-10-CM

## 2025-04-28 DIAGNOSIS — G80.9 CEREBRAL PALSY, UNSPECIFIED: ICD-10-CM

## 2025-04-28 PROCEDURE — 99213 OFFICE O/P EST LOW 20 MIN: CPT | Mod: 2W

## 2025-04-28 PROCEDURE — G2211 COMPLEX E/M VISIT ADD ON: CPT | Mod: 2W

## 2025-05-02 ENCOUNTER — APPOINTMENT (OUTPATIENT)
Dept: PEDIATRIC ALLERGY IMMUNOLOGY | Facility: CLINIC | Age: 55
End: 2025-05-02

## 2025-06-06 ENCOUNTER — APPOINTMENT (OUTPATIENT)
Dept: ENDOCRINOLOGY | Facility: CLINIC | Age: 55
End: 2025-06-06
Payer: MEDICARE

## 2025-06-06 VITALS
DIASTOLIC BLOOD PRESSURE: 70 MMHG | HEIGHT: 59 IN | SYSTOLIC BLOOD PRESSURE: 115 MMHG | OXYGEN SATURATION: 98 % | HEART RATE: 88 BPM

## 2025-06-06 PROCEDURE — G2211 COMPLEX E/M VISIT ADD ON: CPT

## 2025-06-06 PROCEDURE — 99214 OFFICE O/P EST MOD 30 MIN: CPT

## 2025-06-09 LAB
25(OH)D3 SERPL-MCNC: 83.3 NG/ML
ALBUMIN SERPL ELPH-MCNC: 4.4 G/DL
ALP BLD-CCNC: 101 U/L
ALT SERPL-CCNC: 18 U/L
ANION GAP SERPL CALC-SCNC: 14 MMOL/L
AST SERPL-CCNC: 19 U/L
BASOPHILS # BLD AUTO: 0.07 K/UL
BASOPHILS NFR BLD AUTO: 1 %
BILIRUB SERPL-MCNC: 0.2 MG/DL
BUN SERPL-MCNC: 20 MG/DL
CALCIUM SERPL-MCNC: 9.9 MG/DL
CHLORIDE SERPL-SCNC: 100 MMOL/L
CHOLEST SERPL-MCNC: 253 MG/DL
CO2 SERPL-SCNC: 22 MMOL/L
CREAT SERPL-MCNC: 0.36 MG/DL
EGFRCR SERPLBLD CKD-EPI 2021: 120 ML/MIN/1.73M2
EOSINOPHIL # BLD AUTO: 0.17 K/UL
EOSINOPHIL NFR BLD AUTO: 2.3 %
ESTRADIOL SERPL-MCNC: 14 PG/ML
FOLATE SERPL-MCNC: 16.7 NG/ML
FSH SERPL-MCNC: 112 IU/L
GLUCOSE SERPL-MCNC: 91 MG/DL
HCT VFR BLD CALC: 39.2 %
HDLC SERPL-MCNC: 85 MG/DL
HGB BLD-MCNC: 12.7 G/DL
IMM GRANULOCYTES NFR BLD AUTO: 0.1 %
IRON SATN MFR SERPL: 22 %
IRON SERPL-MCNC: 79 UG/DL
LDLC SERPL-MCNC: 159 MG/DL
LH SERPL-ACNC: 48.8 IU/L
LYMPHOCYTES # BLD AUTO: 2.57 K/UL
LYMPHOCYTES NFR BLD AUTO: 35.2 %
MAN DIFF?: NORMAL
MCHC RBC-ENTMCNC: 30.2 PG
MCHC RBC-ENTMCNC: 32.4 G/DL
MCV RBC AUTO: 93.3 FL
MONOCYTES # BLD AUTO: 0.54 K/UL
MONOCYTES NFR BLD AUTO: 7.4 %
NEUTROPHILS # BLD AUTO: 3.94 K/UL
NEUTROPHILS NFR BLD AUTO: 54 %
NONHDLC SERPL-MCNC: 169 MG/DL
PLATELET # BLD AUTO: 280 K/UL
POTASSIUM SERPL-SCNC: 4.6 MMOL/L
PROLACTIN SERPL-MCNC: 80.1 NG/ML
PROT SERPL-MCNC: 7.2 G/DL
RBC # BLD: 4.2 M/UL
RBC # FLD: 13 %
SODIUM SERPL-SCNC: 136 MMOL/L
T4 FREE SERPL-MCNC: 1 NG/DL
TIBC SERPL-MCNC: 357 UG/DL
TRIGL SERPL-MCNC: 62 MG/DL
TSH SERPL-ACNC: 4.08 UIU/ML
UIBC SERPL-MCNC: 278 UG/DL
VIT B12 SERPL-MCNC: >2000 PG/ML
WBC # FLD AUTO: 7.3 K/UL

## 2025-06-18 ENCOUNTER — TRANSCRIPTION ENCOUNTER (OUTPATIENT)
Age: 55
End: 2025-06-18

## 2025-06-20 ENCOUNTER — LABORATORY RESULT (OUTPATIENT)
Age: 55
End: 2025-06-20

## 2025-06-20 ENCOUNTER — NON-APPOINTMENT (OUTPATIENT)
Age: 55
End: 2025-06-20

## 2025-06-26 ENCOUNTER — NON-APPOINTMENT (OUTPATIENT)
Age: 55
End: 2025-06-26

## 2025-06-30 ENCOUNTER — TRANSCRIPTION ENCOUNTER (OUTPATIENT)
Age: 55
End: 2025-06-30

## 2025-06-30 RX ORDER — DOXYCYCLINE 100 MG/1
100 CAPSULE ORAL
Qty: 4 | Refills: 0 | Status: ACTIVE | COMMUNITY
Start: 2025-06-25 | End: 1900-01-01

## 2025-07-01 ENCOUNTER — TRANSCRIPTION ENCOUNTER (OUTPATIENT)
Age: 55
End: 2025-07-01

## 2025-07-03 ENCOUNTER — TRANSCRIPTION ENCOUNTER (OUTPATIENT)
Age: 55
End: 2025-07-03

## 2025-07-07 ENCOUNTER — NON-APPOINTMENT (OUTPATIENT)
Age: 55
End: 2025-07-07

## 2025-07-07 ENCOUNTER — TRANSCRIPTION ENCOUNTER (OUTPATIENT)
Age: 55
End: 2025-07-07

## 2025-07-08 ENCOUNTER — NON-APPOINTMENT (OUTPATIENT)
Age: 55
End: 2025-07-08

## 2025-07-09 ENCOUNTER — TRANSCRIPTION ENCOUNTER (OUTPATIENT)
Age: 55
End: 2025-07-09

## 2025-07-09 ENCOUNTER — APPOINTMENT (OUTPATIENT)
Dept: NEUROLOGY | Facility: CLINIC | Age: 55
End: 2025-07-09

## 2025-07-09 ENCOUNTER — LABORATORY RESULT (OUTPATIENT)
Age: 55
End: 2025-07-09

## 2025-07-11 ENCOUNTER — TRANSCRIPTION ENCOUNTER (OUTPATIENT)
Age: 55
End: 2025-07-11

## 2025-07-11 ENCOUNTER — APPOINTMENT (OUTPATIENT)
Dept: CARDIOLOGY | Facility: CLINIC | Age: 55
End: 2025-07-11

## 2025-07-14 ENCOUNTER — TRANSCRIPTION ENCOUNTER (OUTPATIENT)
Age: 55
End: 2025-07-14

## 2025-07-16 ENCOUNTER — TRANSCRIPTION ENCOUNTER (OUTPATIENT)
Age: 55
End: 2025-07-16

## 2025-07-18 ENCOUNTER — LABORATORY RESULT (OUTPATIENT)
Age: 55
End: 2025-07-18

## 2025-07-21 ENCOUNTER — TRANSCRIPTION ENCOUNTER (OUTPATIENT)
Age: 55
End: 2025-07-21

## 2025-07-21 RX ORDER — CEPHALEXIN 500 MG/1
500 CAPSULE ORAL
Qty: 30 | Refills: 0 | Status: ACTIVE | COMMUNITY
Start: 2025-07-21 | End: 1900-01-01

## 2025-07-22 ENCOUNTER — TRANSCRIPTION ENCOUNTER (OUTPATIENT)
Age: 55
End: 2025-07-22

## 2025-07-30 ENCOUNTER — APPOINTMENT (OUTPATIENT)
Dept: INFECTIOUS DISEASE | Facility: CLINIC | Age: 55
End: 2025-07-30
Payer: MEDICARE

## 2025-07-30 DIAGNOSIS — N39.0 URINARY TRACT INFECTION, SITE NOT SPECIFIED: ICD-10-CM

## 2025-07-30 DIAGNOSIS — R39.9 UNSPECIFIED SYMPTOMS AND SIGNS INVOLVING THE GENITOURINARY SYSTEM: ICD-10-CM

## 2025-07-30 DIAGNOSIS — R30.0 DYSURIA: ICD-10-CM

## 2025-07-30 PROCEDURE — 99213 OFFICE O/P EST LOW 20 MIN: CPT | Mod: 2W

## 2025-07-30 PROCEDURE — G2211 COMPLEX E/M VISIT ADD ON: CPT | Mod: 2W

## 2025-08-01 ENCOUNTER — LABORATORY RESULT (OUTPATIENT)
Age: 55
End: 2025-08-01

## 2025-08-04 ENCOUNTER — RESULT REVIEW (OUTPATIENT)
Age: 55
End: 2025-08-04

## 2025-08-04 ENCOUNTER — OUTPATIENT (OUTPATIENT)
Dept: OUTPATIENT SERVICES | Facility: HOSPITAL | Age: 55
LOS: 1 days | End: 2025-08-04
Payer: MEDICARE

## 2025-08-04 ENCOUNTER — APPOINTMENT (OUTPATIENT)
Dept: ULTRASOUND IMAGING | Facility: IMAGING CENTER | Age: 55
End: 2025-08-04

## 2025-08-04 DIAGNOSIS — N63.0 UNSPECIFIED LUMP IN UNSPECIFIED BREAST: ICD-10-CM

## 2025-08-04 DIAGNOSIS — Z98.890 OTHER SPECIFIED POSTPROCEDURAL STATES: Chronic | ICD-10-CM

## 2025-08-04 PROCEDURE — 76641 ULTRASOUND BREAST COMPLETE: CPT | Mod: 26,LT,GA

## 2025-08-04 PROCEDURE — 76641 ULTRASOUND BREAST COMPLETE: CPT

## 2025-08-06 ENCOUNTER — TRANSCRIPTION ENCOUNTER (OUTPATIENT)
Age: 55
End: 2025-08-06

## 2025-08-07 ENCOUNTER — TRANSCRIPTION ENCOUNTER (OUTPATIENT)
Age: 55
End: 2025-08-07

## 2025-08-11 ENCOUNTER — APPOINTMENT (OUTPATIENT)
Dept: OTOLARYNGOLOGY | Facility: CLINIC | Age: 55
End: 2025-08-11
Payer: MEDICARE

## 2025-08-11 ENCOUNTER — NON-APPOINTMENT (OUTPATIENT)
Age: 55
End: 2025-08-11

## 2025-08-11 DIAGNOSIS — H93.8X3 OTHER SPECIFIED DISORDERS OF EAR, BILATERAL: ICD-10-CM

## 2025-08-11 DIAGNOSIS — H61.23 IMPACTED CERUMEN, BILATERAL: ICD-10-CM

## 2025-08-11 DIAGNOSIS — Z01.10 ENCOUNTER FOR EXAMINATION OF EARS AND HEARING W/OUT ABNORMAL FINDINGS: ICD-10-CM

## 2025-08-11 DIAGNOSIS — G80.9 CEREBRAL PALSY, UNSPECIFIED: ICD-10-CM

## 2025-08-11 PROCEDURE — 69210 REMOVE IMPACTED EAR WAX UNI: CPT | Mod: LT,RT

## 2025-08-29 ENCOUNTER — TRANSCRIPTION ENCOUNTER (OUTPATIENT)
Age: 55
End: 2025-08-29

## 2025-09-05 ENCOUNTER — TRANSCRIPTION ENCOUNTER (OUTPATIENT)
Age: 55
End: 2025-09-05

## 2025-09-05 DIAGNOSIS — N39.0 URINARY TRACT INFECTION, SITE NOT SPECIFIED: ICD-10-CM

## 2025-09-08 ENCOUNTER — LABORATORY RESULT (OUTPATIENT)
Age: 55
End: 2025-09-08

## 2025-09-10 ENCOUNTER — LABORATORY RESULT (OUTPATIENT)
Age: 55
End: 2025-09-10

## 2025-09-10 ENCOUNTER — TRANSCRIPTION ENCOUNTER (OUTPATIENT)
Age: 55
End: 2025-09-10

## 2025-09-12 ENCOUNTER — TRANSCRIPTION ENCOUNTER (OUTPATIENT)
Age: 55
End: 2025-09-12

## 2025-09-12 ENCOUNTER — APPOINTMENT (OUTPATIENT)
Dept: CARDIOLOGY | Facility: CLINIC | Age: 55
End: 2025-09-12
Payer: MEDICARE

## 2025-09-12 VITALS
OXYGEN SATURATION: 97 % | HEART RATE: 79 BPM | SYSTOLIC BLOOD PRESSURE: 110 MMHG | WEIGHT: 130 LBS | DIASTOLIC BLOOD PRESSURE: 70 MMHG | BODY MASS INDEX: 26.26 KG/M2

## 2025-09-12 DIAGNOSIS — R94.31 ABNORMAL ELECTROCARDIOGRAM [ECG] [EKG]: ICD-10-CM

## 2025-09-12 DIAGNOSIS — E66.3 OVERWEIGHT: ICD-10-CM

## 2025-09-12 DIAGNOSIS — E78.5 HYPERLIPIDEMIA, UNSPECIFIED: ICD-10-CM

## 2025-09-12 PROCEDURE — G0537: CPT

## 2025-09-12 PROCEDURE — G2211 COMPLEX E/M VISIT ADD ON: CPT

## 2025-09-12 PROCEDURE — 99214 OFFICE O/P EST MOD 30 MIN: CPT

## 2025-09-15 ENCOUNTER — TRANSCRIPTION ENCOUNTER (OUTPATIENT)
Age: 55
End: 2025-09-15

## 2025-09-15 ENCOUNTER — NON-APPOINTMENT (OUTPATIENT)
Age: 55
End: 2025-09-15

## 2025-09-15 DIAGNOSIS — R39.9 UNSPECIFIED SYMPTOMS AND SIGNS INVOLVING THE GENITOURINARY SYSTEM: ICD-10-CM

## 2025-09-17 ENCOUNTER — LABORATORY RESULT (OUTPATIENT)
Age: 55
End: 2025-09-17

## 2025-09-18 ENCOUNTER — APPOINTMENT (OUTPATIENT)
Dept: NEUROLOGY | Facility: CLINIC | Age: 55
End: 2025-09-18

## 2025-09-19 ENCOUNTER — APPOINTMENT (OUTPATIENT)
Dept: CARDIOLOGY | Facility: CLINIC | Age: 55
End: 2025-09-19

## 2025-09-19 ENCOUNTER — TRANSCRIPTION ENCOUNTER (OUTPATIENT)
Age: 55
End: 2025-09-19